# Patient Record
Sex: FEMALE | Race: WHITE
[De-identification: names, ages, dates, MRNs, and addresses within clinical notes are randomized per-mention and may not be internally consistent; named-entity substitution may affect disease eponyms.]

---

## 2020-05-25 NOTE — NUR
PT UP OUT OF ROOM HOSTILE NO LONGER WANTING TO BE SEEN STATES THAT THE SHE IS 
FINE NOW THAT SHE HAS HAD SOME WATER. ENCOURAGED PT TO RETURN TO ROOM PT WALKIN 
GOUT OF FAST TRACK TO LOBBY . PT AMBULATEING WITH STEADY GAIT REFUSING TO BE 
SEEN REFUSING TO COME BACK TO ROOM REFUSING TO SIGN PAPER WORK . PT WALKED OUT 
OF HOSPITAL

## 2020-06-25 ENCOUNTER — HOSPITAL ENCOUNTER (EMERGENCY)
Dept: HOSPITAL 94 - ER | Age: 35
Discharge: LEFT BEFORE DISCHARGE | End: 2020-06-25
Payer: MEDICAID

## 2020-06-25 VITALS — BODY MASS INDEX: 23.53 KG/M2 | HEIGHT: 62 IN | WEIGHT: 127.87 LBS

## 2020-06-25 VITALS — SYSTOLIC BLOOD PRESSURE: 109 MMHG | DIASTOLIC BLOOD PRESSURE: 64 MMHG

## 2020-06-25 DIAGNOSIS — Z53.21: ICD-10-CM

## 2020-06-25 DIAGNOSIS — R07.9: Primary | ICD-10-CM

## 2020-06-25 PROCEDURE — 93005 ELECTROCARDIOGRAM TRACING: CPT

## 2020-06-25 NOTE — NUR
Pt walked out of the department without saying anything. Noted to be walking 
towards the exit and asked where she was going, if she is leaving. She stated 
"yeah, I am ok" and kept walking towards.

## 2021-09-04 ENCOUNTER — APPOINTMENT (OUTPATIENT)
Dept: RADIOLOGY | Facility: MEDICAL CENTER | Age: 36
DRG: 957 | End: 2021-09-04
Attending: SURGERY
Payer: MEDICAID

## 2021-09-04 ENCOUNTER — ANESTHESIA (OUTPATIENT)
Dept: SURGERY | Facility: MEDICAL CENTER | Age: 36
DRG: 957 | End: 2021-09-04
Payer: MEDICAID

## 2021-09-04 ENCOUNTER — APPOINTMENT (OUTPATIENT)
Dept: RADIOLOGY | Facility: MEDICAL CENTER | Age: 36
DRG: 957 | End: 2021-09-04
Attending: EMERGENCY MEDICINE
Payer: MEDICAID

## 2021-09-04 ENCOUNTER — APPOINTMENT (OUTPATIENT)
Dept: RADIOLOGY | Facility: MEDICAL CENTER | Age: 36
DRG: 957 | End: 2021-09-04
Attending: SPECIALIST
Payer: MEDICAID

## 2021-09-04 ENCOUNTER — ANESTHESIA EVENT (OUTPATIENT)
Dept: SURGERY | Facility: MEDICAL CENTER | Age: 36
DRG: 957 | End: 2021-09-04
Payer: MEDICAID

## 2021-09-04 ENCOUNTER — HOSPITAL ENCOUNTER (INPATIENT)
Facility: MEDICAL CENTER | Age: 36
LOS: 30 days | DRG: 957 | End: 2021-10-04
Attending: EMERGENCY MEDICINE | Admitting: SURGERY
Payer: MEDICAID

## 2021-09-04 DIAGNOSIS — S12.100A CLOSED DISPLACED FRACTURE OF SECOND CERVICAL VERTEBRA, UNSPECIFIED FRACTURE MORPHOLOGY, INITIAL ENCOUNTER (HCC): ICD-10-CM

## 2021-09-04 DIAGNOSIS — F99 PSYCHIATRIC DISORDER: ICD-10-CM

## 2021-09-04 DIAGNOSIS — J90 PLEURAL EFFUSION ON RIGHT: ICD-10-CM

## 2021-09-04 DIAGNOSIS — J95.821 ACUTE RESPIRATORY FAILURE FOLLOWING TRAUMA AND SURGERY (HCC): ICD-10-CM

## 2021-09-04 DIAGNOSIS — S22.41XA CLOSED FRACTURE OF MULTIPLE RIBS OF RIGHT SIDE, INITIAL ENCOUNTER: ICD-10-CM

## 2021-09-04 DIAGNOSIS — R00.0 TACHYCARDIA: ICD-10-CM

## 2021-09-04 PROBLEM — S27.809A DIAPHRAGM, RUPTURE: Status: ACTIVE | Noted: 2021-09-04

## 2021-09-04 PROBLEM — T19.2XXA FOREIGN BODY IN VAGINA: Status: ACTIVE | Noted: 2021-09-04

## 2021-09-04 PROBLEM — E87.20 LACTIC ACID ACIDOSIS: Status: ACTIVE | Noted: 2021-09-04

## 2021-09-04 PROBLEM — E83.51 HYPOCALCEMIA: Status: ACTIVE | Noted: 2021-09-04

## 2021-09-04 PROBLEM — Z53.09 CONTRAINDICATION TO DEEP VEIN THROMBOSIS (DVT) PROPHYLAXIS: Status: ACTIVE | Noted: 2021-09-04

## 2021-09-04 PROBLEM — S36.113A LIVER LACERATION: Status: ACTIVE | Noted: 2021-09-04

## 2021-09-04 PROBLEM — S27.322A CONTUSION OF BOTH LUNGS: Status: ACTIVE | Noted: 2021-09-04

## 2021-09-04 PROBLEM — T68.XXXA HYPOTHERMIA: Status: ACTIVE | Noted: 2021-09-04

## 2021-09-04 PROBLEM — R57.8 HEMORRHAGIC SHOCK (HCC): Status: ACTIVE | Noted: 2021-09-04

## 2021-09-04 PROBLEM — Z11.52 ENCOUNTER FOR SCREENING FOR COVID-19: Status: ACTIVE | Noted: 2021-09-04

## 2021-09-04 PROBLEM — J94.2 HEMOTHORAX: Status: ACTIVE | Noted: 2021-09-04

## 2021-09-04 PROBLEM — Z78.9 ALCOHOL USE: Status: ACTIVE | Noted: 2021-09-04

## 2021-09-04 PROBLEM — T14.90XA TRAUMA: Status: ACTIVE | Noted: 2021-09-04

## 2021-09-04 PROBLEM — R79.89 ELEVATED LFTS: Status: ACTIVE | Noted: 2021-09-04

## 2021-09-04 LAB
ABO + RH BLD: NORMAL
ABO GROUP BLD: NORMAL
ALBUMIN SERPL BCP-MCNC: 1.9 G/DL (ref 3.2–4.9)
ALBUMIN SERPL BCP-MCNC: 2.3 G/DL (ref 3.2–4.9)
ALBUMIN SERPL BCP-MCNC: 2.5 G/DL (ref 3.2–4.9)
ALBUMIN SERPL BCP-MCNC: 3.6 G/DL (ref 3.2–4.9)
ALBUMIN/GLOB SERPL: 1.1 G/DL
ALBUMIN/GLOB SERPL: 1.2 G/DL
ALBUMIN/GLOB SERPL: 1.3 G/DL
ALBUMIN/GLOB SERPL: 1.3 G/DL
ALP SERPL-CCNC: 114 U/L (ref 30–99)
ALP SERPL-CCNC: 67 U/L (ref 30–99)
ALP SERPL-CCNC: 88 U/L (ref 30–99)
ALP SERPL-CCNC: 92 U/L (ref 30–99)
ALT SERPL-CCNC: 225 U/L (ref 2–50)
ALT SERPL-CCNC: 296 U/L (ref 2–50)
ALT SERPL-CCNC: 318 U/L (ref 2–50)
ALT SERPL-CCNC: 98 U/L (ref 2–50)
ANION GAP SERPL CALC-SCNC: 10 MMOL/L (ref 7–16)
ANION GAP SERPL CALC-SCNC: 11 MMOL/L (ref 7–16)
ANION GAP SERPL CALC-SCNC: 21 MMOL/L (ref 7–16)
ANION GAP SERPL CALC-SCNC: 7 MMOL/L (ref 7–16)
APTT PPP: 29.2 SEC (ref 24.7–36)
APTT PPP: 33 SEC (ref 24.7–36)
AST SERPL-CCNC: 200 U/L (ref 12–45)
AST SERPL-CCNC: 510 U/L (ref 12–45)
AST SERPL-CCNC: 587 U/L (ref 12–45)
AST SERPL-CCNC: 712 U/L (ref 12–45)
BARCODED ABORH UBTYP: 5100
BARCODED ABORH UBTYP: 6200
BARCODED ABORH UBTYP: 8400
BARCODED ABORH UBTYP: 9500
BARCODED PRD CODE UBPRD: NORMAL
BARCODED UNIT NUM UBUNT: NORMAL
BASOPHILS # BLD AUTO: 0.1 % (ref 0–1.8)
BASOPHILS # BLD AUTO: 0.1 % (ref 0–1.8)
BASOPHILS # BLD AUTO: 0.5 % (ref 0–1.8)
BASOPHILS # BLD: 0.02 K/UL (ref 0–0.12)
BASOPHILS # BLD: 0.02 K/UL (ref 0–0.12)
BASOPHILS # BLD: 0.07 K/UL (ref 0–0.12)
BILIRUB SERPL-MCNC: 0.5 MG/DL (ref 0.1–1.5)
BILIRUB SERPL-MCNC: 0.5 MG/DL (ref 0.1–1.5)
BILIRUB SERPL-MCNC: 0.6 MG/DL (ref 0.1–1.5)
BILIRUB SERPL-MCNC: <0.2 MG/DL (ref 0.1–1.5)
BLD GP AB SCN SERPL QL: NORMAL
BUN SERPL-MCNC: 12 MG/DL (ref 8–22)
BUN SERPL-MCNC: 13 MG/DL (ref 8–22)
BUN SERPL-MCNC: 15 MG/DL (ref 8–22)
BUN SERPL-MCNC: 16 MG/DL (ref 8–22)
CA-I SERPL-SCNC: 0.9 MMOL/L (ref 1.1–1.3)
CALCIUM SERPL-MCNC: 5.2 MG/DL (ref 8.5–10.5)
CALCIUM SERPL-MCNC: 6.5 MG/DL (ref 8.5–10.5)
CALCIUM SERPL-MCNC: 7.5 MG/DL (ref 8.5–10.5)
CALCIUM SERPL-MCNC: 9 MG/DL (ref 8.5–10.5)
CFT BLD TEG: 7.1 MIN (ref 4.6–9.1)
CFT BLD TEG: 7.1 MIN (ref 4.6–9.1)
CFT P HPASE BLD TEG: 5.8 MIN (ref 4.3–8.3)
CFT P HPASE BLD TEG: 6.2 MIN (ref 4.3–8.3)
CHLORIDE SERPL-SCNC: 108 MMOL/L (ref 96–112)
CHLORIDE SERPL-SCNC: 111 MMOL/L (ref 96–112)
CHLORIDE SERPL-SCNC: 113 MMOL/L (ref 96–112)
CHLORIDE SERPL-SCNC: 113 MMOL/L (ref 96–112)
CLOT ANGLE BLD TEG: 63 DEGREES (ref 63–78)
CLOT ANGLE BLD TEG: 64.8 DEGREES (ref 63–78)
CLOT LYSIS 30M P MA LENFR BLD TEG: 0 % (ref 0–2.6)
CLOT LYSIS 30M P MA LENFR BLD TEG: 0 % (ref 0–2.6)
CO2 SERPL-SCNC: 15 MMOL/L (ref 20–33)
CO2 SERPL-SCNC: 22 MMOL/L (ref 20–33)
CO2 SERPL-SCNC: 26 MMOL/L (ref 20–33)
CO2 SERPL-SCNC: 26 MMOL/L (ref 20–33)
COMPONENT F 8504F: NORMAL
COMPONENT P 8504P: NORMAL
COMPONENT P 8504P: NORMAL
COMPONENT R 8504R: NORMAL
CORTIS SERPL-MCNC: 30.3 UG/DL (ref 0–23)
COVID ORDER STATUS COVID19: NORMAL
CREAT SERPL-MCNC: 0.6 MG/DL (ref 0.5–1.4)
CREAT SERPL-MCNC: 0.64 MG/DL (ref 0.5–1.4)
CREAT SERPL-MCNC: 0.74 MG/DL (ref 0.5–1.4)
CREAT SERPL-MCNC: 0.86 MG/DL (ref 0.5–1.4)
CT.EXTRINSIC BLD ROTEM: 2.3 MIN (ref 0.8–2.1)
CT.EXTRINSIC BLD ROTEM: 2.3 MIN (ref 0.8–2.1)
EOSINOPHIL # BLD AUTO: 0 K/UL (ref 0–0.51)
EOSINOPHIL # BLD AUTO: 0.01 K/UL (ref 0–0.51)
EOSINOPHIL # BLD AUTO: 0.11 K/UL (ref 0–0.51)
EOSINOPHIL NFR BLD: 0 % (ref 0–6.9)
EOSINOPHIL NFR BLD: 0.1 % (ref 0–6.9)
EOSINOPHIL NFR BLD: 0.7 % (ref 0–6.9)
ERYTHROCYTE [DISTWIDTH] IN BLOOD BY AUTOMATED COUNT: 54.7 FL (ref 35.9–50)
ERYTHROCYTE [DISTWIDTH] IN BLOOD BY AUTOMATED COUNT: 55.2 FL (ref 35.9–50)
ERYTHROCYTE [DISTWIDTH] IN BLOOD BY AUTOMATED COUNT: 56.5 FL (ref 35.9–50)
ERYTHROCYTE [DISTWIDTH] IN BLOOD BY AUTOMATED COUNT: 58.8 FL (ref 35.9–50)
ETHANOL BLD-MCNC: 84 MG/DL (ref 0–10)
FIBRINOGEN PPP-MCNC: 131 MG/DL (ref 215–460)
FLUAV RNA SPEC QL NAA+PROBE: NEGATIVE
FLUBV RNA SPEC QL NAA+PROBE: NEGATIVE
GLOBULIN SER CALC-MCNC: 1.6 G/DL (ref 1.9–3.5)
GLOBULIN SER CALC-MCNC: 2 G/DL (ref 1.9–3.5)
GLOBULIN SER CALC-MCNC: 2.1 G/DL (ref 1.9–3.5)
GLOBULIN SER CALC-MCNC: 2.7 G/DL (ref 1.9–3.5)
GLUCOSE BLD-MCNC: 109 MG/DL (ref 65–99)
GLUCOSE BLD-MCNC: 121 MG/DL (ref 65–99)
GLUCOSE BLD-MCNC: 126 MG/DL (ref 65–99)
GLUCOSE BLD-MCNC: 170 MG/DL (ref 65–99)
GLUCOSE BLD-MCNC: 68 MG/DL (ref 65–99)
GLUCOSE SERPL-MCNC: 123 MG/DL (ref 65–99)
GLUCOSE SERPL-MCNC: 124 MG/DL (ref 65–99)
GLUCOSE SERPL-MCNC: 242 MG/DL (ref 65–99)
GLUCOSE SERPL-MCNC: 72 MG/DL (ref 65–99)
HCG SERPL QL: NEGATIVE
HCT VFR BLD AUTO: 38.8 % (ref 37–47)
HCT VFR BLD AUTO: 39.4 % (ref 37–47)
HCT VFR BLD AUTO: 42.5 % (ref 37–47)
HCT VFR BLD AUTO: 44.4 % (ref 37–47)
HGB BLD-MCNC: 12.1 G/DL (ref 12–16)
HGB BLD-MCNC: 13.2 G/DL (ref 12–16)
HGB BLD-MCNC: 14.9 G/DL (ref 12–16)
HGB BLD-MCNC: 15.3 G/DL (ref 12–16)
IMM GRANULOCYTES # BLD AUTO: 0.1 K/UL (ref 0–0.11)
IMM GRANULOCYTES # BLD AUTO: 0.11 K/UL (ref 0–0.11)
IMM GRANULOCYTES # BLD AUTO: 0.13 K/UL (ref 0–0.11)
IMM GRANULOCYTES NFR BLD AUTO: 0.6 % (ref 0–0.9)
IMM GRANULOCYTES NFR BLD AUTO: 0.7 % (ref 0–0.9)
IMM GRANULOCYTES NFR BLD AUTO: 0.9 % (ref 0–0.9)
INR PPP: 1.1 (ref 0.87–1.13)
INR PPP: 1.54 (ref 0.87–1.13)
LACTATE BLD-SCNC: 2.9 MMOL/L (ref 0.5–2)
LACTATE BLD-SCNC: 8.7 MMOL/L (ref 0.5–2)
LYMPHOCYTES # BLD AUTO: 0.67 K/UL (ref 1–4.8)
LYMPHOCYTES # BLD AUTO: 0.95 K/UL (ref 1–4.8)
LYMPHOCYTES # BLD AUTO: 1.14 K/UL (ref 1–4.8)
LYMPHOCYTES NFR BLD: 3.9 % (ref 22–41)
LYMPHOCYTES NFR BLD: 6 % (ref 22–41)
LYMPHOCYTES NFR BLD: 7.6 % (ref 22–41)
MAGNESIUM SERPL-MCNC: 2 MG/DL (ref 1.5–2.5)
MCF BLD TEG: 47.2 MM (ref 52–69)
MCF BLD TEG: 50.9 MM (ref 52–69)
MCF.PLATELET INHIB BLD ROTEM: 15.1 MM (ref 15–32)
MCF.PLATELET INHIB BLD ROTEM: 15.9 MM (ref 15–32)
MCH RBC QN AUTO: 30.8 PG (ref 27–33)
MCH RBC QN AUTO: 30.8 PG (ref 27–33)
MCH RBC QN AUTO: 31.4 PG (ref 27–33)
MCH RBC QN AUTO: 33.9 PG (ref 27–33)
MCHC RBC AUTO-ENTMCNC: 30.7 G/DL (ref 33.6–35)
MCHC RBC AUTO-ENTMCNC: 34 G/DL (ref 33.6–35)
MCHC RBC AUTO-ENTMCNC: 34.5 G/DL (ref 33.6–35)
MCHC RBC AUTO-ENTMCNC: 35.1 G/DL (ref 33.6–35)
MCV RBC AUTO: 110.4 FL (ref 81.4–97.8)
MCV RBC AUTO: 89.5 FL (ref 81.4–97.8)
MCV RBC AUTO: 89.5 FL (ref 81.4–97.8)
MCV RBC AUTO: 90.7 FL (ref 81.4–97.8)
MONOCYTES # BLD AUTO: 1.45 K/UL (ref 0–0.85)
MONOCYTES # BLD AUTO: 1.54 K/UL (ref 0–0.85)
MONOCYTES # BLD AUTO: 1.56 K/UL (ref 0–0.85)
MONOCYTES NFR BLD AUTO: 10.4 % (ref 0–13.4)
MONOCYTES NFR BLD AUTO: 8.4 % (ref 0–13.4)
MONOCYTES NFR BLD AUTO: 9.8 % (ref 0–13.4)
NEUTROPHILS # BLD AUTO: 11.94 K/UL (ref 2–7.15)
NEUTROPHILS # BLD AUTO: 13.17 K/UL (ref 2–7.15)
NEUTROPHILS # BLD AUTO: 14.91 K/UL (ref 2–7.15)
NEUTROPHILS NFR BLD: 79.9 % (ref 44–72)
NEUTROPHILS NFR BLD: 83.4 % (ref 44–72)
NEUTROPHILS NFR BLD: 86.9 % (ref 44–72)
NRBC # BLD AUTO: 0 K/UL
NRBC BLD-RTO: 0 /100 WBC
PA AA BLD-ACNC: 14.8 % (ref 0–11)
PA AA BLD-ACNC: 32.5 % (ref 0–11)
PA ADP BLD-ACNC: 3.1 % (ref 0–17)
PA ADP BLD-ACNC: 68.4 % (ref 0–17)
PHOSPHATE SERPL-MCNC: 3.2 MG/DL (ref 2.5–4.5)
PLATELET # BLD AUTO: 126 K/UL (ref 164–446)
PLATELET # BLD AUTO: 143 K/UL (ref 164–446)
PLATELET # BLD AUTO: 147 K/UL (ref 164–446)
PLATELET # BLD AUTO: 368 K/UL (ref 164–446)
PMV BLD AUTO: 10.1 FL (ref 9–12.9)
PMV BLD AUTO: 10.1 FL (ref 9–12.9)
PMV BLD AUTO: 10.6 FL (ref 9–12.9)
PMV BLD AUTO: 9.9 FL (ref 9–12.9)
POTASSIUM SERPL-SCNC: 3.8 MMOL/L (ref 3.6–5.5)
POTASSIUM SERPL-SCNC: 4.7 MMOL/L (ref 3.6–5.5)
POTASSIUM SERPL-SCNC: 4.8 MMOL/L (ref 3.6–5.5)
POTASSIUM SERPL-SCNC: 4.9 MMOL/L (ref 3.6–5.5)
PRODUCT TYPE UPROD: NORMAL
PROT SERPL-MCNC: 3.5 G/DL (ref 6–8.2)
PROT SERPL-MCNC: 4.4 G/DL (ref 6–8.2)
PROT SERPL-MCNC: 4.5 G/DL (ref 6–8.2)
PROT SERPL-MCNC: 6.3 G/DL (ref 6–8.2)
PROTHROMBIN TIME: 13.9 SEC (ref 12–14.6)
PROTHROMBIN TIME: 18 SEC (ref 12–14.6)
RBC # BLD AUTO: 3.57 M/UL (ref 4.2–5.4)
RBC # BLD AUTO: 4.28 M/UL (ref 4.2–5.4)
RBC # BLD AUTO: 4.75 M/UL (ref 4.2–5.4)
RBC # BLD AUTO: 4.96 M/UL (ref 4.2–5.4)
RH BLD: NORMAL
RSV RNA SPEC QL NAA+PROBE: NEGATIVE
SARS-COV-2 RNA RESP QL NAA+PROBE: NOTDETECTED
SODIUM SERPL-SCNC: 144 MMOL/L (ref 135–145)
SODIUM SERPL-SCNC: 145 MMOL/L (ref 135–145)
SODIUM SERPL-SCNC: 146 MMOL/L (ref 135–145)
SODIUM SERPL-SCNC: 148 MMOL/L (ref 135–145)
SPECIMEN SOURCE: NORMAL
T4 FREE SERPL-MCNC: 1.58 NG/DL (ref 0.93–1.7)
TEG ALGORITHM TGALG: ABNORMAL
TEG ALGORITHM TGALG: ABNORMAL
TROPONIN T SERPL-MCNC: 72 NG/L (ref 6–19)
TSH SERPL DL<=0.005 MIU/L-ACNC: 1.55 UIU/ML (ref 0.38–5.33)
UNIT STATUS USTAT: NORMAL
WBC # BLD AUTO: 15 K/UL (ref 4.8–10.8)
WBC # BLD AUTO: 15.2 K/UL (ref 4.8–10.8)
WBC # BLD AUTO: 15.8 K/UL (ref 4.8–10.8)
WBC # BLD AUTO: 17.2 K/UL (ref 4.8–10.8)

## 2021-09-04 PROCEDURE — P9017 PLASMA 1 DONOR FRZ W/IN 8 HR: HCPCS | Mod: 91

## 2021-09-04 PROCEDURE — 96374 THER/PROPH/DIAG INJ IV PUSH: CPT

## 2021-09-04 PROCEDURE — 0W3G0ZZ CONTROL BLEEDING IN PERITONEAL CAVITY, OPEN APPROACH: ICD-10-PCS | Performed by: SURGERY

## 2021-09-04 PROCEDURE — 82533 TOTAL CORTISOL: CPT

## 2021-09-04 PROCEDURE — P9034 PLATELETS, PHERESIS: HCPCS

## 2021-09-04 PROCEDURE — 72128 CT CHEST SPINE W/O DYE: CPT

## 2021-09-04 PROCEDURE — 31500 INSERT EMERGENCY AIRWAY: CPT

## 2021-09-04 PROCEDURE — 36430 TRANSFUSION BLD/BLD COMPNT: CPT

## 2021-09-04 PROCEDURE — 99292 CRITICAL CARE ADDL 30 MIN: CPT | Mod: 25 | Performed by: SURGERY

## 2021-09-04 PROCEDURE — 94002 VENT MGMT INPAT INIT DAY: CPT

## 2021-09-04 PROCEDURE — G0390 TRAUMA RESPONS W/HOSP CRITI: HCPCS

## 2021-09-04 PROCEDURE — 74018 RADEX ABDOMEN 1 VIEW: CPT

## 2021-09-04 PROCEDURE — 84132 ASSAY OF SERUM POTASSIUM: CPT

## 2021-09-04 PROCEDURE — 303105 HCHG CATHETER EXTRA

## 2021-09-04 PROCEDURE — 72125 CT NECK SPINE W/O DYE: CPT

## 2021-09-04 PROCEDURE — 84703 CHORIONIC GONADOTROPIN ASSAY: CPT

## 2021-09-04 PROCEDURE — 99291 CRITICAL CARE FIRST HOUR: CPT | Mod: 25 | Performed by: SURGERY

## 2021-09-04 PROCEDURE — 32551 INSERTION OF CHEST TUBE: CPT | Mod: 59 | Performed by: SURGERY

## 2021-09-04 PROCEDURE — 0UCG7ZZ EXTIRPATION OF MATTER FROM VAGINA, VIA NATURAL OR ARTIFICIAL OPENING: ICD-10-PCS | Performed by: SURGERY

## 2021-09-04 PROCEDURE — 71260 CT THORAX DX C+: CPT

## 2021-09-04 PROCEDURE — 82803 BLOOD GASES ANY COMBINATION: CPT | Mod: 91

## 2021-09-04 PROCEDURE — 71045 X-RAY EXAM CHEST 1 VIEW: CPT

## 2021-09-04 PROCEDURE — 160042 HCHG SURGERY MINUTES - EA ADDL 1 MIN LEVEL 5: Performed by: SURGERY

## 2021-09-04 PROCEDURE — 160031 HCHG SURGERY MINUTES - 1ST 30 MINS LEVEL 5: Performed by: SURGERY

## 2021-09-04 PROCEDURE — 160009 HCHG ANES TIME/MIN: Performed by: SURGERY

## 2021-09-04 PROCEDURE — 700111 HCHG RX REV CODE 636 W/ 250 OVERRIDE (IP): Performed by: ANESTHESIOLOGY

## 2021-09-04 PROCEDURE — 85576 BLOOD PLATELET AGGREGATION: CPT | Mod: 91

## 2021-09-04 PROCEDURE — 92950 HEART/LUNG RESUSCITATION CPR: CPT

## 2021-09-04 PROCEDURE — 72131 CT LUMBAR SPINE W/O DYE: CPT

## 2021-09-04 PROCEDURE — 700111 HCHG RX REV CODE 636 W/ 250 OVERRIDE (IP): Performed by: SPECIALIST

## 2021-09-04 PROCEDURE — 84295 ASSAY OF SERUM SODIUM: CPT | Mod: 91

## 2021-09-04 PROCEDURE — 70450 CT HEAD/BRAIN W/O DYE: CPT

## 2021-09-04 PROCEDURE — 83735 ASSAY OF MAGNESIUM: CPT

## 2021-09-04 PROCEDURE — 700111 HCHG RX REV CODE 636 W/ 250 OVERRIDE (IP): Performed by: EMERGENCY MEDICINE

## 2021-09-04 PROCEDURE — 86923 COMPATIBILITY TEST ELECTRIC: CPT | Mod: 91

## 2021-09-04 PROCEDURE — 700105 HCHG RX REV CODE 258: Performed by: SURGERY

## 2021-09-04 PROCEDURE — 0241U HCHG SARS-COV-2 COVID-19 NFCT DS RESP RNA 4 TRGT MIC: CPT

## 2021-09-04 PROCEDURE — 76705 ECHO EXAM OF ABDOMEN: CPT

## 2021-09-04 PROCEDURE — 85610 PROTHROMBIN TIME: CPT | Mod: 91

## 2021-09-04 PROCEDURE — 700111 HCHG RX REV CODE 636 W/ 250 OVERRIDE (IP)

## 2021-09-04 PROCEDURE — P9016 RBC LEUKOCYTES REDUCED: HCPCS | Mod: 91

## 2021-09-04 PROCEDURE — 85014 HEMATOCRIT: CPT

## 2021-09-04 PROCEDURE — 70498 CT ANGIOGRAPHY NECK: CPT

## 2021-09-04 PROCEDURE — 700111 HCHG RX REV CODE 636 W/ 250 OVERRIDE (IP): Performed by: SURGERY

## 2021-09-04 PROCEDURE — 85025 COMPLETE CBC W/AUTO DIFF WBC: CPT | Mod: 91

## 2021-09-04 PROCEDURE — 88300 SURGICAL PATH GROSS: CPT

## 2021-09-04 PROCEDURE — 44125 REMOVAL OF SMALL INTESTINE: CPT | Performed by: SURGERY

## 2021-09-04 PROCEDURE — 82330 ASSAY OF CALCIUM: CPT

## 2021-09-04 PROCEDURE — 501452 HCHG STAPLES, GIA MULTIFIRE 60/80: Performed by: SURGERY

## 2021-09-04 PROCEDURE — 700105 HCHG RX REV CODE 258: Performed by: ANESTHESIOLOGY

## 2021-09-04 PROCEDURE — 82077 ASSAY SPEC XCP UR&BREATH IA: CPT

## 2021-09-04 PROCEDURE — 94003 VENT MGMT INPAT SUBQ DAY: CPT

## 2021-09-04 PROCEDURE — 84100 ASSAY OF PHOSPHORUS: CPT

## 2021-09-04 PROCEDURE — 72170 X-RAY EXAM OF PELVIS: CPT

## 2021-09-04 PROCEDURE — 85347 COAGULATION TIME ACTIVATED: CPT

## 2021-09-04 PROCEDURE — 770022 HCHG ROOM/CARE - ICU (200)

## 2021-09-04 PROCEDURE — 96375 TX/PRO/DX INJ NEW DRUG ADDON: CPT

## 2021-09-04 PROCEDURE — 700101 HCHG RX REV CODE 250: Performed by: EMERGENCY MEDICINE

## 2021-09-04 PROCEDURE — 700105 HCHG RX REV CODE 258: Performed by: NURSE PRACTITIONER

## 2021-09-04 PROCEDURE — 83605 ASSAY OF LACTIC ACID: CPT

## 2021-09-04 PROCEDURE — 700105 HCHG RX REV CODE 258: Performed by: SPECIALIST

## 2021-09-04 PROCEDURE — 85730 THROMBOPLASTIN TIME PARTIAL: CPT

## 2021-09-04 PROCEDURE — 51702 INSERT TEMP BLADDER CATH: CPT

## 2021-09-04 PROCEDURE — 39540 REPAIR OF DIAPHRAGM HERNIA: CPT | Performed by: SURGERY

## 2021-09-04 PROCEDURE — 160048 HCHG OR STATISTICAL LEVEL 1-5: Performed by: SURGERY

## 2021-09-04 PROCEDURE — 85384 FIBRINOGEN ACTIVITY: CPT

## 2021-09-04 PROCEDURE — 47361 REPAIR LIVER WOUND: CPT | Performed by: SURGERY

## 2021-09-04 PROCEDURE — 84443 ASSAY THYROID STIM HORMONE: CPT

## 2021-09-04 PROCEDURE — 84484 ASSAY OF TROPONIN QUANT: CPT

## 2021-09-04 PROCEDURE — 99291 CRITICAL CARE FIRST HOUR: CPT

## 2021-09-04 PROCEDURE — 0DQV0ZZ REPAIR MESENTERY, OPEN APPROACH: ICD-10-PCS | Performed by: SURGERY

## 2021-09-04 PROCEDURE — 501433 HCHG STAPLER, GIA MULTIFIRE 60/80: Performed by: SURGERY

## 2021-09-04 PROCEDURE — 700101 HCHG RX REV CODE 250: Performed by: ANESTHESIOLOGY

## 2021-09-04 PROCEDURE — 0DBL0ZZ EXCISION OF TRANSVERSE COLON, OPEN APPROACH: ICD-10-PCS | Performed by: SURGERY

## 2021-09-04 PROCEDURE — 80053 COMPREHEN METABOLIC PANEL: CPT | Mod: 91

## 2021-09-04 PROCEDURE — 86900 BLOOD TYPING SEROLOGIC ABO: CPT

## 2021-09-04 PROCEDURE — 700117 HCHG RX CONTRAST REV CODE 255: Performed by: SURGERY

## 2021-09-04 PROCEDURE — 94799 UNLISTED PULMONARY SVC/PX: CPT

## 2021-09-04 PROCEDURE — 86901 BLOOD TYPING SEROLOGIC RH(D): CPT

## 2021-09-04 PROCEDURE — 84439 ASSAY OF FREE THYROXINE: CPT

## 2021-09-04 PROCEDURE — 32551 INSERTION OF CHEST TUBE: CPT

## 2021-09-04 PROCEDURE — 700105 HCHG RX REV CODE 258: Performed by: EMERGENCY MEDICINE

## 2021-09-04 PROCEDURE — 30233N1 TRANSFUSION OF NONAUTOLOGOUS RED BLOOD CELLS INTO PERIPHERAL VEIN, PERCUTANEOUS APPROACH: ICD-10-PCS | Performed by: SURGERY

## 2021-09-04 PROCEDURE — 502704 HCHG DEVICE, LIGASURE IMPACT: Performed by: SURGERY

## 2021-09-04 PROCEDURE — 82962 GLUCOSE BLOOD TEST: CPT

## 2021-09-04 PROCEDURE — 0W9930Z DRAINAGE OF RIGHT PLEURAL CAVITY WITH DRAINAGE DEVICE, PERCUTANEOUS APPROACH: ICD-10-PCS | Performed by: SURGERY

## 2021-09-04 PROCEDURE — 700101 HCHG RX REV CODE 250: Performed by: SPECIALIST

## 2021-09-04 PROCEDURE — 86850 RBC ANTIBODY SCREEN: CPT

## 2021-09-04 PROCEDURE — 501838 HCHG SUTURE GENERAL: Performed by: SURGERY

## 2021-09-04 PROCEDURE — 5A1955Z RESPIRATORY VENTILATION, GREATER THAN 96 CONSECUTIVE HOURS: ICD-10-PCS | Performed by: SURGERY

## 2021-09-04 PROCEDURE — 88307 TISSUE EXAM BY PATHOLOGIST: CPT

## 2021-09-04 PROCEDURE — 85027 COMPLETE CBC AUTOMATED: CPT

## 2021-09-04 RX ORDER — POLYETHYLENE GLYCOL 3350 17 G/17G
1 POWDER, FOR SOLUTION ORAL 2 TIMES DAILY PRN
Status: DISCONTINUED | OUTPATIENT
Start: 2021-09-04 | End: 2021-09-07

## 2021-09-04 RX ORDER — ENEMA 19; 7 G/133ML; G/133ML
1 ENEMA RECTAL
Status: DISCONTINUED | OUTPATIENT
Start: 2021-09-04 | End: 2021-10-02

## 2021-09-04 RX ORDER — OXYCODONE HYDROCHLORIDE 5 MG/1
5 TABLET ORAL
Status: DISCONTINUED | OUTPATIENT
Start: 2021-09-04 | End: 2021-09-04

## 2021-09-04 RX ORDER — FAMOTIDINE 20 MG/1
20 TABLET, FILM COATED ORAL 2 TIMES DAILY
Status: DISCONTINUED | OUTPATIENT
Start: 2021-09-04 | End: 2021-09-04

## 2021-09-04 RX ORDER — ROCURONIUM BROMIDE 10 MG/ML
INJECTION, SOLUTION INTRAVENOUS
Status: COMPLETED | OUTPATIENT
Start: 2021-09-04 | End: 2021-09-04

## 2021-09-04 RX ORDER — AMOXICILLIN 250 MG
1 CAPSULE ORAL NIGHTLY PRN
Status: DISCONTINUED | OUTPATIENT
Start: 2021-09-04 | End: 2021-09-04

## 2021-09-04 RX ORDER — KETAMINE HYDROCHLORIDE 50 MG/ML
INJECTION, SOLUTION INTRAMUSCULAR; INTRAVENOUS
Status: COMPLETED | OUTPATIENT
Start: 2021-09-04 | End: 2021-09-04

## 2021-09-04 RX ORDER — OXYCODONE HYDROCHLORIDE 10 MG/1
10 TABLET ORAL
Status: DISCONTINUED | OUTPATIENT
Start: 2021-09-04 | End: 2021-09-04

## 2021-09-04 RX ORDER — CEFOTETAN DISODIUM 2 G/20ML
INJECTION, POWDER, FOR SOLUTION INTRAMUSCULAR; INTRAVENOUS PRN
Status: DISCONTINUED | OUTPATIENT
Start: 2021-09-04 | End: 2021-09-04 | Stop reason: SURG

## 2021-09-04 RX ORDER — ONDANSETRON 4 MG/1
4 TABLET, ORALLY DISINTEGRATING ORAL EVERY 4 HOURS PRN
Status: DISCONTINUED | OUTPATIENT
Start: 2021-09-04 | End: 2021-09-07

## 2021-09-04 RX ORDER — NOREPINEPHRINE BITARTRATE 0.03 MG/ML
0-30 INJECTION, SOLUTION INTRAVENOUS CONTINUOUS
Status: DISCONTINUED | OUTPATIENT
Start: 2021-09-04 | End: 2021-09-07

## 2021-09-04 RX ORDER — BISACODYL 10 MG
10 SUPPOSITORY, RECTAL RECTAL
Status: DISCONTINUED | OUTPATIENT
Start: 2021-09-04 | End: 2021-09-29

## 2021-09-04 RX ORDER — MIDAZOLAM HYDROCHLORIDE 1 MG/ML
1-5 INJECTION INTRAMUSCULAR; INTRAVENOUS
Status: DISCONTINUED | OUTPATIENT
Start: 2021-09-04 | End: 2021-09-11

## 2021-09-04 RX ORDER — POLYETHYLENE GLYCOL 3350 17 G/17G
1 POWDER, FOR SOLUTION ORAL 2 TIMES DAILY
Status: DISCONTINUED | OUTPATIENT
Start: 2021-09-04 | End: 2021-09-04

## 2021-09-04 RX ORDER — DEXTROSE MONOHYDRATE 25 G/50ML
50 INJECTION, SOLUTION INTRAVENOUS
Status: DISCONTINUED | OUTPATIENT
Start: 2021-09-04 | End: 2021-09-11

## 2021-09-04 RX ORDER — AMOXICILLIN 250 MG
1 CAPSULE ORAL
Status: DISCONTINUED | OUTPATIENT
Start: 2021-09-04 | End: 2021-09-07

## 2021-09-04 RX ORDER — DOCUSATE SODIUM 100 MG/1
100 CAPSULE, LIQUID FILLED ORAL 2 TIMES DAILY
Status: DISCONTINUED | OUTPATIENT
Start: 2021-09-04 | End: 2021-09-07

## 2021-09-04 RX ORDER — ACETAMINOPHEN 325 MG/1
650 TABLET ORAL EVERY 4 HOURS PRN
Status: DISCONTINUED | OUTPATIENT
Start: 2021-09-04 | End: 2021-09-07

## 2021-09-04 RX ORDER — SODIUM CHLORIDE 9 MG/ML
INJECTION, SOLUTION INTRAVENOUS
Status: COMPLETED | OUTPATIENT
Start: 2021-09-04 | End: 2021-09-04

## 2021-09-04 RX ORDER — ROCURONIUM BROMIDE 10 MG/ML
INJECTION, SOLUTION INTRAVENOUS PRN
Status: DISCONTINUED | OUTPATIENT
Start: 2021-09-04 | End: 2021-09-04 | Stop reason: SURG

## 2021-09-04 RX ORDER — ONDANSETRON 2 MG/ML
4 INJECTION INTRAMUSCULAR; INTRAVENOUS EVERY 4 HOURS PRN
Status: DISCONTINUED | OUTPATIENT
Start: 2021-09-04 | End: 2021-09-25

## 2021-09-04 RX ORDER — CALCIUM GLUCONATE 20 MG/ML
2 INJECTION, SOLUTION INTRAVENOUS ONCE
Status: COMPLETED | OUTPATIENT
Start: 2021-09-04 | End: 2021-09-04

## 2021-09-04 RX ORDER — ACETAMINOPHEN 650 MG/1
650 SUPPOSITORY RECTAL EVERY 4 HOURS PRN
Status: DISCONTINUED | OUTPATIENT
Start: 2021-09-04 | End: 2021-09-07

## 2021-09-04 RX ORDER — HEPARIN SODIUM,PORCINE 1000/ML
VIAL (ML) INJECTION
Status: DISCONTINUED | OUTPATIENT
Start: 2021-09-04 | End: 2021-09-04 | Stop reason: HOSPADM

## 2021-09-04 RX ORDER — SODIUM CHLORIDE 9 MG/ML
500 INJECTION, SOLUTION INTRAVENOUS ONCE
Status: COMPLETED | OUTPATIENT
Start: 2021-09-04 | End: 2021-09-04

## 2021-09-04 RX ORDER — SODIUM CHLORIDE 9 MG/ML
INJECTION, SOLUTION INTRAVENOUS CONTINUOUS
Status: DISCONTINUED | OUTPATIENT
Start: 2021-09-04 | End: 2021-09-10

## 2021-09-04 RX ORDER — SODIUM CHLORIDE, SODIUM LACTATE, POTASSIUM CHLORIDE, AND CALCIUM CHLORIDE .6; .31; .03; .02 G/100ML; G/100ML; G/100ML; G/100ML
1500 INJECTION, SOLUTION INTRAVENOUS ONCE
Status: COMPLETED | OUTPATIENT
Start: 2021-09-04 | End: 2021-09-04

## 2021-09-04 RX ORDER — HYDROMORPHONE HYDROCHLORIDE 1 MG/ML
0.5 INJECTION, SOLUTION INTRAMUSCULAR; INTRAVENOUS; SUBCUTANEOUS
Status: DISCONTINUED | OUTPATIENT
Start: 2021-09-04 | End: 2021-09-04

## 2021-09-04 RX ORDER — HYDROMORPHONE HYDROCHLORIDE 1 MG/ML
0.5 INJECTION, SOLUTION INTRAMUSCULAR; INTRAVENOUS; SUBCUTANEOUS
Status: DISCONTINUED | OUTPATIENT
Start: 2021-09-04 | End: 2021-09-11

## 2021-09-04 RX ORDER — CALCIUM CHLORIDE 100 MG/ML
1 INJECTION INTRAVENOUS; INTRAVENTRICULAR ONCE
Status: DISCONTINUED | OUTPATIENT
Start: 2021-09-04 | End: 2021-09-04

## 2021-09-04 RX ORDER — DEXTROSE MONOHYDRATE 25 G/50ML
INJECTION, SOLUTION INTRAVENOUS
Status: ACTIVE
Start: 2021-09-04 | End: 2021-09-04

## 2021-09-04 RX ORDER — SODIUM CHLORIDE, SODIUM GLUCONATE, SODIUM ACETATE, POTASSIUM CHLORIDE AND MAGNESIUM CHLORIDE 526; 502; 368; 37; 30 MG/100ML; MG/100ML; MG/100ML; MG/100ML; MG/100ML
INJECTION, SOLUTION INTRAVENOUS
Status: DISCONTINUED | OUTPATIENT
Start: 2021-09-04 | End: 2021-09-04 | Stop reason: SURG

## 2021-09-04 RX ORDER — FAMOTIDINE 20 MG/1
20 TABLET, FILM COATED ORAL 2 TIMES DAILY
Status: DISCONTINUED | OUTPATIENT
Start: 2021-09-04 | End: 2021-09-07

## 2021-09-04 RX ORDER — AMOXICILLIN 250 MG
1 CAPSULE ORAL NIGHTLY
Status: DISCONTINUED | OUTPATIENT
Start: 2021-09-04 | End: 2021-09-04

## 2021-09-04 RX ADMIN — KETAMINE HYDROCHLORIDE 50 MG: 50 INJECTION INTRAMUSCULAR; INTRAVENOUS at 02:57

## 2021-09-04 RX ADMIN — CALCIUM GLUCONATE 2 G: 20 INJECTION, SOLUTION INTRAVENOUS at 09:37

## 2021-09-04 RX ADMIN — KETAMINE HYDROCHLORIDE 50 MG: 50 INJECTION INTRAMUSCULAR; INTRAVENOUS at 02:48

## 2021-09-04 RX ADMIN — HYDROMORPHONE HYDROCHLORIDE 0.5 MG: 1 INJECTION, SOLUTION INTRAMUSCULAR; INTRAVENOUS; SUBCUTANEOUS at 22:34

## 2021-09-04 RX ADMIN — MIDAZOLAM HYDROCHLORIDE 2 MG: 1 INJECTION, SOLUTION INTRAMUSCULAR; INTRAVENOUS at 19:07

## 2021-09-04 RX ADMIN — FAMOTIDINE 20 MG: 10 INJECTION INTRAVENOUS at 07:39

## 2021-09-04 RX ADMIN — SODIUM CHLORIDE 500 ML: 9 INJECTION, SOLUTION INTRAVENOUS at 09:37

## 2021-09-04 RX ADMIN — PROPOFOL 60 MCG/KG/MIN: 10 INJECTION, EMULSION INTRAVENOUS at 10:24

## 2021-09-04 RX ADMIN — IOHEXOL 90 ML: 350 INJECTION, SOLUTION INTRAVENOUS at 03:37

## 2021-09-04 RX ADMIN — SODIUM BICARBONATE 50 MEQ: 84 INJECTION, SOLUTION INTRAVENOUS at 06:01

## 2021-09-04 RX ADMIN — SODIUM CHLORIDE, SODIUM GLUCONATE, SODIUM ACETATE, POTASSIUM CHLORIDE AND MAGNESIUM CHLORIDE: 526; 502; 368; 37; 30 INJECTION, SOLUTION INTRAVENOUS at 05:17

## 2021-09-04 RX ADMIN — PROPOFOL 60 MCG/KG/MIN: 10 INJECTION, EMULSION INTRAVENOUS at 15:05

## 2021-09-04 RX ADMIN — ROCURONIUM BROMIDE 25 MG: 10 INJECTION, SOLUTION INTRAVENOUS at 05:16

## 2021-09-04 RX ADMIN — SODIUM CHLORIDE 500 ML: 9 INJECTION, SOLUTION INTRAVENOUS at 04:12

## 2021-09-04 RX ADMIN — HYDROMORPHONE HYDROCHLORIDE 0.5 MG: 1 INJECTION, SOLUTION INTRAMUSCULAR; INTRAVENOUS; SUBCUTANEOUS at 20:32

## 2021-09-04 RX ADMIN — CALCIUM CHLORIDE 1000 MG: 100 INJECTION, SOLUTION INTRAVENOUS at 15:18

## 2021-09-04 RX ADMIN — FENTANYL CITRATE 50 MCG: 50 INJECTION, SOLUTION INTRAMUSCULAR; INTRAVENOUS at 02:47

## 2021-09-04 RX ADMIN — FAMOTIDINE 20 MG: 10 INJECTION INTRAVENOUS at 17:17

## 2021-09-04 RX ADMIN — PIPERACILLIN AND TAZOBACTAM 3.38 G: 3; .375 INJECTION, POWDER, LYOPHILIZED, FOR SOLUTION INTRAVENOUS; PARENTERAL at 20:32

## 2021-09-04 RX ADMIN — SODIUM CHLORIDE 1000 ML: 9 INJECTION, SOLUTION INTRAVENOUS at 02:42

## 2021-09-04 RX ADMIN — SODIUM CHLORIDE, SODIUM GLUCONATE, SODIUM ACETATE, POTASSIUM CHLORIDE AND MAGNESIUM CHLORIDE: 526; 502; 368; 37; 30 INJECTION, SOLUTION INTRAVENOUS at 05:01

## 2021-09-04 RX ADMIN — DEXTROSE MONOHYDRATE 50 ML: 25 INJECTION, SOLUTION INTRAVENOUS at 07:13

## 2021-09-04 RX ADMIN — PROPOFOL 5 MCG/KG/MIN: 10 INJECTION, EMULSION INTRAVENOUS at 03:30

## 2021-09-04 RX ADMIN — SODIUM CHLORIDE, POTASSIUM CHLORIDE, SODIUM LACTATE AND CALCIUM CHLORIDE 1500 ML: 600; 310; 30; 20 INJECTION, SOLUTION INTRAVENOUS at 17:16

## 2021-09-04 RX ADMIN — ROCURONIUM BROMIDE 50 MG: 10 INJECTION, SOLUTION INTRAVENOUS at 04:36

## 2021-09-04 RX ADMIN — PROPOFOL 60 MCG/KG/MIN: 10 INJECTION, EMULSION INTRAVENOUS at 19:20

## 2021-09-04 RX ADMIN — PIPERACILLIN AND TAZOBACTAM 3.38 G: 3; .375 INJECTION, POWDER, LYOPHILIZED, FOR SOLUTION INTRAVENOUS; PARENTERAL at 07:31

## 2021-09-04 RX ADMIN — SODIUM CHLORIDE: 9 INJECTION, SOLUTION INTRAVENOUS at 18:20

## 2021-09-04 RX ADMIN — PROPOFOL 60 MCG/KG/MIN: 10 INJECTION, EMULSION INTRAVENOUS at 22:35

## 2021-09-04 RX ADMIN — SODIUM CHLORIDE: 9 INJECTION, SOLUTION INTRAVENOUS at 03:52

## 2021-09-04 RX ADMIN — SODIUM CHLORIDE: 9 INJECTION, SOLUTION INTRAVENOUS at 07:34

## 2021-09-04 RX ADMIN — HYDROMORPHONE HYDROCHLORIDE 0.5 MG: 1 INJECTION, SOLUTION INTRAMUSCULAR; INTRAVENOUS; SUBCUTANEOUS at 18:21

## 2021-09-04 RX ADMIN — SODIUM CHLORIDE, SODIUM GLUCONATE, SODIUM ACETATE, POTASSIUM CHLORIDE AND MAGNESIUM CHLORIDE: 526; 502; 368; 37; 30 INJECTION, SOLUTION INTRAVENOUS at 05:27

## 2021-09-04 RX ADMIN — HYDROMORPHONE HYDROCHLORIDE 0.5 MG: 1 INJECTION, SOLUTION INTRAMUSCULAR; INTRAVENOUS; SUBCUTANEOUS at 11:12

## 2021-09-04 RX ADMIN — SODIUM BICARBONATE 50 MEQ: 84 INJECTION, SOLUTION INTRAVENOUS at 05:34

## 2021-09-04 RX ADMIN — SODIUM BICARBONATE 50 MEQ: 84 INJECTION, SOLUTION INTRAVENOUS at 06:05

## 2021-09-04 RX ADMIN — CEFOTETAN DISODIUM 2 G: 2 INJECTION, POWDER, FOR SOLUTION INTRAMUSCULAR; INTRAVENOUS at 04:46

## 2021-09-04 RX ADMIN — ROCURONIUM BROMIDE 50 MG: 10 INJECTION, SOLUTION INTRAVENOUS at 02:57

## 2021-09-04 RX ADMIN — HYDROMORPHONE HYDROCHLORIDE 0.5 MG: 1 INJECTION, SOLUTION INTRAMUSCULAR; INTRAVENOUS; SUBCUTANEOUS at 14:25

## 2021-09-04 RX ADMIN — SODIUM CHLORIDE, SODIUM GLUCONATE, SODIUM ACETATE, POTASSIUM CHLORIDE AND MAGNESIUM CHLORIDE: 526; 502; 368; 37; 30 INJECTION, SOLUTION INTRAVENOUS at 04:51

## 2021-09-04 RX ADMIN — SODIUM CHLORIDE, SODIUM GLUCONATE, SODIUM ACETATE, POTASSIUM CHLORIDE AND MAGNESIUM CHLORIDE: 526; 502; 368; 37; 30 INJECTION, SOLUTION INTRAVENOUS at 05:59

## 2021-09-04 RX ADMIN — PROPOFOL 20 MCG/KG/MIN: 10 INJECTION, EMULSION INTRAVENOUS at 03:53

## 2021-09-04 RX ADMIN — PIPERACILLIN AND TAZOBACTAM 3.38 G: 3; .375 INJECTION, POWDER, LYOPHILIZED, FOR SOLUTION INTRAVENOUS; PARENTERAL at 11:13

## 2021-09-04 RX ADMIN — SODIUM CHLORIDE, SODIUM GLUCONATE, SODIUM ACETATE, POTASSIUM CHLORIDE AND MAGNESIUM CHLORIDE: 526; 502; 368; 37; 30 INJECTION, SOLUTION INTRAVENOUS at 05:35

## 2021-09-04 RX ADMIN — HYDROMORPHONE HYDROCHLORIDE 0.5 MG: 1 INJECTION, SOLUTION INTRAMUSCULAR; INTRAVENOUS; SUBCUTANEOUS at 07:31

## 2021-09-04 RX ADMIN — SODIUM BICARBONATE 50 MEQ: 84 INJECTION, SOLUTION INTRAVENOUS at 05:29

## 2021-09-04 ASSESSMENT — PAIN DESCRIPTION - PAIN TYPE
TYPE: ACUTE PAIN

## 2021-09-04 ASSESSMENT — FIBROSIS 4 INDEX: FIB4 SCORE: 9.57

## 2021-09-04 ASSESSMENT — PAIN SCALES - GENERAL: PAIN_LEVEL: 0

## 2021-09-04 NOTE — RESPIRATORY CARE
Respiratory Trauma Red Note        0247: ETCO2 monitoring for conscious sedation. 25    0257: Pt intubated by ERP, % O2, 7.5 ETT cuff checked and stylet in place, positive color change (yellow), bilateral breath sounds, and fogging of ETT.    0300: Placed on initial vent settings.

## 2021-09-04 NOTE — ANESTHESIA TIME REPORT
Anesthesia Start and Stop Event Times     Date Time Event    9/4/2021 0434 Ready for Procedure     0435 Anesthesia Start     0708 Anesthesia Stop        Responsible Staff  09/04/21    Name Role Begin End    Gold Howe M.D. Anesth 0435 0708        Preop Diagnosis (Free Text):  Pre-op Diagnosis     Intraabdominal bleeding, ruptured diaphragm, mesenteric tear, liver hemorrhage        Preop Diagnosis (Codes):    Post op Diagnosis  Injury to intraabdominal organ      Premium Reason  A. 3PM - 7AM    Comments:
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
## 2021-09-04 NOTE — PROCEDURES
Procedure Report    Date of Procedure: 9/4/2021    Procedure: Right tube thoracostomy    Surgeon: Jozef Aguayo MD    Diagnosis: Trauma, right fractures    Indications: hemo-Pneumothorax    Procedure in detail: The patient's rightt lateral chest wall was prepped and draped in the standard sterile fashion. Lidocaine was injected in the skin and subcutaneous tissues in the 4th or 5th intercostal space down to the pleura. A 2cm skin incision was made at the level of the nipple in the anterior axillary line. Blunt dissection was used to enter the thoracic cavity. A 36Fr chest tube was inserted and secured to the skin  with suture.  Sterile dressings were applied. The chest tube was attached to -20cm water suction. The patient tolerated the procedure well.  A chest x-ray was ordered for verification.       Jozef Aguayo MD, FACS  [facility]  [phone]

## 2021-09-04 NOTE — ASSESSMENT & PLAN NOTE
Bilateral pulmonary contusions, right greater than left.  Aggressive pulmonary hygiene and serial chest radiography.

## 2021-09-04 NOTE — ANESTHESIA PREPROCEDURE EVALUATION
Relevant Problems   No relevant active problems       Physical Exam    Airway   Mallampati: II  TM distance: >3 FB  Neck ROM: full       Cardiovascular - normal exam  Rhythm: regular  Rate: normal  (-) murmur     Dental - normal exam           Pulmonary - normal exam  Breath sounds clear to auscultation     Abdominal    Neurological - normal exam                 Anesthesia Plan    ASA 3- EMERGENT   ASA physical status emergent criteria: acute hemorrhage    Plan - general       Airway plan will be ETT          Induction: intravenous    Postoperative Plan: Postoperative administration of opioids is intended.    Pertinent diagnostic labs and testing reviewed    Informed Consent:    Anesthetic plan and risks discussed with patient.    Use of blood products discussed with: patient whom consented to blood products.

## 2021-09-04 NOTE — ASSESSMENT & PLAN NOTE
9/4 early am: 2 units prbc given in trauma bay, then 10 units prbc, 4 FFP, one unit of platelets in OR. 6 L crystalloid in OR. 430 cc from cell saver.  9/5 return to OR for 2nd look laparotomy -additional approximately 1 L blood loss.  Given 4 units PRBC and 2 units FFP empirically and 1 U platelets based on TEG  Trend labs and hemodynamics.

## 2021-09-04 NOTE — ASSESSMENT & PLAN NOTE
Ruptured diaphragm, herniation liver into the chest, liver lacerations, laceration of the mesentery with actively bleeding vessel.  9/4 Exploratory laparotomy on admission with two segmental small bowel resections, right diaphragm repair, control of hepatic and mesenteric hemorrhage, damage control abdominal closure.  9/5 Planned second look laparotomy with removal of laparotomy pads, serosal repair of colon, hepatorrhaphy, and delayed primary fascial abdominal closure.  Completed a 4-day course of Zosyn.  9/16 Perihepatic and subcapsular fluid increased with small foci of air, fluid extends inferiorly to anterior right pelvis, increase in perisplenic fluid.  9/17 IR drainage of anterior abdominal fluid collection, fluid culture negative.  9/21 Zosyn completed.  Jozef Aguayo MD. Trauma Surgery.

## 2021-09-04 NOTE — DISCHARGE PLANNING
LSW contacted [employer] dispatch again regarding case to see if they have any new information on patient yet still no changes to case.     Plan: Continue to follow up with [employer] regarding case and locate [facility]

## 2021-09-04 NOTE — OP REPORT
DATE OF OPERATION: 9/4/2021      PREOPERATIVE DIAGNOSIS: Intra-abdominal bleeding  Vaginal foreign body     POSTOPERATIVE DIAGNOSIS: Ruptured diaphragm, herniation liver into the chest, liver lacerations  Laceration of the mesentery with actively bleeding vessel    Temporary abdominal closure abdomen packed with approximately 8 laparotomy pads for liver hemostasis    PROCEDURE PERFORMED:  Small bowel resection x2  Control bleeding loss of the mesentery  Repair diaphragm  Controlled liver hemorrhage with electrocautery and packing  Packing with approximately  8 laparotomy pads    Temporary abdominal closure     Removal of vaginal foreign bodies    SURGEON: SHERRI Cedeno P.A.-C    ANESTHESIOLOGIST: Gold Howe    ANESTHESIA: General endotracheal anesthesia.    ASA CLASSIFICATION: V.    INDICATIONS: The patient is a 35 y.o.-year-old female with clinical and radiographic findings of intra-abdominal bleeding. She  is taken to the operating room for laparotomy  Vaginal foreign body present on CT scan    FINDINGS: Rupture of the diaphragm herniation liver into the chest, tear in the mesentery actively bleeding vessel  Devitalized small bowel  Contusion serosal tears of the colon    Vaginal foreign bodies    WOUND CLASSIFICATION: Class IV, Dirty, Infected.    SPECIMEN:     Segment small bowel x2  Vaginal foreign bodies    ESTIMATED BLOOD LOSS: Massive    PROCEDURE: Following implied consent, the patient was properly identified, taken to the operating room and placed in supine position . Sequential compression devices were employed. The abdomen was prepped and draped into a sterile field.     Midline incision was made sharply down the fascia.  The fascia was elevated incised  Massive hemoperitoneum was evacuated.  Anesthesia continue with resuscitation including massive transfusion protocol    Actively bleeding vessels in the tear in the mesentery are identified suture-ligated  2  segments of devitalized small bowel were resected using a SHAHRAM stapling device  Staple anastomosis was constructed  Mesenteric defect was closed suture    Right-sided diaphragm ruptured with herniation of the liver into the chest.  Liver was reduced in the abdomen.  Diaphragm was reapproximated with Prolene suture    Active areas bleeding liver controlled with electrocautery and packing.    The abdomen irrigated and aspirated.  Final inspection demonstrated;  Liver packing in place with no active bleeding    Large colon demonstrated contusions and serosal tears of the right and transverse colon.  No macey perforation.    Stomach intact with NG tube in place    No bleeding from the spleen    Remaining small bowel intact.  Anastomosis patent  with staples secure with no bleeding no leakage  Mesentery repair intact with no bleeding    Patient remained in critical condition receiving ongoing transfusion  Temporary abdominal closure was obtained with ABThera wound VAC.    Speculum placed in the vagina.  Multiple foreign bodies removed    All sponge, needle, and instrument counts were correct on 2 separate occasions.    She was transferred to the ICU ongoing resuscitation with temporary abdominal closure with approximately 8 laparotomy pads left for liver hemostasis     Plan for return to the OR after resuscitation for removal of laparotomy pads and  second look prior to definitive closure      ____________________________________   Jozef Aguayo M.D.    DD: 9/4/2021  7:16 AM

## 2021-09-04 NOTE — PROGRESS NOTES
from Lab called with critical result of Ca 6.5 at 1321. Critical lab result read back to .   Dr. Mackey notified of critical lab result at 1340.  Critical lab result read back by Dr. Mackey.
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
## 2021-09-04 NOTE — ANESTHESIA PROCEDURE NOTES
Arterial Line  Performed by: Gold Howe M.D.  Authorized by: Gold Howe M.D.     Start Time:  9/4/2021 4:41 AM  End Time:  9/4/2021 4:46 AM  Localization: surface landmarks    Patient Location:  OR  Indication: continuous blood pressure monitoring        Catheter Size:  20 G  Seldinger Technique?: Yes    Laterality:  Right  Site:  Radial artery  Line Secured:  Antimicrobial disc, tape and transparent dressing  Events: patient tolerated procedure well with no complications

## 2021-09-04 NOTE — CARE PLAN
Problem: Ventilation  Goal: Ability to achieve and maintain unassisted ventilation or tolerate decreased levels of ventilator support  Description: Document on Vent flowsheet    1.  Support and monitor invasive and noninvasive mechanical ventilation  2.  Monitor ventilator weaning response  3.  Perform ventilator associated pneumonia prevention interventions  4.  Manage ventilation therapy by monitoring diagnostic test results  Outcome: Not Progressing      Ventilator Daily Summary    Vent Day #1    Ventilator settings changed this shift: lowered oxygen as david    Weaning trials: no    Respiratory Procedures: no    Plan: Continue current ventilator settings and wean mechanical ventilation as tolerated per physician orders.
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
## 2021-09-04 NOTE — ASSESSMENT & PLAN NOTE
Prophylactic anticoagulation for thrombotic prevention initially contraindicated secondary to elevated bleeding risk.  9/5 Trauma screening bilateral lower extremity venous duplex negative for above knee DVT.  9/7 Prophylactic dose enoxaparin initiated.    9/13 Trauma screening bilateral lower extremity venous duplex negative for above knee DVT.  9/17 Lovenox held for interventional radiology procedures.  9/18 Lovenox resumed.

## 2021-09-04 NOTE — PROGRESS NOTES
0700:  Dr. Mansoor MATHIS at bedside to assess patient.      0713:  Glucometer reading with critical result of glucose 68 at 0711. Critical lab verfied on glucometer.   Diane trauma NP notified of critical lab result at 0711.  Critical lab result read back by Diane IRELAND.  Dextrose administered.    0834:  from Lab called with critical result of Ca 5.2 at 0834. Critical lab result read back to .   Dr. Aguayo notified of critical lab result at 0848.  Critical lab result read back by Dr. Aguayo.    0848:  Dr. Aguayo at bedside to assess patient.  Reviewed chest tube output, abthera output, am glucose and am calcium.  Orders given and followed.

## 2021-09-04 NOTE — PROGRESS NOTES
"TRAUMA TERTIARY SURVEY     Mental status adequate for full examination?: No  Sedated on propofol, ventilated.     Spine cleared (radiologically and/or clinically): No    PHYSICAL EXAMINATION:  Vitals: /68   Pulse (!) 114   Temp (!) 29.4 °C (85 °F)   Resp (!) 46   Ht 1.626 m (5' 4\")   Wt 59 kg (130 lb)   SpO2 96%   BMI 22.31 kg/m²   Constitutional:     General Appearance: full mechanical ventilator support.  HEENT:     No significant external craniofacial trauma. The pupils are equal, round, and reactive to light bilaterally. The extraocular muscles cannot be assessed.. The nares and oropharynx are clear. The midface and jaw are stable. No malocclusion is evident.  Neck:    The cervical spine is immobilized with a hard collar.  Respiratory:   Inspection: Mechanically ventilated. Right chest tube with serosanguinous output, no air leak.   Palpation:  Np crepitus on chest wall palpation. The clavicles are non deformed bilaterally..   Auscultation: normal, clear to auscultation.  Cardiovascular:   Auscultation: normal and tachycardia.   Peripheral Pulses: Normal.   Abdomen:   Abthera in place, significant distention. Serosanguinous output in abthera cannister.   Genitourinary:   (FEMALE): Gonzalez to gravity.  Musculoskeletal:   The pelvis is stable.  No significant angulation, deformity, or soft tissue injury involving the upper and lower extremities. Normal range of motion.   Back:   The thoracolumbar spine was not examined.   Skin:   The skin is warm and dry. Scattered abrasions.  Neurologic:    Cyndi Coma Scale (GCS) 8T E3V1M4. Anxious, propofol.     IMAGING:  OS-ZPBDCTN-0 VIEW   Final Result         There are at least 7 lap sponges. The 8th sponge is may be folded and jumbled in the RLQ      DX-CHEST-PORTABLE (1 VIEW)   Final Result         There are at least 7 lap sponges. The 8th sponge is may be folded and jumbled in the RLQ      CT-CSPINE WITHOUT PLUS RECONS   Final Result      Fracture of the C2 " vertebrae which involves the lateral mass to the right and left of the midline and extends through the base of the odontoid. There is 1 mm displacement of the odontoid with respect to the vertebral body.      This was discussed with ALMAZ IYER at 4:15 AM on 9/4/2021.      CT-HEAD W/O   Final Result      No evidence of acute intracranial process.      CT-TSPINE W/O PLUS RECONS   Final Result      No evidence of fracture or dislocation of the thoracic spine.      CT-LSPINE W/O PLUS RECONS   Final Result      No evidence of fracture of the lumbar spine.      CT-CHEST,ABDOMEN,PELVIS WITH   Final Result      1.  Area of linear contrast enhancement within the peritoneal cavity in the area of small intestine within the right abdomen consistent with active mesenteric bleeding. There is also a large hemoperitoneum and a small amount of free intraperitoneal air    or is some for bowel injury.      2.  Moderate sized right hemothorax.      3.  Ill-defined areas of low-attenuation within the right and left lobes of the liver likely representing hepatic contusions or small lacerations consistent with grade 2 hepatic injury. No active extravasation of contrast.      4.  Bilateral pulmonary contusions, right greater than left.      5.  Fractures of the right anterior fifth through eighth ribs.      6.  Some fluid and gas within the posterior mediastinum possibly related to presence of pleural effusion and pneumothorax.      7.  Small pericardial effusion.      8.  Metallic foreign bodies within the stomach, colon and vagina.      9.  This was discussed with ALMAZ IYER at 4:15 AM on 9/4/2021.      CT-CTA NECK WITH & W/O-POST PROCESSING   Final Result      Patent carotid and vertebral arteries.      US-ABDOMEN F.A.S.T. LTD (FOR ED USE ONLY)   Final Result      1.  No abdominal free fluid.      2.  Free fluid within the right chest.            DX-CHEST-LIMITED (1 VIEW)   Final Result      1.  Interval placement of a right  chest tube with decrease in right pleural effusion.      2.  Elevation of the right hemidiaphragm.      3.  Right rib fractures.      DX-CHEST-LIMITED (1 VIEW)   Final Result      1.  Large right pleural effusion.      2.  Right anterior rib fractures.      DX-PELVIS-1 OR 2 VIEWS   Final Result      No evidence of fracture or dislocation.        All current laboratory studies/radiology exams reviewed: Yes    Completed Consultations:  Neuro - Max     Pending Consultations:  None    Newly Identified Diagnoses and Injuries:  None    TOTAL RAP SCORE:  RAP Score Total: 6      ETOH Screening     Reason for no ETOH Intervention: Intubated  Assesment ETOH: Admission BAL 84.
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
## 2021-09-04 NOTE — ANESTHESIA PROCEDURE NOTES
Central Venous Line  Performed by: Gold Howe M.D.  Authorized by: Gold Howe M.D.     Start Time:  9/4/2021 6:42 AM  End Time:  9/4/2021 6:52 AM  Patient Location:  OB  Indication: central venous access and hemodynamic monitoring        provider hand hygiene performed prior to central venous catheter insertion, all 5 sterile barriers used (gloves, gown, cap, mask, large sterile drape) during central venous catheter insertion and skin prep agent completely dried prior to procedure    Patient Position:  Trendelenburg  Laterality:  Left  Site:  Subclavian  Prep:  Chlorhexidine  Catheter Size:  7 Fr  Catheter Length (cm):  20  Number of Lumens:  Triple lumen  target vein identified, needle advanced into vein and blood aspirated and guidewire advanced into vein    Seldinger Technique?: Yes    Ultrasound-Guided: ultrasound-guided  Image captured, interpreted and electronically stored.  Sterile Gel and Probe Cover Used for Ultrasound?: Yes    Intravenous Verification: verified by ultrasound, venous blood return and chest x-ray pending    all ports aspirated, all ports flushed easily, guidewire was removed intact, biopatch was applied, line was sutured in place and dressing was applied    Events: patient tolerated procedure well with no complications

## 2021-09-04 NOTE — ASSESSMENT & PLAN NOTE
9/4 Spray bottle cap, wire, small tube, dice, ping pong ball, and 2 small pieces of glass removed from vagina. Stool like smell from dice and ping pong ball. No obvious rectovaginal fisutla. Rectovaginal wall intact.

## 2021-09-04 NOTE — PROGRESS NOTES
0340: Pt arrived to ICU intubated    Vitals:   · HR: 130  · BP: 118/75  · APV CMV 2350/10/100%  · Wt: 55.8 kg  · Temp 95.7   _____________________________________________________________    2 RN skin check completed with Lillie GUDINO    Areas of concern/Skin observations:  · Scars lower back  · bruising right shoulder  · CT right side gauze and tape  · Abrasion to mid back    Devices in use, assessed under and interventions (as appropriate) for skin protection:  · SCDs, BP cuff, SaO2 monitor  ·     Interventions in place such as:   · q2 hour turns  · Keeping skin clean and dry  · Use of products such as barrier wipes/cream  · Rotating ET tube q2h (in coordination with RTs)  ______________________________________________________________

## 2021-09-04 NOTE — ASSESSMENT & PLAN NOTE
Intubated in trauma bay.   9/6 Extubated. Reintubated later in the day for acute pulmonary decompensation.  9/9 Extubated. Continue aggressive pulmonary care and hygiene.  9/11 Diuresis.

## 2021-09-04 NOTE — ASSESSMENT & PLAN NOTE
Persistent hypocalcemia associated with large volume blood product transfusion.   9/4 replete and trend
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
## 2021-09-04 NOTE — PROGRESS NOTES
Reviewed labs and abthera output with Dr. Mackey.  Orders to replace Calcium.  Will monitor abthera output and report to MD this afternoon.

## 2021-09-04 NOTE — ASSESSMENT & PLAN NOTE
Right chest tube placed in trauma bay for hemothorax.  9/8 Chest tube to waterseal.  9/10 Chest tube removed.  9/13 New opacity of the right upper lobe, appearance suggests loculated pleural effusion. Afebrile. IPV, IV lasix, IS, mucinex.  9/14 Interval improvement of right upper lobe loculated effusion or lobar atelectasis but increased pulmonary edema. IV lasix repeated.  9/16 Effusion without resolution. CT chest/abd/pelvis with increased loculating effusion within the right hemothorax.  9/17 IR chest tube with immediate evacuation of 1L serosang fluid, fluid culture sent.  9/19 CXR stable. CT to water seal.  9/21 Zosyn completed. Final culture results with no growth.  9/23 Chest tube drain removed by IR.  9/24 No pneumothorax identified on chest xray.  9/27  Continue lasix, mucinex.  9/28 Lasix completed.  Daily chest radiography.
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
## 2021-09-04 NOTE — CONSULTS
Type two dens fx    [place] J at all times   c-spine precautions   MRI c-spine if possible within 48 hours of injury    Fracture in well approximated may heal in collar without surgery     Max    Full note dictated and will follow

## 2021-09-04 NOTE — PROGRESS NOTES
0655:  Patient to S104 via OR team.  Bedside report received from anesthesiologist.  Diane trauma NP at bedside. Complete linen change and skin assessment.    2 RN belongings check:  Small silver piercing in specimen cup, locked in top drawer.  Belongings found in vaginal cavity in lab for pathology per Diane APRN.     4 Eyes Skin Assessment Completed by TERESE Serna and TERESE Wilkinson.    Head WDL  Ears WDL  Nose Redness- small round redness noted to nose  Mouth WDL  Neck Redness to posterior neck under c-collar, mepilex placed  Breast/Chest WDL  Shoulder Blades WDL  Spine Redness - small little beads in linens, ?broken beaded necklace. 1 area on upper spine slow to antoine.  (R) Arm/Elbow/Hand WDL  (L) Arm/Elbow/Hand Abrasion  Abdomen Incision- open abdomen, covered with abthera.  Large, distended.  Groin Redness and Bruising, bruising to L. Inner thigh.  Vaginal bleeding noted.  Scrotum/Coccyx/Buttocks WDL- mepilex for prevention  (R) Leg WDL  (L) Leg WDL  (R) Heel/Foot/Toe WDL  (L) Heel/Foot/Toe Scar - purple scaring noted to L. foot    Devices In Places ECG, Blood Pressure Cuff, Pulse Ox, Gonzalez, Arterial Line, SCD's, ET Tube, OG/NG and Central Line      Interventions In Place Sacral Mepilex, TAP System, Pillows, Q2 Turns and Low Air Loss Mattress    Possible Skin Injury No    Pictures Uploaded Into Epic Yes  Wound Consult Placed N/A  RN Wound Prevention Protocol Ordered Yes

## 2021-09-05 ENCOUNTER — APPOINTMENT (OUTPATIENT)
Dept: RADIOLOGY | Facility: MEDICAL CENTER | Age: 36
DRG: 957 | End: 2021-09-05
Attending: SURGERY
Payer: MEDICAID

## 2021-09-05 ENCOUNTER — APPOINTMENT (OUTPATIENT)
Dept: RADIOLOGY | Facility: MEDICAL CENTER | Age: 36
DRG: 957 | End: 2021-09-05
Attending: SPECIALIST
Payer: MEDICAID

## 2021-09-05 ENCOUNTER — ANESTHESIA (OUTPATIENT)
Dept: SURGERY | Facility: MEDICAL CENTER | Age: 36
DRG: 957 | End: 2021-09-05
Payer: MEDICAID

## 2021-09-05 ENCOUNTER — ANESTHESIA EVENT (OUTPATIENT)
Dept: SURGERY | Facility: MEDICAL CENTER | Age: 36
DRG: 957 | End: 2021-09-05
Payer: MEDICAID

## 2021-09-05 PROBLEM — T18.3XXA FOREIGN BODY IN INTESTINE: Status: ACTIVE | Noted: 2021-09-05

## 2021-09-05 PROBLEM — R79.89 ELEVATED LFTS: Status: RESOLVED | Noted: 2021-09-04 | Resolved: 2021-09-05

## 2021-09-05 LAB
ALBUMIN SERPL BCP-MCNC: 1.4 G/DL (ref 3.2–4.9)
ALBUMIN SERPL BCP-MCNC: 2 G/DL (ref 3.2–4.9)
ALBUMIN SERPL BCP-MCNC: 2.1 G/DL (ref 3.2–4.9)
ALBUMIN SERPL BCP-MCNC: 2.4 G/DL (ref 3.2–4.9)
ALBUMIN/GLOB SERPL: 1 G/DL
ALBUMIN/GLOB SERPL: 1 G/DL
ALBUMIN/GLOB SERPL: 1.1 G/DL
ALBUMIN/GLOB SERPL: ABNORMAL G/DL
ALP SERPL-CCNC: 67 U/L (ref 30–99)
ALP SERPL-CCNC: 80 U/L (ref 30–99)
ALP SERPL-CCNC: 83 U/L (ref 30–99)
ALP SERPL-CCNC: 94 U/L (ref 30–99)
ALT SERPL-CCNC: 203 U/L (ref 2–50)
ALT SERPL-CCNC: 223 U/L (ref 2–50)
ALT SERPL-CCNC: 268 U/L (ref 2–50)
ALT SERPL-CCNC: 338 U/L (ref 2–50)
ANION GAP SERPL CALC-SCNC: 6 MMOL/L (ref 7–16)
ANION GAP SERPL CALC-SCNC: 6 MMOL/L (ref 7–16)
ANION GAP SERPL CALC-SCNC: 8 MMOL/L (ref 7–16)
ANION GAP SERPL CALC-SCNC: 9 MMOL/L (ref 7–16)
ANISOCYTOSIS BLD QL SMEAR: ABNORMAL
ANISOCYTOSIS BLD QL SMEAR: ABNORMAL
AST SERPL-CCNC: 337 U/L (ref 12–45)
AST SERPL-CCNC: 347 U/L (ref 12–45)
AST SERPL-CCNC: 377 U/L (ref 12–45)
AST SERPL-CCNC: 511 U/L (ref 12–45)
BARCODED ABORH UBTYP: 5100
BARCODED ABORH UBTYP: 6200
BARCODED ABORH UBTYP: 7300
BARCODED ABORH UBTYP: 9500
BARCODED PRD CODE UBPRD: NORMAL
BARCODED UNIT NUM UBUNT: NORMAL
BASE EXCESS BLDA CALC-SCNC: -4 MMOL/L (ref -4–3)
BASE EXCESS BLDA CALC-SCNC: 0 MMOL/L (ref -4–3)
BASOPHILS # BLD AUTO: 0 % (ref 0–1.8)
BASOPHILS # BLD AUTO: 0.2 % (ref 0–1.8)
BASOPHILS # BLD AUTO: 0.9 % (ref 0–1.8)
BASOPHILS # BLD: 0 K/UL (ref 0–0.12)
BASOPHILS # BLD: 0.03 K/UL (ref 0–0.12)
BASOPHILS # BLD: 0.1 K/UL (ref 0–0.12)
BILIRUB SERPL-MCNC: 0.2 MG/DL (ref 0.1–1.5)
BILIRUB SERPL-MCNC: 0.3 MG/DL (ref 0.1–1.5)
BILIRUB SERPL-MCNC: 0.4 MG/DL (ref 0.1–1.5)
BILIRUB SERPL-MCNC: 0.5 MG/DL (ref 0.1–1.5)
BODY TEMPERATURE: ABNORMAL DEGREES
BODY TEMPERATURE: ABNORMAL DEGREES
BREATHS SETTING VENT: 20
BUN SERPL-MCNC: 13 MG/DL (ref 8–22)
BUN SERPL-MCNC: 14 MG/DL (ref 8–22)
BUN SERPL-MCNC: 14 MG/DL (ref 8–22)
BUN SERPL-MCNC: 15 MG/DL (ref 8–22)
BURR CELLS BLD QL SMEAR: NORMAL
CA-I BLD ISE-SCNC: 1.04 MMOL/L (ref 1.1–1.3)
CA-I SERPL-SCNC: 1 MMOL/L (ref 1.1–1.3)
CALCIUM SERPL-MCNC: 6.1 MG/DL (ref 8.5–10.5)
CALCIUM SERPL-MCNC: 6.8 MG/DL (ref 8.5–10.5)
CALCIUM SERPL-MCNC: 7.3 MG/DL (ref 8.5–10.5)
CALCIUM SERPL-MCNC: 7.5 MG/DL (ref 8.5–10.5)
CFT BLD TEG: 4.3 MIN (ref 4.6–9.1)
CFT P HPASE BLD TEG: 4.8 MIN (ref 4.3–8.3)
CHLORIDE SERPL-SCNC: 110 MMOL/L (ref 96–112)
CHLORIDE SERPL-SCNC: 111 MMOL/L (ref 96–112)
CHLORIDE SERPL-SCNC: 113 MMOL/L (ref 96–112)
CHLORIDE SERPL-SCNC: 113 MMOL/L (ref 96–112)
CLOT ANGLE BLD TEG: 71.3 DEGREES (ref 63–78)
CLOT LYSIS 30M P MA LENFR BLD TEG: 0 % (ref 0–2.6)
CO2 BLDA-SCNC: 25 MMOL/L (ref 20–33)
CO2 BLDA-SCNC: 26 MMOL/L (ref 20–33)
CO2 SERPL-SCNC: 21 MMOL/L (ref 20–33)
CO2 SERPL-SCNC: 23 MMOL/L (ref 20–33)
CO2 SERPL-SCNC: 23 MMOL/L (ref 20–33)
CO2 SERPL-SCNC: 24 MMOL/L (ref 20–33)
COMPONENT F 8504F: NORMAL
COMPONENT F 8504F: NORMAL
COMPONENT P 8504P: NORMAL
COMPONENT R 8504R: NORMAL
CREAT SERPL-MCNC: 0.58 MG/DL (ref 0.5–1.4)
CREAT SERPL-MCNC: 0.59 MG/DL (ref 0.5–1.4)
CREAT SERPL-MCNC: 0.63 MG/DL (ref 0.5–1.4)
CREAT SERPL-MCNC: 0.7 MG/DL (ref 0.5–1.4)
CT.EXTRINSIC BLD ROTEM: 1.9 MIN (ref 0.8–2.1)
DELSYS IDSYS: ABNORMAL
EOSINOPHIL # BLD AUTO: 0 K/UL (ref 0–0.51)
EOSINOPHIL # BLD AUTO: 0 K/UL (ref 0–0.51)
EOSINOPHIL # BLD AUTO: 0.1 K/UL (ref 0–0.51)
EOSINOPHIL NFR BLD: 0 % (ref 0–6.9)
EOSINOPHIL NFR BLD: 0 % (ref 0–6.9)
EOSINOPHIL NFR BLD: 0.9 % (ref 0–6.9)
ERYTHROCYTE [DISTWIDTH] IN BLOOD BY AUTOMATED COUNT: 53.3 FL (ref 35.9–50)
ERYTHROCYTE [DISTWIDTH] IN BLOOD BY AUTOMATED COUNT: 61.1 FL (ref 35.9–50)
ERYTHROCYTE [DISTWIDTH] IN BLOOD BY AUTOMATED COUNT: 62.4 FL (ref 35.9–50)
GLOBULIN SER CALC-MCNC: 2 G/DL (ref 1.9–3.5)
GLOBULIN SER CALC-MCNC: 2.2 G/DL (ref 1.9–3.5)
GLOBULIN SER CALC-MCNC: 2.2 G/DL (ref 1.9–3.5)
GLOBULIN SER CALC-MCNC: ABNORMAL G/DL (ref 1.9–3.5)
GLUCOSE SERPL-MCNC: 117 MG/DL (ref 65–99)
GLUCOSE SERPL-MCNC: 126 MG/DL (ref 65–99)
GLUCOSE SERPL-MCNC: 135 MG/DL (ref 65–99)
GLUCOSE SERPL-MCNC: 143 MG/DL (ref 65–99)
HCO3 BLDA-SCNC: 23 MMOL/L (ref 17–25)
HCO3 BLDA-SCNC: 25.2 MMOL/L (ref 17–25)
HCT VFR BLD AUTO: 29.6 % (ref 37–47)
HCT VFR BLD AUTO: 36.3 % (ref 37–47)
HCT VFR BLD AUTO: 40 % (ref 37–47)
HCT VFR BLD AUTO: 42.6 % (ref 37–47)
HCT VFR BLD CALC: 21 % (ref 37–47)
HGB BLD-MCNC: 12.4 G/DL (ref 12–16)
HGB BLD-MCNC: 13.7 G/DL (ref 12–16)
HGB BLD-MCNC: 14.7 G/DL (ref 12–16)
HGB BLD-MCNC: 7.1 G/DL (ref 12–16)
HGB BLD-MCNC: 9.9 G/DL (ref 12–16)
HOROWITZ INDEX BLDA+IHG-RTO: 114 MM[HG]
HOROWITZ INDEX BLDA+IHG-RTO: 148 MM[HG]
IMM GRANULOCYTES # BLD AUTO: 0.07 K/UL (ref 0–0.11)
IMM GRANULOCYTES NFR BLD AUTO: 0.5 % (ref 0–0.9)
LYMPHOCYTES # BLD AUTO: 1.08 K/UL (ref 1–4.8)
LYMPHOCYTES # BLD AUTO: 1.08 K/UL (ref 1–4.8)
LYMPHOCYTES # BLD AUTO: 1.12 K/UL (ref 1–4.8)
LYMPHOCYTES NFR BLD: 7.4 % (ref 22–41)
LYMPHOCYTES NFR BLD: 7.8 % (ref 22–41)
LYMPHOCYTES NFR BLD: 9.6 % (ref 22–41)
MANUAL DIFF BLD: NORMAL
MANUAL DIFF BLD: NORMAL
MCF BLD TEG: 50.6 MM (ref 52–69)
MCF.PLATELET INHIB BLD ROTEM: 16.6 MM (ref 15–32)
MCH RBC QN AUTO: 30.4 PG (ref 27–33)
MCH RBC QN AUTO: 30.9 PG (ref 27–33)
MCH RBC QN AUTO: 31.2 PG (ref 27–33)
MCHC RBC AUTO-ENTMCNC: 34.2 G/DL (ref 33.6–35)
MCHC RBC AUTO-ENTMCNC: 34.3 G/DL (ref 33.6–35)
MCHC RBC AUTO-ENTMCNC: 34.5 G/DL (ref 33.6–35)
MCV RBC AUTO: 88.7 FL (ref 81.4–97.8)
MCV RBC AUTO: 89.7 FL (ref 81.4–97.8)
MCV RBC AUTO: 91.2 FL (ref 81.4–97.8)
MICROCYTES BLD QL SMEAR: ABNORMAL
MICROCYTES BLD QL SMEAR: ABNORMAL
MODE IMODE: ABNORMAL
MONOCYTES # BLD AUTO: 0.83 K/UL (ref 0–0.85)
MONOCYTES # BLD AUTO: 0.88 K/UL (ref 0–0.85)
MONOCYTES # BLD AUTO: 1.46 K/UL (ref 0–0.85)
MONOCYTES NFR BLD AUTO: 6 % (ref 0–13.4)
MONOCYTES NFR BLD AUTO: 7.9 % (ref 0–13.4)
MONOCYTES NFR BLD AUTO: 9.7 % (ref 0–13.4)
MORPHOLOGY BLD-IMP: NORMAL
MORPHOLOGY BLD-IMP: NORMAL
NEUTROPHILS # BLD AUTO: 11.98 K/UL (ref 2–7.15)
NEUTROPHILS # BLD AUTO: 12.39 K/UL (ref 2–7.15)
NEUTROPHILS # BLD AUTO: 9.04 K/UL (ref 2–7.15)
NEUTROPHILS NFR BLD: 72.8 % (ref 44–72)
NEUTROPHILS NFR BLD: 82.2 % (ref 44–72)
NEUTROPHILS NFR BLD: 82.8 % (ref 44–72)
NEUTS BAND NFR BLD MANUAL: 3.4 % (ref 0–10)
NEUTS BAND NFR BLD MANUAL: 7.9 % (ref 0–10)
NRBC # BLD AUTO: 0 K/UL
NRBC # BLD AUTO: 0 K/UL
NRBC # BLD AUTO: 0.02 K/UL
NRBC BLD-RTO: 0 /100 WBC
NRBC BLD-RTO: 0 /100 WBC
NRBC BLD-RTO: 0.2 /100 WBC
O2/TOTAL GAS SETTING VFR VENT: 50 %
O2/TOTAL GAS SETTING VFR VENT: 95 %
OVALOCYTES BLD QL SMEAR: NORMAL
PCO2 BLDA: 42.3 MMHG (ref 26–37)
PCO2 BLDA: 50.9 MMHG (ref 26–37)
PCO2 TEMP ADJ BLDA: 43.9 MMHG (ref 26–37)
PCO2 TEMP ADJ BLDA: 52.3 MMHG (ref 26–37)
PEEP END EXPIRATORY PRESSURE IPEEP: 10 CMH20
PH BLDA: 7.26 [PH] (ref 7.4–7.5)
PH BLDA: 7.38 [PH] (ref 7.4–7.5)
PH TEMP ADJ BLDA: 7.25 [PH] (ref 7.4–7.5)
PH TEMP ADJ BLDA: 7.37 [PH] (ref 7.4–7.5)
PLATELET # BLD AUTO: 128 K/UL (ref 164–446)
PLATELET # BLD AUTO: 138 K/UL (ref 164–446)
PLATELET # BLD AUTO: 88 K/UL (ref 164–446)
PLATELET BLD QL SMEAR: NORMAL
PLATELET BLD QL SMEAR: NORMAL
PMV BLD AUTO: 10.8 FL (ref 9–12.9)
PMV BLD AUTO: 11.2 FL (ref 9–12.9)
PMV BLD AUTO: 11.3 FL (ref 9–12.9)
PO2 BLDA: 108 MMHG (ref 64–87)
PO2 BLDA: 74 MMHG (ref 64–87)
PO2 TEMP ADJ BLDA: 112 MMHG (ref 64–87)
PO2 TEMP ADJ BLDA: 78 MMHG (ref 64–87)
POIKILOCYTOSIS BLD QL SMEAR: NORMAL
POLYCHROMASIA BLD QL SMEAR: NORMAL
POTASSIUM BLD-SCNC: 4.3 MMOL/L (ref 3.6–5.5)
POTASSIUM SERPL-SCNC: 4.4 MMOL/L (ref 3.6–5.5)
POTASSIUM SERPL-SCNC: 4.5 MMOL/L (ref 3.6–5.5)
POTASSIUM SERPL-SCNC: 4.6 MMOL/L (ref 3.6–5.5)
POTASSIUM SERPL-SCNC: 5.1 MMOL/L (ref 3.6–5.5)
PRODUCT TYPE UPROD: NORMAL
PROT SERPL-MCNC: 3 G/DL (ref 6–8.2)
PROT SERPL-MCNC: 4 G/DL (ref 6–8.2)
PROT SERPL-MCNC: 4.3 G/DL (ref 6–8.2)
PROT SERPL-MCNC: 4.6 G/DL (ref 6–8.2)
RBC # BLD AUTO: 3.98 M/UL (ref 4.2–5.4)
RBC # BLD AUTO: 4.51 M/UL (ref 4.2–5.4)
RBC # BLD AUTO: 4.75 M/UL (ref 4.2–5.4)
RBC BLD AUTO: PRESENT
RBC BLD AUTO: PRESENT
SAO2 % BLDA: 94 % (ref 93–99)
SAO2 % BLDA: 97 % (ref 93–99)
SODIUM BLD-SCNC: 143 MMOL/L (ref 135–145)
SODIUM SERPL-SCNC: 140 MMOL/L (ref 135–145)
SODIUM SERPL-SCNC: 140 MMOL/L (ref 135–145)
SODIUM SERPL-SCNC: 143 MMOL/L (ref 135–145)
SODIUM SERPL-SCNC: 144 MMOL/L (ref 135–145)
SPECIMEN DRAWN FROM PATIENT: ABNORMAL
SPECIMEN DRAWN FROM PATIENT: ABNORMAL
TEG ALGORITHM TGALG: ABNORMAL
TIDAL VOLUME IVT: 330 ML
UNIT STATUS USTAT: NORMAL
WBC # BLD AUTO: 11.2 K/UL (ref 4.8–10.8)
WBC # BLD AUTO: 13.9 K/UL (ref 4.8–10.8)
WBC # BLD AUTO: 15.1 K/UL (ref 4.8–10.8)

## 2021-09-05 PROCEDURE — 82330 ASSAY OF CALCIUM: CPT

## 2021-09-05 PROCEDURE — 49002 REOPENING OF ABDOMEN: CPT | Mod: 59,51 | Performed by: SURGERY

## 2021-09-05 PROCEDURE — 306565 RIGID MIT RESTRAINT(PAIR): Performed by: SURGERY

## 2021-09-05 PROCEDURE — 85027 COMPLETE CBC AUTOMATED: CPT | Mod: 91

## 2021-09-05 PROCEDURE — 501838 HCHG SUTURE GENERAL: Performed by: SURGERY

## 2021-09-05 PROCEDURE — 85576 BLOOD PLATELET AGGREGATION: CPT

## 2021-09-05 PROCEDURE — 700111 HCHG RX REV CODE 636 W/ 250 OVERRIDE (IP): Performed by: SPECIALIST

## 2021-09-05 PROCEDURE — 500378 HCHG DRAIN, J-VAC ROUND 19FR: Performed by: SURGERY

## 2021-09-05 PROCEDURE — 74018 RADEX ABDOMEN 1 VIEW: CPT

## 2021-09-05 PROCEDURE — 700105 HCHG RX REV CODE 258: Performed by: SURGERY

## 2021-09-05 PROCEDURE — P9017 PLASMA 1 DONOR FRZ W/IN 8 HR: HCPCS

## 2021-09-05 PROCEDURE — 85384 FIBRINOGEN ACTIVITY: CPT

## 2021-09-05 PROCEDURE — 84295 ASSAY OF SERUM SODIUM: CPT

## 2021-09-05 PROCEDURE — 85347 COAGULATION TIME ACTIVATED: CPT

## 2021-09-05 PROCEDURE — 94003 VENT MGMT INPAT SUBQ DAY: CPT

## 2021-09-05 PROCEDURE — 30233K1 TRANSFUSION OF NONAUTOLOGOUS FROZEN PLASMA INTO PERIPHERAL VEIN, PERCUTANEOUS APPROACH: ICD-10-PCS | Performed by: SURGERY

## 2021-09-05 PROCEDURE — 160048 HCHG OR STATISTICAL LEVEL 1-5: Performed by: SURGERY

## 2021-09-05 PROCEDURE — 160031 HCHG SURGERY MINUTES - 1ST 30 MINS LEVEL 5: Performed by: SURGERY

## 2021-09-05 PROCEDURE — 85007 BL SMEAR W/DIFF WBC COUNT: CPT | Mod: 91

## 2021-09-05 PROCEDURE — 71045 X-RAY EXAM CHEST 1 VIEW: CPT

## 2021-09-05 PROCEDURE — 93970 EXTREMITY STUDY: CPT | Mod: 26 | Performed by: INTERNAL MEDICINE

## 2021-09-05 PROCEDURE — C1729 CATH, DRAINAGE: HCPCS

## 2021-09-05 PROCEDURE — 93970 EXTREMITY STUDY: CPT

## 2021-09-05 PROCEDURE — 160009 HCHG ANES TIME/MIN: Performed by: SURGERY

## 2021-09-05 PROCEDURE — 700111 HCHG RX REV CODE 636 W/ 250 OVERRIDE (IP): Performed by: SURGERY

## 2021-09-05 PROCEDURE — 700101 HCHG RX REV CODE 250: Performed by: SURGERY

## 2021-09-05 PROCEDURE — A6402 STERILE GAUZE <= 16 SQ IN: HCPCS | Performed by: SURGERY

## 2021-09-05 PROCEDURE — 37799 UNLISTED PX VASCULAR SURGERY: CPT

## 2021-09-05 PROCEDURE — 44650 REPAIR BOWEL FISTULA: CPT | Performed by: SURGERY

## 2021-09-05 PROCEDURE — 36430 TRANSFUSION BLD/BLD COMPNT: CPT

## 2021-09-05 PROCEDURE — 85014 HEMATOCRIT: CPT | Mod: 91

## 2021-09-05 PROCEDURE — 0W3G0ZZ CONTROL BLEEDING IN PERITONEAL CAVITY, OPEN APPROACH: ICD-10-PCS | Performed by: SURGERY

## 2021-09-05 PROCEDURE — 99291 CRITICAL CARE FIRST HOUR: CPT | Mod: 25 | Performed by: SURGERY

## 2021-09-05 PROCEDURE — 0DQL0ZZ REPAIR TRANSVERSE COLON, OPEN APPROACH: ICD-10-PCS | Performed by: SURGERY

## 2021-09-05 PROCEDURE — 47362 REPAIR LIVER WOUND: CPT | Mod: 51 | Performed by: SURGERY

## 2021-09-05 PROCEDURE — P9034 PLATELETS, PHERESIS: HCPCS

## 2021-09-05 PROCEDURE — 80053 COMPREHEN METABOLIC PANEL: CPT | Mod: 91

## 2021-09-05 PROCEDURE — 84132 ASSAY OF SERUM POTASSIUM: CPT

## 2021-09-05 PROCEDURE — 86923 COMPATIBILITY TEST ELECTRIC: CPT | Mod: 91

## 2021-09-05 PROCEDURE — 700111 HCHG RX REV CODE 636 W/ 250 OVERRIDE (IP): Performed by: ANESTHESIOLOGY

## 2021-09-05 PROCEDURE — 160042 HCHG SURGERY MINUTES - EA ADDL 1 MIN LEVEL 5: Performed by: SURGERY

## 2021-09-05 PROCEDURE — 99292 CRITICAL CARE ADDL 30 MIN: CPT | Mod: 25 | Performed by: SURGERY

## 2021-09-05 PROCEDURE — P9016 RBC LEUKOCYTES REDUCED: HCPCS | Mod: 91

## 2021-09-05 PROCEDURE — 82803 BLOOD GASES ANY COMBINATION: CPT | Mod: 91

## 2021-09-05 PROCEDURE — 700105 HCHG RX REV CODE 258: Performed by: ANESTHESIOLOGY

## 2021-09-05 PROCEDURE — 32551 INSERTION OF CHEST TUBE: CPT | Mod: 51 | Performed by: SURGERY

## 2021-09-05 PROCEDURE — 85018 HEMOGLOBIN: CPT

## 2021-09-05 PROCEDURE — 700101 HCHG RX REV CODE 250: Performed by: ANESTHESIOLOGY

## 2021-09-05 PROCEDURE — 770022 HCHG ROOM/CARE - ICU (200)

## 2021-09-05 PROCEDURE — 700105 HCHG RX REV CODE 258: Performed by: SPECIALIST

## 2021-09-05 PROCEDURE — 30233L1 TRANSFUSION OF NONAUTOLOGOUS FRESH PLASMA INTO PERIPHERAL VEIN, PERCUTANEOUS APPROACH: ICD-10-PCS | Performed by: SURGERY

## 2021-09-05 RX ORDER — SODIUM CHLORIDE, SODIUM LACTATE, POTASSIUM CHLORIDE, AND CALCIUM CHLORIDE .6; .31; .03; .02 G/100ML; G/100ML; G/100ML; G/100ML
1000 INJECTION, SOLUTION INTRAVENOUS ONCE
Status: COMPLETED | OUTPATIENT
Start: 2021-09-05 | End: 2021-09-05

## 2021-09-05 RX ORDER — VASOPRESSIN 20 U/ML
INJECTION PARENTERAL PRN
Status: DISCONTINUED | OUTPATIENT
Start: 2021-09-05 | End: 2021-09-05 | Stop reason: SURG

## 2021-09-05 RX ORDER — ROCURONIUM BROMIDE 10 MG/ML
INJECTION, SOLUTION INTRAVENOUS PRN
Status: DISCONTINUED | OUTPATIENT
Start: 2021-09-05 | End: 2021-09-05 | Stop reason: SURG

## 2021-09-05 RX ORDER — MAGNESIUM HYDROXIDE 1200 MG/15ML
LIQUID ORAL
Status: COMPLETED | OUTPATIENT
Start: 2021-09-05 | End: 2021-09-05

## 2021-09-05 RX ORDER — MIDAZOLAM HYDROCHLORIDE 1 MG/ML
INJECTION INTRAMUSCULAR; INTRAVENOUS PRN
Status: DISCONTINUED | OUTPATIENT
Start: 2021-09-05 | End: 2021-09-05 | Stop reason: SURG

## 2021-09-05 RX ORDER — SODIUM CHLORIDE, SODIUM GLUCONATE, SODIUM ACETATE, POTASSIUM CHLORIDE AND MAGNESIUM CHLORIDE 526; 502; 368; 37; 30 MG/100ML; MG/100ML; MG/100ML; MG/100ML; MG/100ML
INJECTION, SOLUTION INTRAVENOUS
Status: DISCONTINUED | OUTPATIENT
Start: 2021-09-05 | End: 2021-09-05 | Stop reason: SURG

## 2021-09-05 RX ORDER — HYDROMORPHONE HYDROCHLORIDE 2 MG/ML
INJECTION, SOLUTION INTRAMUSCULAR; INTRAVENOUS; SUBCUTANEOUS PRN
Status: DISCONTINUED | OUTPATIENT
Start: 2021-09-05 | End: 2021-09-05 | Stop reason: SURG

## 2021-09-05 RX ORDER — CALCIUM CHLORIDE 100 MG/ML
1 INJECTION INTRAVENOUS; INTRAVENTRICULAR ONCE
Status: DISCONTINUED | OUTPATIENT
Start: 2021-09-05 | End: 2021-09-05

## 2021-09-05 RX ORDER — CEFOTETAN DISODIUM 2 G/20ML
INJECTION, POWDER, FOR SOLUTION INTRAMUSCULAR; INTRAVENOUS PRN
Status: DISCONTINUED | OUTPATIENT
Start: 2021-09-05 | End: 2021-09-05 | Stop reason: SURG

## 2021-09-05 RX ORDER — PHENYLEPHRINE HCL IN 0.9% NACL 0.5 MG/5ML
SYRINGE (ML) INTRAVENOUS PRN
Status: DISCONTINUED | OUTPATIENT
Start: 2021-09-05 | End: 2021-09-05 | Stop reason: SURG

## 2021-09-05 RX ORDER — SODIUM CHLORIDE, SODIUM LACTATE, POTASSIUM CHLORIDE, CALCIUM CHLORIDE 600; 310; 30; 20 MG/100ML; MG/100ML; MG/100ML; MG/100ML
INJECTION, SOLUTION INTRAVENOUS
Status: DISCONTINUED | OUTPATIENT
Start: 2021-09-05 | End: 2021-09-05 | Stop reason: SURG

## 2021-09-05 RX ADMIN — SODIUM CHLORIDE, SODIUM GLUCONATE, SODIUM ACETATE, POTASSIUM CHLORIDE AND MAGNESIUM CHLORIDE: 526; 502; 368; 37; 30 INJECTION, SOLUTION INTRAVENOUS at 11:49

## 2021-09-05 RX ADMIN — HYDROMORPHONE HYDROCHLORIDE 0.5 MG: 1 INJECTION, SOLUTION INTRAMUSCULAR; INTRAVENOUS; SUBCUTANEOUS at 16:53

## 2021-09-05 RX ADMIN — HYDROMORPHONE HYDROCHLORIDE 0.5 MG: 1 INJECTION, SOLUTION INTRAMUSCULAR; INTRAVENOUS; SUBCUTANEOUS at 19:27

## 2021-09-05 RX ADMIN — PROPOFOL 20 MCG/KG/MIN: 10 INJECTION, EMULSION INTRAVENOUS at 14:50

## 2021-09-05 RX ADMIN — MIDAZOLAM HYDROCHLORIDE 2 MG: 1 INJECTION, SOLUTION INTRAMUSCULAR; INTRAVENOUS at 11:52

## 2021-09-05 RX ADMIN — CEFOTETAN DISODIUM 2 G: 2 INJECTION, POWDER, FOR SOLUTION INTRAMUSCULAR; INTRAVENOUS at 10:50

## 2021-09-05 RX ADMIN — MIDAZOLAM HYDROCHLORIDE 5 MG: 1 INJECTION, SOLUTION INTRAMUSCULAR; INTRAVENOUS at 07:53

## 2021-09-05 RX ADMIN — Medication 200 MCG: at 11:31

## 2021-09-05 RX ADMIN — VASOPRESSIN 1 UNITS: 20 INJECTION INTRAVENOUS at 11:56

## 2021-09-05 RX ADMIN — HYDROMORPHONE HYDROCHLORIDE 2 MG: 2 INJECTION, SOLUTION INTRAMUSCULAR; INTRAVENOUS; SUBCUTANEOUS at 10:54

## 2021-09-05 RX ADMIN — MIDAZOLAM HYDROCHLORIDE 2 MG: 1 INJECTION, SOLUTION INTRAMUSCULAR; INTRAVENOUS at 01:29

## 2021-09-05 RX ADMIN — SODIUM CHLORIDE, POTASSIUM CHLORIDE, SODIUM LACTATE AND CALCIUM CHLORIDE 1000 ML: 600; 310; 30; 20 INJECTION, SOLUTION INTRAVENOUS at 05:02

## 2021-09-05 RX ADMIN — Medication 200 MCG: at 11:24

## 2021-09-05 RX ADMIN — PROPOFOL 70 MCG/KG/MIN: 10 INJECTION, EMULSION INTRAVENOUS at 07:50

## 2021-09-05 RX ADMIN — CALCIUM CHLORIDE 1000 MG: 100 INJECTION, SOLUTION INTRAVENOUS at 19:17

## 2021-09-05 RX ADMIN — PIPERACILLIN AND TAZOBACTAM 3.38 G: 3; .375 INJECTION, POWDER, LYOPHILIZED, FOR SOLUTION INTRAVENOUS; PARENTERAL at 05:12

## 2021-09-05 RX ADMIN — HYDROMORPHONE HYDROCHLORIDE 0.5 MG: 1 INJECTION, SOLUTION INTRAMUSCULAR; INTRAVENOUS; SUBCUTANEOUS at 03:35

## 2021-09-05 RX ADMIN — Medication 100 MCG: at 11:22

## 2021-09-05 RX ADMIN — SODIUM CHLORIDE: 9 INJECTION, SOLUTION INTRAVENOUS at 17:22

## 2021-09-05 RX ADMIN — VASOPRESSIN 1 UNITS: 20 INJECTION INTRAVENOUS at 12:07

## 2021-09-05 RX ADMIN — Medication 200 MCG: at 11:27

## 2021-09-05 RX ADMIN — Medication 100 MCG: at 11:21

## 2021-09-05 RX ADMIN — PIPERACILLIN AND TAZOBACTAM 3.38 G: 3; .375 INJECTION, POWDER, LYOPHILIZED, FOR SOLUTION INTRAVENOUS; PARENTERAL at 13:34

## 2021-09-05 RX ADMIN — PROPOFOL 60 MCG/KG/MIN: 10 INJECTION, EMULSION INTRAVENOUS at 03:37

## 2021-09-05 RX ADMIN — Medication 200 MCG: at 11:29

## 2021-09-05 RX ADMIN — PIPERACILLIN AND TAZOBACTAM 3.38 G: 3; .375 INJECTION, POWDER, LYOPHILIZED, FOR SOLUTION INTRAVENOUS; PARENTERAL at 21:27

## 2021-09-05 RX ADMIN — PROPOFOL 70 MCG/KG/MIN: 10 INJECTION, EMULSION INTRAVENOUS at 23:00

## 2021-09-05 RX ADMIN — HYDROMORPHONE HYDROCHLORIDE 0.5 MG: 1 INJECTION, SOLUTION INTRAMUSCULAR; INTRAVENOUS; SUBCUTANEOUS at 05:49

## 2021-09-05 RX ADMIN — MIDAZOLAM HYDROCHLORIDE 5 MG: 1 INJECTION, SOLUTION INTRAMUSCULAR; INTRAVENOUS at 10:43

## 2021-09-05 RX ADMIN — MIDAZOLAM HYDROCHLORIDE 2 MG: 1 INJECTION, SOLUTION INTRAMUSCULAR; INTRAVENOUS at 04:30

## 2021-09-05 RX ADMIN — NOREPINEPHRINE BITARTRATE 4 MCG/MIN: 1 INJECTION, SOLUTION, CONCENTRATE INTRAVENOUS at 11:21

## 2021-09-05 RX ADMIN — ROCURONIUM BROMIDE 50 MG: 10 INJECTION, SOLUTION INTRAVENOUS at 12:02

## 2021-09-05 RX ADMIN — ROCURONIUM BROMIDE 100 MG: 10 INJECTION, SOLUTION INTRAVENOUS at 10:59

## 2021-09-05 RX ADMIN — SODIUM CHLORIDE, SODIUM GLUCONATE, SODIUM ACETATE, POTASSIUM CHLORIDE AND MAGNESIUM CHLORIDE: 526; 502; 368; 37; 30 INJECTION, SOLUTION INTRAVENOUS at 12:42

## 2021-09-05 RX ADMIN — SODIUM CHLORIDE, POTASSIUM CHLORIDE, SODIUM LACTATE AND CALCIUM CHLORIDE: 600; 310; 30; 20 INJECTION, SOLUTION INTRAVENOUS at 10:50

## 2021-09-05 RX ADMIN — FAMOTIDINE 20 MG: 10 INJECTION INTRAVENOUS at 16:53

## 2021-09-05 RX ADMIN — HYDROMORPHONE HYDROCHLORIDE 0.5 MG: 1 INJECTION, SOLUTION INTRAMUSCULAR; INTRAVENOUS; SUBCUTANEOUS at 13:34

## 2021-09-05 RX ADMIN — FAMOTIDINE 20 MG: 10 INJECTION INTRAVENOUS at 05:12

## 2021-09-05 RX ADMIN — SODIUM CHLORIDE, SODIUM GLUCONATE, SODIUM ACETATE, POTASSIUM CHLORIDE AND MAGNESIUM CHLORIDE: 526; 502; 368; 37; 30 INJECTION, SOLUTION INTRAVENOUS at 12:03

## 2021-09-05 RX ADMIN — SODIUM CHLORIDE, SODIUM GLUCONATE, SODIUM ACETATE, POTASSIUM CHLORIDE AND MAGNESIUM CHLORIDE: 526; 502; 368; 37; 30 INJECTION, SOLUTION INTRAVENOUS at 11:28

## 2021-09-05 RX ADMIN — HYDROMORPHONE HYDROCHLORIDE 0.5 MG: 1 INJECTION, SOLUTION INTRAMUSCULAR; INTRAVENOUS; SUBCUTANEOUS at 23:00

## 2021-09-05 RX ADMIN — MIDAZOLAM HYDROCHLORIDE 2 MG: 1 INJECTION, SOLUTION INTRAMUSCULAR; INTRAVENOUS at 21:50

## 2021-09-05 ASSESSMENT — PAIN DESCRIPTION - PAIN TYPE
TYPE: ACUTE PAIN

## 2021-09-05 NOTE — CARE PLAN
The patient is Watcher - Medium risk of patient condition declining or worsening    Shift Goals  Clinical Goals: Hemodynamic stability, watch abthera output, pain control  Patient Goals: MENA  Family Goals: MENA      Problem: Safety - Medical Restraint  Goal: Remains free of injury from restraints (Restraint for Interference with Medical Device)  Outcome: Progressing  Goal: Free from restraint(s) (Restraint for Interference with Medical Device)  Outcome: Progressing     Problem: Skin Integrity  Goal: Skin integrity is maintained or improved  Outcome: Progressing     Problem: Fall Risk  Goal: Patient will remain free from falls  Outcome: Progressing

## 2021-09-05 NOTE — ANESTHESIA TIME REPORT
Anesthesia Start and Stop Event Times     Date Time Event    9/5/2021 1017 Ready for Procedure     1050 Anesthesia Start     1307 Anesthesia Stop        Responsible Staff  09/05/21    Name Role Begin End    Mani Pelayo M.D. Anesth 1050 1307        Preop Diagnosis (Free Text):  Pre-op Diagnosis     LIVER INJURY, RETAINED LAP SPONGE PACKING        Preop Diagnosis (Codes):    Post op Diagnosis  Open wound of abdomen  liver injury with open abdomen and retained packing    Premium Reason  E. Weekend    Comments:
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
## 2021-09-05 NOTE — PROGRESS NOTES
Patient is hypotensive SBP 60-70's, tachycardic 130's.  Decreased propofol. Increased norepi. Discussed with Dr. Mackey.    Orders given to administer 2 units PRBC and 2 FFP.

## 2021-09-05 NOTE — PROGRESS NOTES
0930:  Dr. Max at bedside to assess patient.  Requesting MRI w/o.      1015:  Patient presented in interdisciplinary trauma rounds.  Plan to return to OR today for possible abdominal closure. Unable to obtain MRI r/t retained metallic foreign objects in digestion track. Dr. Mackey will discuss with Dr. Max.
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
## 2021-09-05 NOTE — PROGRESS NOTES
Pt had U/O of 20 an hour at 430am.   At 5am, pt received 1L LR [facility] Dr[facility] Mackey's orders.  U/O was still 25 an hour post liter of LR. Dr. Mackey called and notified.

## 2021-09-05 NOTE — PROGRESS NOTES
Neurosurgery Progress Note    Subjective:  No events    Exam:  Intubated sedated   Cn intact  MAEW  Not FC    BP  Min: 107/80  Max: 146/93  Pulse  Av.6  Min: 86  Max: 125  Resp  Av.7  Min: 0  Max: 53  Monitored Temp 2  Av.4 °C (99.3 °F)  Min: 36.9 °C (98.4 °F)  Max: 37.8 °C (100 °F)  SpO2  Av %  Min: 91 %  Max: 99 %    No data recorded    Recent Labs     21   WBC 15.8* 15.1* 13.9*   RBC 4.75 4.75 3.98*   HEMOGLOBIN 14.9 14.7 12.4   HEMATOCRIT 42.5 42.6 36.3*   MCV 89.5 89.7 91.2   MCH 31.4 30.9 31.2   MCHC 35.1* 34.5 34.2   RDW 58.8* 61.1* 62.4*   PLATELETCT 143* 138* 128*   MPV 10.6 10.8 11.3     Recent Labs     21   SODIUM 145 144 143   POTASSIUM 4.9 4.5 4.4   CHLORIDE 113* 113* 113*   CO2 22 23 24   GLUCOSE 123* 126* 117*   BUN 15 15 14   CREATININE 0.74 0.63 0.58   CALCIUM 7.5* 7.5* 7.3*     Recent Labs     21  0708   APTT 29.2 33.0   INR 1.10 1.54*     Recent Labs     21  0708   REACTMIN 7.1 7.1   CLOTKINET 2.3* 2.3*   CLOTANGL 64.8 63.0   MAXCLOTS 50.9* 47.2*   YYP97ZHS 0.0 0.0   PRCINADP 3.1 68.4*   PRCINAA 14.8* 32.5*       Intake/Output                       21 - 21 - 21 Total 8615-4281 4533-8929 Total                 Intake    P.O.  --  0 0  --  -- --    P.O. -- 0 0 -- -- --    I.V.  8729.2  1454.4 42990.6  --  -- --    Propofol Volume 235.9 254.4 490.3 -- -- --    Volume (mL) (NS infusion) 2793.3 1200 3993.3 -- -- --    Volume (mL) (electrolyte-A (PLASMALYTE-A) infusion) 5700 -- 5700 -- -- --    Volume (mL) (dextrose 50% (D50W) injection 50 mL) 0 -- 0 -- -- --    Other  --  0 0  --  -- --    Medications (PO/Enteral Liquids) -- 0 0 -- -- --    NG/GT  --  0 0  --  -- --    Intake (mL) (Enteral Tube 21 Orogastric Oral) -- 0 0 -- -- --    IV Piggyback  2540  1000 3540  --  -- --    Volume  (mL) (NS (BOLUS) infusion 500 mL) 500 -- 500 -- -- --    Volume (mL) (lactated ringer BOLUS infusion) 1500 -- 1500 -- -- --    Volume (mL) (lactated ringer BOLUS infusion) -- 1000 1000 -- -- --    Volume (mL) (piperacillin-tazobactam (ZOSYN) 3.375 g in  mL IVPB) 200 -- 200 -- -- --    Volume (mL) (piperacillin-tazobactam (ZOSYN) 3.375 g in  mL IVPB) 100 -- 100 -- -- --    Volume (mL) (calcium GLUConate 2 g in NaCl IVPB premix) 100 -- 100 -- -- --    Volume (mL) (calcium CHLORIDE 1,000 mg in dextrose 5% 100 mL IVPB) 140 -- 140 -- -- --    Total Intake 41407.2 2454.4 61059.6 -- -- --       Output    Urine  1305  430 1735  --  -- --    Urine 200 -- 200 -- -- --    Output (mL) (Urethral Catheter) 0561 903 9772 -- -- --    Drains  2000  1000 3000  --  -- --    Output (mL) (Negative Pressure Wound Therapy 09/04/21 Abdomen Midline;Anterior) 2000 1000 3000 -- -- --    Emesis/NG output  200  0 200  --  -- --    Output (mL) (Enteral Tube 09/04/21 Orogastric Oral) 200 0 200 -- -- --    Chest Tube  723  190 913  --  -- --    Output (mL) (Chest Tube 1 Right) 723 190 913 -- -- --    Blood  2000 -- 2000  --  -- --    Est. Blood Loss 2000 -- 2000 -- -- --    Total Output 6228 1620 7848 -- -- --       Net I/O     5041.2 834.4 5875.6 -- -- --            Intake/Output Summary (Last 24 hours) at 9/5/2021 0932  Last data filed at 9/5/2021 0600  Gross per 24 hour   Intake 55731.23 ml   Output 5788 ml   Net 6110.23 ml            • Respiratory Therapy Consult   Continuous RT   • Pharmacy Consult Request  1 Each PHARMACY TO DOSE   • ondansetron  4 mg Q4HRS PRN   • ondansetron  4 mg Q4HRS PRN   • docusate sodium  100 mg BID   • senna-docusate  1 Tablet Q24HRS PRN   • bisacodyl  10 mg Q24HRS PRN   • fleet  1 Each Once PRN   • NS   Continuous   • propofol  0-80 mcg/kg/min Continuous   • famotidine  20 mg BID    Or   • famotidine  20 mg BID   • acetaminophen  650 mg Q4HRS PRN    Or   • acetaminophen  650 mg Q4HRS PRN   • magnesium  hydroxide  30 mL QDAY PRN   • polyethylene glycol/lytes  1 Packet BID PRN   • norepinephrine (Levophed) infusion  0-30 mcg/min Continuous   • piperacillin-tazobactam  3.375 g Q8HRS   • glucose  16 g Q15 MIN PRN    And   • dextrose 50%  50 mL Q15 MIN PRN   • HYDROmorphone  0.5 mg Q HOUR PRN   • midazolam  1-5 mg Q HOUR PRN       Assessment and Plan:  Hospital day # 2  POD# na  Chemical prophylactic DVT therapy: yes  Start date/time: 9/5/2021    MRI pending if possible C-spine WO prior to 48 hours  Ok for Q4hr neuro checks  Josh JOINER at all times  T/L madison Max  30 mins in pt care

## 2021-09-05 NOTE — OR SURGEON
Immediate Post OP Note    PreOp Diagnosis: liver injury with retained laps , open abdomen       PostOp Diagnosis: capsular tears of liver , nontransmural injury hepatic flexure of colon     Procedure(s):  LAPAROTOMY, EXPLORATORY, REPAIR OF COLON, REPAIR of  LIVER - Wound Class: Contaminated    Surgeon(s):  Hollis Esquivel M.D.    Anesthesiologist/Type of Anesthesia:  Anesthesiologist: Mani Pelayo M.D./General    Surgical Staff:  Circulator: Karley Reed R.N.  Scrub Person: Sidney Casillas    Specimens removed if any:  * No specimens in log *    Estimated Blood Loss: 2200    Findings: same     Complications: 0        9/5/2021 12:29 PM Hollis Esquivel M.D.

## 2021-09-05 NOTE — PROGRESS NOTES
Dr. Monroy at bedside.  Report given to anesthesiologist and OR RN.  Patient transported from [facility] to OR accompanied by OR team and RN.  No family identified for this patient.  Patient is intubated and sedated.  Unable to obtain written consent.

## 2021-09-05 NOTE — ANESTHESIA POSTPROCEDURE EVALUATION
Patient: Theron [age]    Procedure Summary     Date: 09/05/21 Room / Location: [facility] 09 / SURGERY [facility]    Anesthesia Start: 1050 Anesthesia Stop: 1307    Procedure: LAPAROTOMY, EXPLORATORY, REPAIR OF COLON, REPAIR IF LIVER (N/A Abdomen) Diagnosis: (LIVER INJURY, RETAINED LAP SPONGE PACKING)    Surgeons: Hollis Esquivel M.D. Responsible Provider: Mani Pelayo M.D.    Anesthesia Type: general ASA Status: 4          Final Anesthesia Type: general  Last vitals  BP   Blood Pressure: 112/77, Arterial BP: (!) 82/52    Temp   37.3 °C (99.1 °F)    Pulse   (!) 113   Resp   20    SpO2   98 %      Anesthesia Post Evaluation    Patient location during evaluation: ICU  Patient participation: complete - patient cannot participate  Post-procedure mental status: sedated on propofol.  Pain scale: unable to assess.    Airway patency: patent  Anesthetic complications: no  Cardiovascular status: hemodynamically stable  Respiratory status: acceptable and ETT  Hydration status: euvolemic    Anesthesia PONV: unable to assess.          No complications documented.

## 2021-09-05 NOTE — CARE PLAN
The patient is Watcher - Medium risk of patient condition declining or worsening    Shift Goals  Clinical Goals: (P) SBP , stable Hgb  Patient Goals: (P) unable to assess- intubated and sedated  Family Goals: (P) no family    Progress made toward(s) clinical / shift goals:  BP stable.  Trend CBC and CMP q 6 hours.     Patient is not progressing towards the following goals:

## 2021-09-05 NOTE — ASSESSMENT & PLAN NOTE
Admission imaging shows multiple radiopaque foreign bodies within the bowel consistent with metallic washers.  Expect normal passage of foreign bodies with resolution of ileus and return of GI function.  MRI contraindicated.  9/16 Foreign body remains in cecum on repeat CT imaging.  9/24 Repeat KUB with metallic foreign body/bodies projects in the right hemipelvis.  9/27 Small bowel with follow through.   9/29 Right lower quadrant foreign bodies likely within the cecum and have remains in roughly same area since previous xray.  - Daily dulcolax suppositories.   10/1 Colonoscopy retrieved foreign bodies  10/2 ABD xray shows 1 foreign body remains in pelvis

## 2021-09-06 ENCOUNTER — APPOINTMENT (OUTPATIENT)
Dept: RADIOLOGY | Facility: MEDICAL CENTER | Age: 36
DRG: 957 | End: 2021-09-06
Attending: SPECIALIST
Payer: MEDICAID

## 2021-09-06 PROBLEM — F99 PSYCHIATRIC DISORDER: Status: ACTIVE | Noted: 2021-09-06

## 2021-09-06 LAB
ALBUMIN SERPL BCP-MCNC: 2 G/DL (ref 3.2–4.9)
ALBUMIN SERPL BCP-MCNC: 2 G/DL (ref 3.2–4.9)
ALBUMIN SERPL BCP-MCNC: 2.1 G/DL (ref 3.2–4.9)
ALBUMIN SERPL BCP-MCNC: 2.1 G/DL (ref 3.2–4.9)
ALBUMIN/GLOB SERPL: 0.8 G/DL
ALBUMIN/GLOB SERPL: 0.9 G/DL
ALP SERPL-CCNC: 73 U/L (ref 30–99)
ALP SERPL-CCNC: 76 U/L (ref 30–99)
ALP SERPL-CCNC: 78 U/L (ref 30–99)
ALP SERPL-CCNC: 81 U/L (ref 30–99)
ALT SERPL-CCNC: 116 U/L (ref 2–50)
ALT SERPL-CCNC: 134 U/L (ref 2–50)
ALT SERPL-CCNC: 155 U/L (ref 2–50)
ALT SERPL-CCNC: 164 U/L (ref 2–50)
ANION GAP SERPL CALC-SCNC: 7 MMOL/L (ref 7–16)
ANION GAP SERPL CALC-SCNC: 8 MMOL/L (ref 7–16)
ANION GAP SERPL CALC-SCNC: 8 MMOL/L (ref 7–16)
ANION GAP SERPL CALC-SCNC: 9 MMOL/L (ref 7–16)
ANISOCYTOSIS BLD QL SMEAR: ABNORMAL
ANISOCYTOSIS BLD QL SMEAR: ABNORMAL
AST SERPL-CCNC: 127 U/L (ref 12–45)
AST SERPL-CCNC: 152 U/L (ref 12–45)
AST SERPL-CCNC: 184 U/L (ref 12–45)
AST SERPL-CCNC: 244 U/L (ref 12–45)
BASOPHILS # BLD AUTO: 0 % (ref 0–1.8)
BASOPHILS # BLD AUTO: 0.2 % (ref 0–1.8)
BASOPHILS # BLD AUTO: 0.3 % (ref 0–1.8)
BASOPHILS # BLD AUTO: 0.9 % (ref 0–1.8)
BASOPHILS # BLD: 0 K/UL (ref 0–0.12)
BASOPHILS # BLD: 0.01 K/UL (ref 0–0.12)
BASOPHILS # BLD: 0.02 K/UL (ref 0–0.12)
BASOPHILS # BLD: 0.07 K/UL (ref 0–0.12)
BILIRUB SERPL-MCNC: 0.3 MG/DL (ref 0.1–1.5)
BILIRUB SERPL-MCNC: 0.4 MG/DL (ref 0.1–1.5)
BUN SERPL-MCNC: 13 MG/DL (ref 8–22)
BUN SERPL-MCNC: 14 MG/DL (ref 8–22)
BUN SERPL-MCNC: 15 MG/DL (ref 8–22)
BUN SERPL-MCNC: 15 MG/DL (ref 8–22)
BURR CELLS BLD QL SMEAR: NORMAL
BURR CELLS BLD QL SMEAR: NORMAL
CALCIUM SERPL-MCNC: 7.5 MG/DL (ref 8.5–10.5)
CALCIUM SERPL-MCNC: 7.5 MG/DL (ref 8.5–10.5)
CALCIUM SERPL-MCNC: 7.6 MG/DL (ref 8.5–10.5)
CALCIUM SERPL-MCNC: 7.6 MG/DL (ref 8.5–10.5)
CHLORIDE SERPL-SCNC: 109 MMOL/L (ref 96–112)
CHLORIDE SERPL-SCNC: 109 MMOL/L (ref 96–112)
CHLORIDE SERPL-SCNC: 110 MMOL/L (ref 96–112)
CHLORIDE SERPL-SCNC: 111 MMOL/L (ref 96–112)
CO2 SERPL-SCNC: 22 MMOL/L (ref 20–33)
CREAT SERPL-MCNC: 0.44 MG/DL (ref 0.5–1.4)
CREAT SERPL-MCNC: 0.53 MG/DL (ref 0.5–1.4)
CREAT SERPL-MCNC: 0.61 MG/DL (ref 0.5–1.4)
CREAT SERPL-MCNC: 0.68 MG/DL (ref 0.5–1.4)
EOSINOPHIL # BLD AUTO: 0.03 K/UL (ref 0–0.51)
EOSINOPHIL # BLD AUTO: 0.05 K/UL (ref 0–0.51)
EOSINOPHIL # BLD AUTO: 0.07 K/UL (ref 0–0.51)
EOSINOPHIL # BLD AUTO: 0.09 K/UL (ref 0–0.51)
EOSINOPHIL NFR BLD: 0.5 % (ref 0–6.9)
EOSINOPHIL NFR BLD: 0.7 % (ref 0–6.9)
EOSINOPHIL NFR BLD: 0.9 % (ref 0–6.9)
EOSINOPHIL NFR BLD: 0.9 % (ref 0–6.9)
ERYTHROCYTE [DISTWIDTH] IN BLOOD BY AUTOMATED COUNT: 55.6 FL (ref 35.9–50)
ERYTHROCYTE [DISTWIDTH] IN BLOOD BY AUTOMATED COUNT: 55.8 FL (ref 35.9–50)
ERYTHROCYTE [DISTWIDTH] IN BLOOD BY AUTOMATED COUNT: 55.9 FL (ref 35.9–50)
ERYTHROCYTE [DISTWIDTH] IN BLOOD BY AUTOMATED COUNT: 57.2 FL (ref 35.9–50)
GIANT PLATELETS BLD QL SMEAR: NORMAL
GLOBULIN SER CALC-MCNC: 2.3 G/DL (ref 1.9–3.5)
GLOBULIN SER CALC-MCNC: 2.4 G/DL (ref 1.9–3.5)
GLUCOSE SERPL-MCNC: 100 MG/DL (ref 65–99)
GLUCOSE SERPL-MCNC: 106 MG/DL (ref 65–99)
GLUCOSE SERPL-MCNC: 115 MG/DL (ref 65–99)
GLUCOSE SERPL-MCNC: 130 MG/DL (ref 65–99)
HCT VFR BLD AUTO: 27.6 % (ref 37–47)
HCT VFR BLD AUTO: 29.8 % (ref 37–47)
HCT VFR BLD AUTO: 33.1 % (ref 37–47)
HCT VFR BLD AUTO: 34.3 % (ref 37–47)
HGB BLD-MCNC: 10.2 G/DL (ref 12–16)
HGB BLD-MCNC: 11.4 G/DL (ref 12–16)
HGB BLD-MCNC: 11.6 G/DL (ref 12–16)
HGB BLD-MCNC: 9.3 G/DL (ref 12–16)
IMM GRANULOCYTES # BLD AUTO: 0.03 K/UL (ref 0–0.11)
IMM GRANULOCYTES # BLD AUTO: 0.03 K/UL (ref 0–0.11)
IMM GRANULOCYTES NFR BLD AUTO: 0.4 % (ref 0–0.9)
IMM GRANULOCYTES NFR BLD AUTO: 0.5 % (ref 0–0.9)
LYMPHOCYTES # BLD AUTO: 0.28 K/UL (ref 1–4.8)
LYMPHOCYTES # BLD AUTO: 0.62 K/UL (ref 1–4.8)
LYMPHOCYTES # BLD AUTO: 0.73 K/UL (ref 1–4.8)
LYMPHOCYTES # BLD AUTO: 1.01 K/UL (ref 1–4.8)
LYMPHOCYTES NFR BLD: 10 % (ref 22–41)
LYMPHOCYTES NFR BLD: 10.4 % (ref 22–41)
LYMPHOCYTES NFR BLD: 3.5 % (ref 22–41)
LYMPHOCYTES NFR BLD: 9.9 % (ref 22–41)
MACROCYTES BLD QL SMEAR: ABNORMAL
MANUAL DIFF BLD: ABNORMAL
MANUAL DIFF BLD: NORMAL
MCH RBC QN AUTO: 30.2 PG (ref 27–33)
MCH RBC QN AUTO: 30.4 PG (ref 27–33)
MCH RBC QN AUTO: 30.5 PG (ref 27–33)
MCH RBC QN AUTO: 31.1 PG (ref 27–33)
MCHC RBC AUTO-ENTMCNC: 33.7 G/DL (ref 33.6–35)
MCHC RBC AUTO-ENTMCNC: 33.8 G/DL (ref 33.6–35)
MCHC RBC AUTO-ENTMCNC: 34.2 G/DL (ref 33.6–35)
MCHC RBC AUTO-ENTMCNC: 34.4 G/DL (ref 33.6–35)
MCV RBC AUTO: 89 FL (ref 81.4–97.8)
MCV RBC AUTO: 89.6 FL (ref 81.4–97.8)
MCV RBC AUTO: 90.2 FL (ref 81.4–97.8)
MCV RBC AUTO: 90.3 FL (ref 81.4–97.8)
MICROCYTES BLD QL SMEAR: ABNORMAL
MONOCYTES # BLD AUTO: 0.14 K/UL (ref 0–0.85)
MONOCYTES # BLD AUTO: 0.25 K/UL (ref 0–0.85)
MONOCYTES # BLD AUTO: 0.34 K/UL (ref 0–0.85)
MONOCYTES # BLD AUTO: 0.44 K/UL (ref 0–0.85)
MONOCYTES NFR BLD AUTO: 1.7 % (ref 0–13.4)
MONOCYTES NFR BLD AUTO: 2.6 % (ref 0–13.4)
MONOCYTES NFR BLD AUTO: 5.4 % (ref 0–13.4)
MONOCYTES NFR BLD AUTO: 6 % (ref 0–13.4)
MORPHOLOGY BLD-IMP: NORMAL
MORPHOLOGY BLD-IMP: NORMAL
NEUTROPHILS # BLD AUTO: 5.25 K/UL (ref 2–7.15)
NEUTROPHILS # BLD AUTO: 6.05 K/UL (ref 2–7.15)
NEUTROPHILS # BLD AUTO: 7.53 K/UL (ref 2–7.15)
NEUTROPHILS # BLD AUTO: 8.35 K/UL (ref 2–7.15)
NEUTROPHILS NFR BLD: 74.8 % (ref 44–72)
NEUTROPHILS NFR BLD: 82.6 % (ref 44–72)
NEUTROPHILS NFR BLD: 83.5 % (ref 44–72)
NEUTROPHILS NFR BLD: 93 % (ref 44–72)
NEUTS BAND NFR BLD MANUAL: 11.3 % (ref 0–10)
NRBC # BLD AUTO: 0 K/UL
NRBC BLD-RTO: 0 /100 WBC
OVALOCYTES BLD QL SMEAR: NORMAL
PLATELET # BLD AUTO: 124 K/UL (ref 164–446)
PLATELET # BLD AUTO: 128 K/UL (ref 164–446)
PLATELET # BLD AUTO: 136 K/UL (ref 164–446)
PLATELET # BLD AUTO: 137 K/UL (ref 164–446)
PLATELET BLD QL SMEAR: NORMAL
PLATELET BLD QL SMEAR: NORMAL
PMV BLD AUTO: 10.2 FL (ref 9–12.9)
PMV BLD AUTO: 10.4 FL (ref 9–12.9)
PMV BLD AUTO: 10.5 FL (ref 9–12.9)
PMV BLD AUTO: 11 FL (ref 9–12.9)
POIKILOCYTOSIS BLD QL SMEAR: NORMAL
POIKILOCYTOSIS BLD QL SMEAR: NORMAL
POLYCHROMASIA BLD QL SMEAR: NORMAL
POLYCHROMASIA BLD QL SMEAR: NORMAL
POTASSIUM SERPL-SCNC: 3.8 MMOL/L (ref 3.6–5.5)
POTASSIUM SERPL-SCNC: 4.3 MMOL/L (ref 3.6–5.5)
PROT SERPL-MCNC: 4.3 G/DL (ref 6–8.2)
PROT SERPL-MCNC: 4.4 G/DL (ref 6–8.2)
RBC # BLD AUTO: 3.08 M/UL (ref 4.2–5.4)
RBC # BLD AUTO: 3.35 M/UL (ref 4.2–5.4)
RBC # BLD AUTO: 3.67 M/UL (ref 4.2–5.4)
RBC # BLD AUTO: 3.8 M/UL (ref 4.2–5.4)
RBC BLD AUTO: PRESENT
RBC BLD AUTO: PRESENT
SMUDGE CELLS BLD QL SMEAR: NORMAL
SODIUM SERPL-SCNC: 139 MMOL/L (ref 135–145)
SODIUM SERPL-SCNC: 140 MMOL/L (ref 135–145)
TRIGL SERPL-MCNC: 196 MG/DL (ref 0–149)
WBC # BLD AUTO: 6.3 K/UL (ref 4.8–10.8)
WBC # BLD AUTO: 7.3 K/UL (ref 4.8–10.8)
WBC # BLD AUTO: 8.1 K/UL (ref 4.8–10.8)
WBC # BLD AUTO: 9.7 K/UL (ref 4.8–10.8)

## 2021-09-06 PROCEDURE — 700111 HCHG RX REV CODE 636 W/ 250 OVERRIDE (IP): Performed by: SPECIALIST

## 2021-09-06 PROCEDURE — 94003 VENT MGMT INPAT SUBQ DAY: CPT

## 2021-09-06 PROCEDURE — 85025 COMPLETE CBC W/AUTO DIFF WBC: CPT | Mod: 91

## 2021-09-06 PROCEDURE — 700102 HCHG RX REV CODE 250 W/ 637 OVERRIDE(OP): Performed by: SURGERY

## 2021-09-06 PROCEDURE — 700102 HCHG RX REV CODE 250 W/ 637 OVERRIDE(OP): Performed by: SPECIALIST

## 2021-09-06 PROCEDURE — A9270 NON-COVERED ITEM OR SERVICE: HCPCS | Performed by: SURGERY

## 2021-09-06 PROCEDURE — 700105 HCHG RX REV CODE 258: Performed by: SURGERY

## 2021-09-06 PROCEDURE — 85027 COMPLETE CBC AUTOMATED: CPT

## 2021-09-06 PROCEDURE — 84478 ASSAY OF TRIGLYCERIDES: CPT

## 2021-09-06 PROCEDURE — 80053 COMPREHEN METABOLIC PANEL: CPT | Mod: 91

## 2021-09-06 PROCEDURE — 700105 HCHG RX REV CODE 258: Performed by: SPECIALIST

## 2021-09-06 PROCEDURE — 700111 HCHG RX REV CODE 636 W/ 250 OVERRIDE (IP): Performed by: SURGERY

## 2021-09-06 PROCEDURE — 71045 X-RAY EXAM CHEST 1 VIEW: CPT

## 2021-09-06 PROCEDURE — 99291 CRITICAL CARE FIRST HOUR: CPT | Performed by: SURGERY

## 2021-09-06 PROCEDURE — A9270 NON-COVERED ITEM OR SERVICE: HCPCS | Performed by: SPECIALIST

## 2021-09-06 PROCEDURE — 770022 HCHG ROOM/CARE - ICU (200)

## 2021-09-06 PROCEDURE — 94150 VITAL CAPACITY TEST: CPT

## 2021-09-06 PROCEDURE — 99292 CRITICAL CARE ADDL 30 MIN: CPT | Performed by: SURGERY

## 2021-09-06 PROCEDURE — 85007 BL SMEAR W/DIFF WBC COUNT: CPT

## 2021-09-06 RX ORDER — QUETIAPINE FUMARATE 100 MG/1
100 TABLET, FILM COATED ORAL 2 TIMES DAILY
Status: DISCONTINUED | OUTPATIENT
Start: 2021-09-06 | End: 2021-09-12

## 2021-09-06 RX ORDER — HALOPERIDOL 5 MG/ML
5 INJECTION INTRAMUSCULAR EVERY 4 HOURS PRN
Status: DISCONTINUED | OUTPATIENT
Start: 2021-09-06 | End: 2021-09-25

## 2021-09-06 RX ORDER — SODIUM CHLORIDE 9 MG/ML
500 INJECTION, SOLUTION INTRAVENOUS ONCE
Status: COMPLETED | OUTPATIENT
Start: 2021-09-06 | End: 2021-09-06

## 2021-09-06 RX ADMIN — HALOPERIDOL LACTATE 5 MG: 5 INJECTION, SOLUTION INTRAMUSCULAR at 23:35

## 2021-09-06 RX ADMIN — PIPERACILLIN AND TAZOBACTAM 3.38 G: 3; .375 INJECTION, POWDER, LYOPHILIZED, FOR SOLUTION INTRAVENOUS; PARENTERAL at 22:08

## 2021-09-06 RX ADMIN — QUETIAPINE FUMARATE 100 MG: 100 TABLET ORAL at 11:36

## 2021-09-06 RX ADMIN — PIPERACILLIN AND TAZOBACTAM 3.38 G: 3; .375 INJECTION, POWDER, LYOPHILIZED, FOR SOLUTION INTRAVENOUS; PARENTERAL at 12:41

## 2021-09-06 RX ADMIN — HALOPERIDOL LACTATE 5 MG: 5 INJECTION, SOLUTION INTRAMUSCULAR at 12:47

## 2021-09-06 RX ADMIN — HALOPERIDOL LACTATE 5 MG: 5 INJECTION, SOLUTION INTRAMUSCULAR at 16:51

## 2021-09-06 RX ADMIN — PROPOFOL 70 MCG/KG/MIN: 10 INJECTION, EMULSION INTRAVENOUS at 02:20

## 2021-09-06 RX ADMIN — PIPERACILLIN AND TAZOBACTAM 3.38 G: 3; .375 INJECTION, POWDER, LYOPHILIZED, FOR SOLUTION INTRAVENOUS; PARENTERAL at 05:21

## 2021-09-06 RX ADMIN — QUETIAPINE FUMARATE 100 MG: 100 TABLET ORAL at 16:31

## 2021-09-06 RX ADMIN — HYDROMORPHONE HYDROCHLORIDE 0.5 MG: 1 INJECTION, SOLUTION INTRAMUSCULAR; INTRAVENOUS; SUBCUTANEOUS at 05:21

## 2021-09-06 RX ADMIN — HYDROMORPHONE HYDROCHLORIDE 0.5 MG: 1 INJECTION, SOLUTION INTRAMUSCULAR; INTRAVENOUS; SUBCUTANEOUS at 07:18

## 2021-09-06 RX ADMIN — HYDROMORPHONE HYDROCHLORIDE 0.5 MG: 1 INJECTION, SOLUTION INTRAMUSCULAR; INTRAVENOUS; SUBCUTANEOUS at 02:37

## 2021-09-06 RX ADMIN — DOCUSATE SODIUM 100 MG: 100 CAPSULE ORAL at 16:31

## 2021-09-06 RX ADMIN — FAMOTIDINE 20 MG: 10 INJECTION INTRAVENOUS at 05:22

## 2021-09-06 RX ADMIN — SODIUM CHLORIDE 500 ML: 9 INJECTION, SOLUTION INTRAVENOUS at 06:09

## 2021-09-06 RX ADMIN — PROPOFOL 70 MCG/KG/MIN: 10 INJECTION, EMULSION INTRAVENOUS at 06:03

## 2021-09-06 RX ADMIN — MIDAZOLAM HYDROCHLORIDE 5 MG: 1 INJECTION, SOLUTION INTRAMUSCULAR; INTRAVENOUS at 14:57

## 2021-09-06 RX ADMIN — FAMOTIDINE 20 MG: 20 TABLET ORAL at 16:31

## 2021-09-06 RX ADMIN — HYDROMORPHONE HYDROCHLORIDE 0.5 MG: 1 INJECTION, SOLUTION INTRAMUSCULAR; INTRAVENOUS; SUBCUTANEOUS at 22:30

## 2021-09-06 ASSESSMENT — PAIN DESCRIPTION - PAIN TYPE
TYPE: ACUTE PAIN

## 2021-09-06 ASSESSMENT — PULMONARY FUNCTION TESTS: FVC: .6

## 2021-09-06 NOTE — PROGRESS NOTES
Neurosurgery Progress Note    Subjective:  No events    Exam:  Intubated sedated   Cn intact  MAEW  Not FC    BP  Min: 73/50  Max: 149/90  Pulse  Av.1  Min: 98  Max: 131  Resp  Av.5  Min: 11  Max: 33  Temp  Av.2 °C (98.9 °F)  Min: 37.1 °C (98.7 °F)  Max: 37.3 °C (99.1 °F)  Monitored Temp 2  Av.3 °C (99.1 °F)  Min: 36.9 °C (98.4 °F)  Max: 37.7 °C (99.9 °F)  SpO2  Av.3 %  Min: 93 %  Max: 100 %    No data recorded    Recent Labs     21  0529   WBC 11.2* 9.7 8.1   RBC 4.51 3.80* 3.67*   HEMOGLOBIN 13.7 11.6* 11.4*   HEMATOCRIT 40.0 34.3* 33.1*   MCV 88.7 90.3 90.2   MCH 30.4 30.5 31.1   MCHC 34.3 33.8 34.4   RDW 53.3* 55.9* 57.2*   PLATELETCT 88* 128* 136*   MPV 11.2 11.0 10.5     Recent Labs     21  0000 21  0529   SODIUM 140 140 140   POTASSIUM 4.6 4.3 4.3   CHLORIDE 110 111 110   CO2 21 22 22   GLUCOSE 135* 130* 115*   BUN 14 15 15   CREATININE 0.70 0.68 0.61   CALCIUM 6.8* 7.6* 7.6*     Recent Labs     21  0708   APTT 29.2 33.0   INR 1.10 1.54*     Recent Labs     21  0708 21  1240   REACTMIN 7.1 7.1 4.3*   CLOTKINET 2.3* 2.3* 1.9   CLOTANGL 64.8 63.0 71.3   MAXCLOTS 50.9* 47.2* 50.6*   EGY39RFB 0.0 0.0 0.0   PRCINADP 3.1 68.4*  --    PRCINAA 14.8* 32.5*  --        Intake/Output                       21 - 21 0659 21 - 21 Total  Total                 Intake    P.O.  --  0 0  --  -- --    P.O. -- 0 0 -- -- --    I.V.  4751.6  1449.9 6201.5  249.6  -- 249.6    Propofol Volume 139.2 249.9 389 49.6 -- 49.6    Norepinephrine Volume 77.4 0 77.4 -- -- --    Volume (mL) (NS infusion) 535 1200 1735 200 -- 200    Volume (mL) (Lactated Ringers) 1000 -- 1000 -- -- --    Volume (mL) (electrolyte-A (PLASMALYTE-A) infusion) 3000 -- 3000 -- -- --    Blood  1527.7  224 1751.7  --  -- --    Volume (RELEASE RED BLOOD  CELLS) 250 -- 250 -- -- --    Volume (RELEASE RED BLOOD CELLS) 250 -- 250 -- -- --    Volume (RELEASE FRESH FROZEN PLASMA (UNITS)) 201 -- 201 -- -- --    Volume (RELEASE RED BLOOD CELLS) 349.7 -- 349.7 -- -- --    Volume (RELEASE RED BLOOD CELLS) 280 -- 280 -- -- --    Volume (RELEASE FRESH FROZEN PLASMA (UNITS)) 197 -- 197 -- -- --    Volume (RELEASE PLATELET PHERESIS) -- 224 224 -- -- --    Other  --  0 0  --  -- --    Medications (PO/Enteral Liquids) -- 0 0 -- -- --    NG/GT  20  0 20  --  -- --    Intake (mL) (Enteral Tube 09/04/21 Orogastric Oral) 20 0 20 -- -- --    IV Piggyback  --  300 300  500  -- 500    Volume (mL) (NS (BOLUS) infusion 500 mL) -- -- -- 500 -- 500    Volume (mL) (calcium CHLORIDE 1,000 mg in dextrose 5% 100 mL IVPB) -- 300 300 -- -- --    Total Intake 6299.3 1973.9 8273.1 749.6 -- 749.6       Output    Urine  380  380 760  --  -- --    Urine 75 -- 75 -- -- --    Output (mL) (Urethral Catheter) 305 380 685 -- -- --    Drains  285  330 615  --  -- --    Output (mL) (Closed/Suction Drain 1 Right Abdomen Nader Barros 19 Fr.) 285 330 615 -- -- --    Emesis/NG output  --  0 0  --  -- --    Output (mL) (Enteral Tube 09/04/21 Orogastric Oral) -- 0 0 -- -- --    Chest Tube  345  130 475  --  -- --    Output (mL) (Chest Tube 1 Right) 345 130 475 -- -- --    Blood  1000  -- 1000  --  -- --    Est. Blood Loss 1000 -- 1000 -- -- --    Total Output 2010 840 2850 -- -- --       Net I/O     4289.3 1133.9 5423.1 749.6 -- 749.6            Intake/Output Summary (Last 24 hours) at 9/6/2021 0757  Last data filed at 9/6/2021 0600  Gross per 24 hour   Intake 8273.11 ml   Output 2850 ml   Net 5423.11 ml            • Respiratory Therapy Consult   Continuous RT   • Pharmacy Consult Request  1 Each PHARMACY TO DOSE   • ondansetron  4 mg Q4HRS PRN   • ondansetron  4 mg Q4HRS PRN   • docusate sodium  100 mg BID   • senna-docusate  1 Tablet Q24HRS PRN   • bisacodyl  10 mg Q24HRS PRN   • fleet  1 Each Once PRN   • NS    Continuous   • propofol  0-80 mcg/kg/min Continuous   • famotidine  20 mg BID    Or   • famotidine  20 mg BID   • acetaminophen  650 mg Q4HRS PRN    Or   • acetaminophen  650 mg Q4HRS PRN   • magnesium hydroxide  30 mL QDAY PRN   • polyethylene glycol/lytes  1 Packet BID PRN   • norepinephrine (Levophed) infusion  0-30 mcg/min Continuous   • piperacillin-tazobactam  3.375 g Q8HRS   • glucose  16 g Q15 MIN PRN    And   • dextrose 50%  50 mL Q15 MIN PRN   • HYDROmorphone  0.5 mg Q HOUR PRN   • midazolam  1-5 mg Q HOUR PRN       Assessment and Plan:  Hospital day # 3  POD# na  Chemical prophylactic DVT therapy: yes  Start date/time: 9/5/2021    MRI pending if possible C-spine WO prior to 48 hours  Ok for Q4hr neuro checks  Josh JOINER at all times  T/L madison Max  30 mins in pt care

## 2021-09-06 NOTE — CARE PLAN
The patient is Watcher - Medium risk of patient condition declining or worsening    Shift Goals  Clinical Goals: calm, doesn't remove ETT or medical devices, OR today  Patient Goals: unable to assess, sedated  Family Goals:  no family    Progress made toward(s) clinical / shift goals:  Abdomen closed in OR today.  Required additional blood product post op.     Patient is not progressing towards the following goals:      Problem: Hemodynamics  Goal: Patient's hemodynamics, fluid balance and neurologic status will be stable or improve  Outcome: Not Progressing     Problem: Nutrition  Goal: Patient's nutritional and fluid intake will be adequate or improve  Outcome: Not Progressing  Goal: Enteral nutrition will be maintained or improve  Outcome: Not Progressing  Goal: Enteral nutrition will be maintained or improve  Outcome: Not Progressing

## 2021-09-06 NOTE — CARE PLAN
The patient is Watcher - Medium risk of patient condition declining or worsening    Shift Goals  Clinical Goals: Hemodynamically stable, watch for s/sx of bleeding, calm   Patient Goals: MENA  Family Goals: MENA      Problem: Pain - Standard  Goal: Alleviation of pain or a reduction in pain to the patient’s comfort goal  Outcome: Progressing     Problem: Skin Integrity  Goal: Skin integrity is maintained or improved  Outcome: Progressing

## 2021-09-06 NOTE — CARE PLAN
Problem: Ventilation  Goal: Ability to achieve and maintain unassisted ventilation or tolerate decreased levels of ventilator support  Description: Document on Vent flowsheet    1.  Support and monitor invasive and noninvasive mechanical ventilation  2.  Monitor ventilator weaning response  3.  Perform ventilator associated pneumonia prevention interventions  4.  Manage ventilation therapy by monitoring diagnostic test results  Outcome: Progressing    Vent Day 3  7.5 23  20 330 +10 50%
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
## 2021-09-06 NOTE — ASSESSMENT & PLAN NOTE
History of eating metallic objects, inserting inappropriate objects into body cavities, auditory and visual hallucinations.  9/6 Haldol and Seroquel started for agitation.    9/10 Psychiatry evaluation completed. Legal hold not indicated.  9/28 Re-consulted for auditory hallucinations and paranoia--seroquel increased.  9/30 EKG without QTc prolongation  Tele sitter for oxygen compliance.  Raina Villavicencio L.C.S.W. Psychiatry.

## 2021-09-06 NOTE — CARE PLAN
The patient is Watcher - Medium risk of patient condition declining or worsening    Shift Goals  Clinical Goals: Hemodynamically stable, watch for s/sx of bleeding, calm   Patient Goals: MENA  Family Goals: MENA      Problem: Pain - Standard  Goal: Alleviation of pain or a reduction in pain to the patient’s comfort goal  Outcome: Progressing     Problem: Safety - Medical Restraint  Goal: Remains free of injury from restraints (Restraint for Interference with Medical Device)  Outcome: Progressing  Goal: Free from restraint(s) (Restraint for Interference with Medical Device)  Outcome: Progressing     Problem: Skin Integrity  Goal: Skin integrity is maintained or improved  Outcome: Progressing

## 2021-09-07 ENCOUNTER — APPOINTMENT (OUTPATIENT)
Dept: RADIOLOGY | Facility: MEDICAL CENTER | Age: 36
DRG: 957 | End: 2021-09-07
Attending: SPECIALIST
Payer: MEDICAID

## 2021-09-07 ENCOUNTER — APPOINTMENT (OUTPATIENT)
Dept: RADIOLOGY | Facility: MEDICAL CENTER | Age: 36
DRG: 957 | End: 2021-09-07
Attending: SURGERY
Payer: MEDICAID

## 2021-09-07 PROBLEM — E87.70 FLUID OVERLOAD: Status: ACTIVE | Noted: 2021-09-07

## 2021-09-07 LAB
ALBUMIN SERPL BCP-MCNC: 2 G/DL (ref 3.2–4.9)
ALBUMIN SERPL BCP-MCNC: 2.2 G/DL (ref 3.2–4.9)
ALBUMIN SERPL BCP-MCNC: 2.2 G/DL (ref 3.2–4.9)
ALBUMIN/GLOB SERPL: 0.8 G/DL
ALBUMIN/GLOB SERPL: 0.9 G/DL
ALBUMIN/GLOB SERPL: 1 G/DL
ALP SERPL-CCNC: 78 U/L (ref 30–99)
ALP SERPL-CCNC: 78 U/L (ref 30–99)
ALP SERPL-CCNC: 79 U/L (ref 30–99)
ALT SERPL-CCNC: 106 U/L (ref 2–50)
ALT SERPL-CCNC: 109 U/L (ref 2–50)
ALT SERPL-CCNC: 96 U/L (ref 2–50)
ANION GAP SERPL CALC-SCNC: 10 MMOL/L (ref 7–16)
ANION GAP SERPL CALC-SCNC: 9 MMOL/L (ref 7–16)
ANION GAP SERPL CALC-SCNC: 9 MMOL/L (ref 7–16)
AST SERPL-CCNC: 105 U/L (ref 12–45)
AST SERPL-CCNC: 122 U/L (ref 12–45)
AST SERPL-CCNC: 90 U/L (ref 12–45)
BASE EXCESS BLDA CALC-SCNC: -1 MMOL/L (ref -4–3)
BASE EXCESS BLDA CALC-SCNC: -16 MMOL/L (ref -4–3)
BASE EXCESS BLDA CALC-SCNC: -8 MMOL/L (ref -4–3)
BASOPHILS # BLD AUTO: 0.3 % (ref 0–1.8)
BASOPHILS # BLD: 0.02 K/UL (ref 0–0.12)
BILIRUB SERPL-MCNC: 0.3 MG/DL (ref 0.1–1.5)
BILIRUB SERPL-MCNC: 0.3 MG/DL (ref 0.1–1.5)
BILIRUB SERPL-MCNC: 0.4 MG/DL (ref 0.1–1.5)
BODY TEMPERATURE: ABNORMAL DEGREES
BREATHS SETTING VENT: 24
BUN SERPL-MCNC: 10 MG/DL (ref 8–22)
BUN SERPL-MCNC: 11 MG/DL (ref 8–22)
BUN SERPL-MCNC: 9 MG/DL (ref 8–22)
CA-I BLD ISE-SCNC: 0.58 MMOL/L (ref 1.1–1.3)
CA-I BLD ISE-SCNC: 0.71 MMOL/L (ref 1.1–1.3)
CALCIUM SERPL-MCNC: 7.6 MG/DL (ref 8.5–10.5)
CALCIUM SERPL-MCNC: 7.6 MG/DL (ref 8.5–10.5)
CALCIUM SERPL-MCNC: 7.7 MG/DL (ref 8.5–10.5)
CHLORIDE SERPL-SCNC: 109 MMOL/L (ref 96–112)
CHLORIDE SERPL-SCNC: 111 MMOL/L (ref 96–112)
CHLORIDE SERPL-SCNC: 111 MMOL/L (ref 96–112)
CO2 BLDA-SCNC: 16 MMOL/L (ref 20–33)
CO2 BLDA-SCNC: 21 MMOL/L (ref 20–33)
CO2 BLDA-SCNC: 25 MMOL/L (ref 20–33)
CO2 SERPL-SCNC: 20 MMOL/L (ref 20–33)
CO2 SERPL-SCNC: 21 MMOL/L (ref 20–33)
CO2 SERPL-SCNC: 22 MMOL/L (ref 20–33)
CREAT SERPL-MCNC: 0.31 MG/DL (ref 0.5–1.4)
CREAT SERPL-MCNC: 0.34 MG/DL (ref 0.5–1.4)
CREAT SERPL-MCNC: 0.34 MG/DL (ref 0.5–1.4)
DELSYS IDSYS: ABNORMAL
END TIDAL CARBON DIOXIDE IECO2: 33 MMHG
EOSINOPHIL # BLD AUTO: 0.03 K/UL (ref 0–0.51)
EOSINOPHIL # BLD AUTO: 0.03 K/UL (ref 0–0.51)
EOSINOPHIL # BLD AUTO: 0.04 K/UL (ref 0–0.51)
EOSINOPHIL NFR BLD: 0.5 % (ref 0–6.9)
EOSINOPHIL NFR BLD: 0.5 % (ref 0–6.9)
EOSINOPHIL NFR BLD: 0.7 % (ref 0–6.9)
ERYTHROCYTE [DISTWIDTH] IN BLOOD BY AUTOMATED COUNT: 57.1 FL (ref 35.9–50)
ERYTHROCYTE [DISTWIDTH] IN BLOOD BY AUTOMATED COUNT: 57.1 FL (ref 35.9–50)
ERYTHROCYTE [DISTWIDTH] IN BLOOD BY AUTOMATED COUNT: 58 FL (ref 35.9–50)
GLOBULIN SER CALC-MCNC: 2.3 G/DL (ref 1.9–3.5)
GLOBULIN SER CALC-MCNC: 2.4 G/DL (ref 1.9–3.5)
GLOBULIN SER CALC-MCNC: 2.5 G/DL (ref 1.9–3.5)
GLUCOSE SERPL-MCNC: 74 MG/DL (ref 65–99)
GLUCOSE SERPL-MCNC: 77 MG/DL (ref 65–99)
GLUCOSE SERPL-MCNC: 89 MG/DL (ref 65–99)
HCO3 BLDA-SCNC: 14.2 MMOL/L (ref 17–25)
HCO3 BLDA-SCNC: 19.2 MMOL/L (ref 17–25)
HCO3 BLDA-SCNC: 23.8 MMOL/L (ref 17–25)
HCT VFR BLD AUTO: 27.1 % (ref 37–47)
HCT VFR BLD AUTO: 27.2 % (ref 37–47)
HCT VFR BLD AUTO: 27.2 % (ref 37–47)
HCT VFR BLD CALC: 22 % (ref 37–47)
HCT VFR BLD CALC: 24 % (ref 37–47)
HGB BLD-MCNC: 7.5 G/DL (ref 12–16)
HGB BLD-MCNC: 8.2 G/DL (ref 12–16)
HGB BLD-MCNC: 9 G/DL (ref 12–16)
HOROWITZ INDEX BLDA+IHG-RTO: 152 MM[HG]
HOROWITZ INDEX BLDA+IHG-RTO: ABNORMAL MM[HG]
IMM GRANULOCYTES # BLD AUTO: 0.04 K/UL (ref 0–0.11)
IMM GRANULOCYTES # BLD AUTO: 0.04 K/UL (ref 0–0.11)
IMM GRANULOCYTES # BLD AUTO: 0.06 K/UL (ref 0–0.11)
IMM GRANULOCYTES NFR BLD AUTO: 0.6 % (ref 0–0.9)
IMM GRANULOCYTES NFR BLD AUTO: 0.7 % (ref 0–0.9)
IMM GRANULOCYTES NFR BLD AUTO: 0.9 % (ref 0–0.9)
LYMPHOCYTES # BLD AUTO: 0.63 K/UL (ref 1–4.8)
LYMPHOCYTES # BLD AUTO: 0.72 K/UL (ref 1–4.8)
LYMPHOCYTES # BLD AUTO: 0.77 K/UL (ref 1–4.8)
LYMPHOCYTES NFR BLD: 11 % (ref 22–41)
LYMPHOCYTES NFR BLD: 12.6 % (ref 22–41)
LYMPHOCYTES NFR BLD: 9.8 % (ref 22–41)
MCH RBC QN AUTO: 29.9 PG (ref 27–33)
MCH RBC QN AUTO: 30 PG (ref 27–33)
MCH RBC QN AUTO: 30.5 PG (ref 27–33)
MCHC RBC AUTO-ENTMCNC: 33.1 G/DL (ref 33.6–35)
MCHC RBC AUTO-ENTMCNC: 33.1 G/DL (ref 33.6–35)
MCHC RBC AUTO-ENTMCNC: 33.2 G/DL (ref 33.6–35)
MCV RBC AUTO: 90.4 FL (ref 81.4–97.8)
MCV RBC AUTO: 90.7 FL (ref 81.4–97.8)
MCV RBC AUTO: 91.9 FL (ref 81.4–97.8)
MODE IMODE: ABNORMAL
MONOCYTES # BLD AUTO: 0.37 K/UL (ref 0–0.85)
MONOCYTES # BLD AUTO: 0.48 K/UL (ref 0–0.85)
MONOCYTES # BLD AUTO: 0.5 K/UL (ref 0–0.85)
MONOCYTES NFR BLD AUTO: 6.1 % (ref 0–13.4)
MONOCYTES NFR BLD AUTO: 7.4 % (ref 0–13.4)
MONOCYTES NFR BLD AUTO: 7.6 % (ref 0–13.4)
NEUTROPHILS # BLD AUTO: 4.86 K/UL (ref 2–7.15)
NEUTROPHILS # BLD AUTO: 5.23 K/UL (ref 2–7.15)
NEUTROPHILS # BLD AUTO: 5.25 K/UL (ref 2–7.15)
NEUTROPHILS NFR BLD: 79.6 % (ref 44–72)
NEUTROPHILS NFR BLD: 80 % (ref 44–72)
NEUTROPHILS NFR BLD: 81.1 % (ref 44–72)
NRBC # BLD AUTO: 0 K/UL
NRBC # BLD AUTO: 0 K/UL
NRBC # BLD AUTO: 0.02 K/UL
NRBC BLD-RTO: 0 /100 WBC
NRBC BLD-RTO: 0 /100 WBC
NRBC BLD-RTO: 0.3 /100 WBC
O2/TOTAL GAS SETTING VFR VENT: 1 %
O2/TOTAL GAS SETTING VFR VENT: 60 %
PATHOLOGY CONSULT NOTE: NORMAL
PCO2 BLDA: 37 MMHG (ref 26–37)
PCO2 BLDA: 46.6 MMHG (ref 26–37)
PCO2 BLDA: 55.8 MMHG (ref 26–37)
PCO2 TEMP ADJ BLDA: 35.6 MMHG (ref 26–37)
PCO2 TEMP ADJ BLDA: 53.4 MMHG (ref 26–37)
PEEP END EXPIRATORY PRESSURE IPEEP: 10 CMH20
PH BLDA: 7.01 [PH] (ref 7.4–7.5)
PH BLDA: 7.22 [PH] (ref 7.4–7.5)
PH BLDA: 7.42 [PH] (ref 7.4–7.5)
PH TEMP ADJ BLDA: 7.02 [PH] (ref 7.4–7.5)
PH TEMP ADJ BLDA: 7.43 [PH] (ref 7.4–7.5)
PLATELET # BLD AUTO: 126 K/UL (ref 164–446)
PLATELET # BLD AUTO: 130 K/UL (ref 164–446)
PLATELET # BLD AUTO: 133 K/UL (ref 164–446)
PMV BLD AUTO: 9.4 FL (ref 9–12.9)
PMV BLD AUTO: 9.7 FL (ref 9–12.9)
PMV BLD AUTO: 9.7 FL (ref 9–12.9)
PO2 BLDA: 113 MMHG (ref 64–87)
PO2 BLDA: 157 MMHG (ref 64–87)
PO2 BLDA: 91 MMHG (ref 64–87)
PO2 TEMP ADJ BLDA: 152 MMHG (ref 64–87)
PO2 TEMP ADJ BLDA: 87 MMHG (ref 64–87)
POTASSIUM BLD-SCNC: 4.7 MMOL/L (ref 3.6–5.5)
POTASSIUM BLD-SCNC: 4.8 MMOL/L (ref 3.6–5.5)
POTASSIUM SERPL-SCNC: 3.4 MMOL/L (ref 3.6–5.5)
POTASSIUM SERPL-SCNC: 3.5 MMOL/L (ref 3.6–5.5)
POTASSIUM SERPL-SCNC: 3.7 MMOL/L (ref 3.6–5.5)
PROT SERPL-MCNC: 4.5 G/DL (ref 6–8.2)
PROT SERPL-MCNC: 4.5 G/DL (ref 6–8.2)
PROT SERPL-MCNC: 4.6 G/DL (ref 6–8.2)
RBC # BLD AUTO: 2.95 M/UL (ref 4.2–5.4)
RBC # BLD AUTO: 3 M/UL (ref 4.2–5.4)
RBC # BLD AUTO: 3.01 M/UL (ref 4.2–5.4)
SAO2 % BLDA: 97 % (ref 93–99)
SAO2 % BLDA: 97 % (ref 93–99)
SAO2 % BLDA: 98 % (ref 93–99)
SODIUM BLD-SCNC: 143 MMOL/L (ref 135–145)
SODIUM BLD-SCNC: 144 MMOL/L (ref 135–145)
SODIUM SERPL-SCNC: 140 MMOL/L (ref 135–145)
SODIUM SERPL-SCNC: 141 MMOL/L (ref 135–145)
SODIUM SERPL-SCNC: 141 MMOL/L (ref 135–145)
SPECIMEN DRAWN FROM PATIENT: ABNORMAL
TIDAL VOLUME IVT: 330 ML
WBC # BLD AUTO: 6.1 K/UL (ref 4.8–10.8)
WBC # BLD AUTO: 6.5 K/UL (ref 4.8–10.8)
WBC # BLD AUTO: 6.6 K/UL (ref 4.8–10.8)

## 2021-09-07 PROCEDURE — 99291 CRITICAL CARE FIRST HOUR: CPT | Performed by: SURGERY

## 2021-09-07 PROCEDURE — 302136 NUTRITION PUMP: Performed by: SURGERY

## 2021-09-07 PROCEDURE — 90791 PSYCH DIAGNOSTIC EVALUATION: CPT | Performed by: SOCIAL WORKER

## 2021-09-07 PROCEDURE — 700105 HCHG RX REV CODE 258: Performed by: SPECIALIST

## 2021-09-07 PROCEDURE — 700111 HCHG RX REV CODE 636 W/ 250 OVERRIDE (IP): Performed by: SURGERY

## 2021-09-07 PROCEDURE — 92950 HEART/LUNG RESUSCITATION CPR: CPT

## 2021-09-07 PROCEDURE — A9270 NON-COVERED ITEM OR SERVICE: HCPCS | Performed by: SURGERY

## 2021-09-07 PROCEDURE — 700102 HCHG RX REV CODE 250 W/ 637 OVERRIDE(OP): Performed by: SURGERY

## 2021-09-07 PROCEDURE — 85025 COMPLETE CBC W/AUTO DIFF WBC: CPT

## 2021-09-07 PROCEDURE — 5A1955Z RESPIRATORY VENTILATION, GREATER THAN 96 CONSECUTIVE HOURS: ICD-10-PCS | Performed by: SURGERY

## 2021-09-07 PROCEDURE — 31500 INSERT EMERGENCY AIRWAY: CPT

## 2021-09-07 PROCEDURE — 37799 UNLISTED PX VASCULAR SURGERY: CPT

## 2021-09-07 PROCEDURE — 94799 UNLISTED PULMONARY SVC/PX: CPT

## 2021-09-07 PROCEDURE — 80053 COMPREHEN METABOLIC PANEL: CPT

## 2021-09-07 PROCEDURE — 94003 VENT MGMT INPAT SUBQ DAY: CPT

## 2021-09-07 PROCEDURE — 71045 X-RAY EXAM CHEST 1 VIEW: CPT

## 2021-09-07 PROCEDURE — 770022 HCHG ROOM/CARE - ICU (200)

## 2021-09-07 PROCEDURE — 700111 HCHG RX REV CODE 636 W/ 250 OVERRIDE (IP): Performed by: SPECIALIST

## 2021-09-07 PROCEDURE — 82803 BLOOD GASES ANY COMBINATION: CPT

## 2021-09-07 PROCEDURE — 99152 MOD SED SAME PHYS/QHP 5/>YRS: CPT

## 2021-09-07 RX ORDER — ONDANSETRON 2 MG/ML
4 INJECTION INTRAMUSCULAR; INTRAVENOUS EVERY 4 HOURS PRN
Status: DISCONTINUED | OUTPATIENT
Start: 2021-09-07 | End: 2021-09-07

## 2021-09-07 RX ORDER — ACETAMINOPHEN 650 MG/1
650 SUPPOSITORY RECTAL EVERY 4 HOURS PRN
Status: DISCONTINUED | OUTPATIENT
Start: 2021-09-07 | End: 2021-09-12

## 2021-09-07 RX ORDER — ACETAMINOPHEN 325 MG/1
650 TABLET ORAL EVERY 4 HOURS PRN
Status: DISCONTINUED | OUTPATIENT
Start: 2021-09-07 | End: 2021-09-12

## 2021-09-07 RX ORDER — BISACODYL 10 MG
10 SUPPOSITORY, RECTAL RECTAL
Status: DISCONTINUED | OUTPATIENT
Start: 2021-09-07 | End: 2021-09-07

## 2021-09-07 RX ORDER — PROPOFOL 10 MG/ML
50 INJECTION, EMULSION INTRAVENOUS ONCE
Status: COMPLETED | OUTPATIENT
Start: 2021-09-07 | End: 2021-09-07

## 2021-09-07 RX ORDER — POLYETHYLENE GLYCOL 3350 17 G/17G
1 POWDER, FOR SOLUTION ORAL 2 TIMES DAILY
Status: DISCONTINUED | OUTPATIENT
Start: 2021-09-07 | End: 2021-09-12

## 2021-09-07 RX ORDER — ONDANSETRON 4 MG/1
4 TABLET, ORALLY DISINTEGRATING ORAL EVERY 4 HOURS PRN
Status: DISCONTINUED | OUTPATIENT
Start: 2021-09-07 | End: 2021-09-12

## 2021-09-07 RX ORDER — AMOXICILLIN 250 MG
1 CAPSULE ORAL NIGHTLY
Status: DISCONTINUED | OUTPATIENT
Start: 2021-09-07 | End: 2021-09-12

## 2021-09-07 RX ORDER — AMOXICILLIN 250 MG
1 CAPSULE ORAL
Status: DISCONTINUED | OUTPATIENT
Start: 2021-09-07 | End: 2021-09-12

## 2021-09-07 RX ORDER — AMOXICILLIN 250 MG
1 CAPSULE ORAL NIGHTLY
Status: DISCONTINUED | OUTPATIENT
Start: 2021-09-07 | End: 2021-09-07

## 2021-09-07 RX ORDER — POTASSIUM CHLORIDE 20 MEQ/1
40 TABLET, EXTENDED RELEASE ORAL ONCE
Status: COMPLETED | OUTPATIENT
Start: 2021-09-07 | End: 2021-09-07

## 2021-09-07 RX ORDER — DOCUSATE SODIUM 100 MG/1
100 CAPSULE, LIQUID FILLED ORAL 2 TIMES DAILY
Status: DISCONTINUED | OUTPATIENT
Start: 2021-09-07 | End: 2021-09-07

## 2021-09-07 RX ORDER — AMOXICILLIN 250 MG
1 CAPSULE ORAL
Status: DISCONTINUED | OUTPATIENT
Start: 2021-09-07 | End: 2021-09-07

## 2021-09-07 RX ORDER — POLYETHYLENE GLYCOL 3350 17 G/17G
1 POWDER, FOR SOLUTION ORAL 2 TIMES DAILY
Status: DISCONTINUED | OUTPATIENT
Start: 2021-09-07 | End: 2021-09-07

## 2021-09-07 RX ORDER — FUROSEMIDE 10 MG/ML
40 INJECTION INTRAMUSCULAR; INTRAVENOUS 2 TIMES DAILY
Status: COMPLETED | OUTPATIENT
Start: 2021-09-07 | End: 2021-09-08

## 2021-09-07 RX ORDER — SUCCINYLCHOLINE CHLORIDE 20 MG/ML
100 INJECTION INTRAMUSCULAR; INTRAVENOUS ONCE
Status: COMPLETED | OUTPATIENT
Start: 2021-09-07 | End: 2021-09-07

## 2021-09-07 RX ORDER — ENEMA 19; 7 G/133ML; G/133ML
1 ENEMA RECTAL
Status: DISCONTINUED | OUTPATIENT
Start: 2021-09-07 | End: 2021-09-07

## 2021-09-07 RX ORDER — FAMOTIDINE 20 MG/1
20 TABLET, FILM COATED ORAL 2 TIMES DAILY
Status: DISCONTINUED | OUTPATIENT
Start: 2021-09-07 | End: 2021-09-12

## 2021-09-07 RX ORDER — DOCUSATE SODIUM 50 MG/5ML
100 LIQUID ORAL 2 TIMES DAILY
Status: DISCONTINUED | OUTPATIENT
Start: 2021-09-07 | End: 2021-09-12

## 2021-09-07 RX ORDER — ONDANSETRON 4 MG/1
4 TABLET, ORALLY DISINTEGRATING ORAL EVERY 4 HOURS PRN
Status: DISCONTINUED | OUTPATIENT
Start: 2021-09-07 | End: 2021-09-07

## 2021-09-07 RX ADMIN — PROPOFOL 35 MCG/KG/MIN: 10 INJECTION, EMULSION INTRAVENOUS at 06:46

## 2021-09-07 RX ADMIN — FAMOTIDINE 20 MG: 10 INJECTION INTRAVENOUS at 05:06

## 2021-09-07 RX ADMIN — HYDROMORPHONE HYDROCHLORIDE 0.5 MG: 1 INJECTION, SOLUTION INTRAMUSCULAR; INTRAVENOUS; SUBCUTANEOUS at 00:26

## 2021-09-07 RX ADMIN — FUROSEMIDE 40 MG: 10 INJECTION, SOLUTION INTRAMUSCULAR; INTRAVENOUS at 18:01

## 2021-09-07 RX ADMIN — SENNOSIDES AND DOCUSATE SODIUM 1 TABLET: 50; 8.6 TABLET ORAL at 21:15

## 2021-09-07 RX ADMIN — HYDROMORPHONE HYDROCHLORIDE 0.5 MG: 1 INJECTION, SOLUTION INTRAMUSCULAR; INTRAVENOUS; SUBCUTANEOUS at 12:25

## 2021-09-07 RX ADMIN — DOCUSATE SODIUM 100 MG: 50 LIQUID ORAL at 14:04

## 2021-09-07 RX ADMIN — PIPERACILLIN AND TAZOBACTAM 3.38 G: 3; .375 INJECTION, POWDER, LYOPHILIZED, FOR SOLUTION INTRAVENOUS; PARENTERAL at 05:07

## 2021-09-07 RX ADMIN — PROPOFOL 5 MCG/KG/MIN: 10 INJECTION, EMULSION INTRAVENOUS at 00:26

## 2021-09-07 RX ADMIN — QUETIAPINE FUMARATE 100 MG: 100 TABLET ORAL at 17:52

## 2021-09-07 RX ADMIN — POLYETHYLENE GLYCOL 3350 1 PACKET: 17 POWDER, FOR SOLUTION ORAL at 17:53

## 2021-09-07 RX ADMIN — HYDROMORPHONE HYDROCHLORIDE 0.5 MG: 1 INJECTION, SOLUTION INTRAMUSCULAR; INTRAVENOUS; SUBCUTANEOUS at 21:15

## 2021-09-07 RX ADMIN — FAMOTIDINE 20 MG: 10 INJECTION INTRAVENOUS at 17:52

## 2021-09-07 RX ADMIN — SUCCINYLCHOLINE CHLORIDE 100 MG: 20 INJECTION, SOLUTION INTRAMUSCULAR; INTRAVENOUS; PARENTERAL at 00:14

## 2021-09-07 RX ADMIN — ENOXAPARIN SODIUM 30 MG: 30 INJECTION SUBCUTANEOUS at 17:52

## 2021-09-07 RX ADMIN — PROPOFOL 35 MCG/KG/MIN: 10 INJECTION, EMULSION INTRAVENOUS at 20:23

## 2021-09-07 RX ADMIN — FUROSEMIDE 40 MG: 10 INJECTION, SOLUTION INTRAMUSCULAR; INTRAVENOUS at 14:04

## 2021-09-07 RX ADMIN — PROPOFOL 50 MG: 10 INJECTION, EMULSION INTRAVENOUS at 00:13

## 2021-09-07 RX ADMIN — POTASSIUM CHLORIDE 40 MEQ: 1500 TABLET, EXTENDED RELEASE ORAL at 14:04

## 2021-09-07 RX ADMIN — HYDROMORPHONE HYDROCHLORIDE 0.5 MG: 1 INJECTION, SOLUTION INTRAMUSCULAR; INTRAVENOUS; SUBCUTANEOUS at 16:12

## 2021-09-07 ASSESSMENT — PAIN DESCRIPTION - PAIN TYPE
TYPE: ACUTE PAIN

## 2021-09-07 NOTE — CONSULTS
Brief Behavioral Health Note    Consult request received. Attempted to interview patient however she is currently intubated and unable to participate in the interview process . Please re-submit a consult request when patient is alert and able to engage, we will be happy to return at that time.    Thank you    Carey Ignacio, Ph.D., [facility]

## 2021-09-07 NOTE — PROGRESS NOTES
Neurosurgery Progress Note    Subjective:  No events, re-intubated yesterday      Exam:  Intubated sedated   Cn intact  MAEW  Not FC    BP  Min: 123/75  Max: 157/102  Pulse  Av.5  Min: 93  Max: 147  Resp  Av.9  Min: 9  Max: 48  Temp  Av.7 °C (98 °F)  Min: 36.6 °C (97.8 °F)  Max: 36.7 °C (98.1 °F)  Monitored Temp 2  Av.3 °C (99.2 °F)  Min: 37.3 °C (99.1 °F)  Max: 37.4 °C (99.3 °F)  SpO2  Av.6 %  Min: 66 %  Max: 100 %    No data recorded    Recent Labs     216   WBC 6.3 6.5 6.1   RBC 3.08* 3.01* 3.00*   HEMOGLOBIN 9.3* 9.0* 9.0*   HEMATOCRIT 27.6* 27.2* 27.2*   MCV 89.6 90.4 90.7   MCH 30.2 29.9 30.0   MCHC 33.7 33.1* 33.1*   RDW 55.8* 57.1* 57.1*   PLATELETCT 124* 126* 133*   MPV 10.2 9.7 9.7     Recent Labs     216   SODIUM 140 140 141   POTASSIUM 3.8 3.7 3.5*   CHLORIDE 109 109 111   CO2 22 22 21   GLUCOSE 100* 89 77   BUN 13 11 10   CREATININE 0.44* 0.34* 0.31*   CALCIUM 7.5* 7.7* 7.6*         Recent Labs     21  1240   REACTMIN 4.3*   CLOTKINET 1.9   CLOTANGL 71.3   MAXCLOTS 50.6*   [id number] 0.0       Intake/Output                       21 - 21 - 21 Total  Total                 Intake    P.O.  --  0 0  --  -- --    P.O. -- 0 0 -- -- --    I.V.  1303.2  1269.3 2572.5  211  -- 211    Propofol Volume 103.2 69.3 172.5 11 -- 11    Volume (mL) (NS infusion) 1200 1200 2400 200 -- 200    Other  --  0 0  --  -- --    Medications (PO/Enteral Liquids) -- 0 0 -- -- --    IV Piggyback  500  0 500  --  -- --    Volume (mL) (NS (BOLUS) infusion 500 mL) 500 0 500 -- -- --    Total Intake 1803.2 1269.3 3072.5 211 -- 211       Output    Urine  150  245 395  60  -- 60    Number of Times Incontinent of Urine 1 x 2 x 3 x -- -- --    Output (mL) ([REMOVED] Urethral Catheter) 150 -- 150 -- -- --    Output (mL) (Urethral Catheter) --  245 245 60 -- 60    Drains  70  380 450  --  -- --    Output (mL) (Closed/Suction Drain 1 Right Abdomen Nadre Barros 19 Fr.) 70 380 450 -- -- --    Stool  --  -- --  0  -- 0    Number of Times Stooled -- -- -- 0 x -- 0 x    Measurable Stool (mL) -- -- -- 0 -- 0    Chest Tube  470  300 770  --  -- --    Output (mL) (Chest Tube 1 Right) 470 300 770 -- -- --    Total Output  60 -- 60       Net I/O     1113.2 344.3 1457.5 151 -- 151            Intake/Output Summary (Last 24 hours) at 9/7/2021 0955  Last data filed at 9/7/2021 0800  Gross per 24 hour   Intake 2533.92 ml   Output 1675 ml   Net 858.92 ml            • Respiratory Therapy Consult   Continuous RT   • senna-docusate  1 Tablet Nightly   • polyethylene glycol/lytes  1 Packet BID   • magnesium hydroxide  30 mL DAILY   • propofol  0-80 mcg/kg/min Continuous   • famotidine  20 mg BID    Or   • famotidine  20 mg BID   • potassium chloride SA  40 mEq Once   • haloperidol lactate  5 mg Q4HRS PRN   • QUEtiapine  100 mg BID   • Respiratory Therapy Consult   Continuous RT   • Pharmacy Consult Request  1 Each PHARMACY TO DOSE   • ondansetron  4 mg Q4HRS PRN   • ondansetron  4 mg Q4HRS PRN   • docusate sodium  100 mg BID   • senna-docusate  1 Tablet Q24HRS PRN   • bisacodyl  10 mg Q24HRS PRN   • fleet  1 Each Once PRN   • NS   Continuous   • acetaminophen  650 mg Q4HRS PRN    Or   • acetaminophen  650 mg Q4HRS PRN   • norepinephrine (Levophed) infusion  0-30 mcg/min Continuous   • glucose  16 g Q15 MIN PRN    And   • dextrose 50%  50 mL Q15 MIN PRN   • HYDROmorphone  0.5 mg Q HOUR PRN   • midazolam  1-5 mg Q HOUR PRN       Assessment and Plan:  Hospital day # 4  POD# na  Chemical prophylactic DVT therapy: yes  Start date/time: 9/5/2021    MRI pending not possible   Ok for Q4hr neuro checks  [place] at all times  T/L psine cleared  No further NSGY needs at this time will have her fu in clinic in 6 weeks with Xrays    Max  30 mins in pt care

## 2021-09-07 NOTE — PROGRESS NOTES
Pt sating 91% on 10L Oxymask, pt not participating in breathing exercises, weak cough, pt sounds congested and wet.     Stat chest xray ordered. Dr. Tesfaye came to bedside and intubated pt at 0015. Positive yellow color change. VSS during procedure.     Gonzalez placed post intubation.

## 2021-09-07 NOTE — DISCHARGE PLANNING
LUISW called [facility] records ([phone]) and spoke with Shakira who reports that [employer] has confirmed patient's ID to be Yris Juárez. Confirm spelling & .     LSWattempted another NOK search with no results found.     LEVI then sent urgent faxes to both [facility] and [facility] records dept requesting any emergency contact/NOK information on pt. Both fax confirmation stated complete.     Plan: Locate family/NOK

## 2021-09-07 NOTE — CARE PLAN
Problem: Knowledge Deficit - Standard  Goal: Patient and family/care givers will demonstrate understanding of plan of care, disease process/condition, diagnostic tests and medications  Outcome: Not Progressing     Problem: Bowel Elimination  Goal: Establish and maintain regular bowel function  Outcome: Not Progressing     Problem: Pain - Standard  Goal: Alleviation of pain or a reduction in pain to the patient’s comfort goal  Outcome: Progressing     Problem: Safety - Medical Restraint  Goal: Remains free of injury from restraints (Restraint for Interference with Medical Device)  Outcome: Progressing  Goal: Free from restraint(s) (Restraint for Interference with Medical Device)  Outcome: Progressing     Problem: Skin Integrity  Goal: Skin integrity is maintained or improved  Outcome: Progressing     Problem: Fall Risk  Goal: Patient will remain free from falls  Outcome: Progressing     Problem: Psychosocial  Goal: Patient's level of anxiety will decrease  Outcome: Progressing  Goal: Patient's ability to verbalize feelings about condition will improve  Outcome: Progressing  Goal: Patient's ability to re-evaluate and adapt role responsibilities will improve  Outcome: Progressing  Goal: Patient and family will demonstrate ability to cope with life altering diagnosis and/or procedure  Outcome: Progressing  Goal: Spiritual and cultural needs incorporated into hospitalization  Outcome: Progressing     Problem: Hemodynamics  Goal: Patient's hemodynamics, fluid balance and neurologic status will be stable or improve  Outcome: Progressing     Problem: Respiratory  Goal: Patient will achieve/maintain optimum respiratory ventilation and gas exchange  Outcome: Progressing     Problem: Mechanical Ventilation  Goal: Safe management of artificial airway and ventilation  Outcome: Progressing  Goal: Successful weaning off mechanical ventilator, spontaneously maintains adequate gas exchange  Outcome: Progressing  Goal: Patient will  be able to express needs and understand communication  Outcome: Progressing     Problem: Risk for Aspiration  Goal: Patient's risk for aspiration will be absent or decrease  Outcome: Progressing     Problem: Urinary - Renal Perfusion  Goal: Ability to achieve and maintain adequate renal perfusion and functioning will improve  Outcome: Progressing     Problem: Venous Thromboembolism (VTE) Prevention  Goal: The patient will remain free from venous thromboembolism (VTE)  Outcome: Progressing     Problem: Nutrition  Goal: Patient's nutritional and fluid intake will be adequate or improve  Outcome: Progressing  Goal: Enteral nutrition will be maintained or improve  Outcome: Progressing  Goal: Enteral nutrition will be maintained or improve  Outcome: Progressing     Problem: Urinary Elimination  Goal: Establish and maintain regular urinary output  Outcome: Progressing     The patient is Stable - Low risk of patient condition declining or worsening    Shift Goals  Clinical Goals: Hemodynamic stability, begin breathing trials and awakening trials'  Patient Goals: Pain control, start nutrition  Family Goals: N/A    Progress made toward(s) clinical / shift goals:  SAT and SBT performed twice daily by RN and RT.  Patient mobilized to edge of bed, feeding tube inserted and enteral nutrition started.      Patient is not progressing towards the following goals:      Problem: Knowledge Deficit - Standard  Goal: Patient and family/care givers will demonstrate understanding of plan of care, disease process/condition, diagnostic tests and medications  Outcome: Not Progressing     Problem: Bowel Elimination  Goal: Establish and maintain regular bowel function  Outcome: Not Progressing

## 2021-09-07 NOTE — PROGRESS NOTES
Psych at bedside.  Unable to assess chip to reintubation last night.  Will re-contact once patient is extubated.

## 2021-09-07 NOTE — CARE PLAN
Problem: Ventilation  Goal: Ability to achieve and maintain unassisted ventilation or tolerate decreased levels of ventilator support  Description: Document on Vent flowsheet    1.  Support and monitor invasive and noninvasive mechanical ventilation  2.  Monitor ventilator weaning response  3.  Perform ventilator associated pneumonia prevention interventions  4.  Manage ventilation therapy by monitoring diagnostic test results  Outcome: Progressing    Vent Day 4  8.0 24  24 330 +8 50%

## 2021-09-07 NOTE — CARE PLAN
Problem: Nutritional:  Goal: Nutrition support tolerated and meeting greater than 85% of estimated needs  Outcome: Not Met   See Dietetic Intern note.
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
## 2021-09-07 NOTE — CARE PLAN
The patient is Watcher - Medium risk of patient condition declining or worsening    Shift Goals  Clinical Goals: Hemodynamically stable, watch for s/sx of bleeding, calm   Patient Goals: MENA  Family Goals: MENA      Problem: Safety - Medical Restraint  Goal: Remains free of injury from restraints (Restraint for Interference with Medical Device)  Outcome: Progressing  Goal: Free from restraint(s) (Restraint for Interference with Medical Device)  Outcome: Progressing     Problem: Skin Integrity  Goal: Skin integrity is maintained or improved  Outcome: Progressing       Problem: Mechanical Ventilation  Goal: Safe management of artificial airway and ventilation  Outcome: Not Progressing      2017  WNL

## 2021-09-08 ENCOUNTER — APPOINTMENT (OUTPATIENT)
Dept: RADIOLOGY | Facility: MEDICAL CENTER | Age: 36
DRG: 957 | End: 2021-09-08
Attending: SPECIALIST
Payer: MEDICAID

## 2021-09-08 LAB
ALBUMIN SERPL BCP-MCNC: 2 G/DL (ref 3.2–4.9)
ALBUMIN/GLOB SERPL: 0.7 G/DL
ALP SERPL-CCNC: 76 U/L (ref 30–99)
ALT SERPL-CCNC: 73 U/L (ref 2–50)
ANION GAP SERPL CALC-SCNC: 11 MMOL/L (ref 7–16)
AST SERPL-CCNC: 58 U/L (ref 12–45)
BASOPHILS # BLD AUTO: 0.3 % (ref 0–1.8)
BASOPHILS # BLD: 0.02 K/UL (ref 0–0.12)
BILIRUB SERPL-MCNC: 0.3 MG/DL (ref 0.1–1.5)
BUN SERPL-MCNC: 11 MG/DL (ref 8–22)
CALCIUM SERPL-MCNC: 8 MG/DL (ref 8.5–10.5)
CHLORIDE SERPL-SCNC: 106 MMOL/L (ref 96–112)
CO2 SERPL-SCNC: 23 MMOL/L (ref 20–33)
CREAT SERPL-MCNC: 0.32 MG/DL (ref 0.5–1.4)
CRP SERPL HS-MCNC: 15.68 MG/DL (ref 0–0.75)
EOSINOPHIL # BLD AUTO: 0.09 K/UL (ref 0–0.51)
EOSINOPHIL NFR BLD: 1.3 % (ref 0–6.9)
ERYTHROCYTE [DISTWIDTH] IN BLOOD BY AUTOMATED COUNT: 58 FL (ref 35.9–50)
GLOBULIN SER CALC-MCNC: 2.8 G/DL (ref 1.9–3.5)
GLUCOSE SERPL-MCNC: 105 MG/DL (ref 65–99)
HCT VFR BLD AUTO: 28.8 % (ref 37–47)
HGB BLD-MCNC: 9.3 G/DL (ref 12–16)
IMM GRANULOCYTES # BLD AUTO: 0.01 K/UL (ref 0–0.11)
IMM GRANULOCYTES NFR BLD AUTO: 0.1 % (ref 0–0.9)
LYMPHOCYTES # BLD AUTO: 0.78 K/UL (ref 1–4.8)
LYMPHOCYTES NFR BLD: 11.7 % (ref 22–41)
MAGNESIUM SERPL-MCNC: 1.5 MG/DL (ref 1.5–2.5)
MCH RBC QN AUTO: 29.4 PG (ref 27–33)
MCHC RBC AUTO-ENTMCNC: 32.3 G/DL (ref 33.6–35)
MCV RBC AUTO: 91.1 FL (ref 81.4–97.8)
MONOCYTES # BLD AUTO: 0.61 K/UL (ref 0–0.85)
MONOCYTES NFR BLD AUTO: 9.1 % (ref 0–13.4)
NEUTROPHILS # BLD AUTO: 5.18 K/UL (ref 2–7.15)
NEUTROPHILS NFR BLD: 77.5 % (ref 44–72)
NRBC # BLD AUTO: 0 K/UL
NRBC BLD-RTO: 0 /100 WBC
PLATELET # BLD AUTO: 153 K/UL (ref 164–446)
PMV BLD AUTO: 9.5 FL (ref 9–12.9)
POTASSIUM SERPL-SCNC: 3.2 MMOL/L (ref 3.6–5.5)
PREALB SERPL-MCNC: 8.4 MG/DL (ref 18–38)
PROT SERPL-MCNC: 4.8 G/DL (ref 6–8.2)
RBC # BLD AUTO: 3.16 M/UL (ref 4.2–5.4)
SODIUM SERPL-SCNC: 140 MMOL/L (ref 135–145)
WBC # BLD AUTO: 6.7 K/UL (ref 4.8–10.8)

## 2021-09-08 PROCEDURE — 770022 HCHG ROOM/CARE - ICU (200)

## 2021-09-08 PROCEDURE — 94003 VENT MGMT INPAT SUBQ DAY: CPT

## 2021-09-08 PROCEDURE — 700102 HCHG RX REV CODE 250 W/ 637 OVERRIDE(OP): Performed by: SURGERY

## 2021-09-08 PROCEDURE — 84134 ASSAY OF PREALBUMIN: CPT

## 2021-09-08 PROCEDURE — 700111 HCHG RX REV CODE 636 W/ 250 OVERRIDE (IP): Performed by: SPECIALIST

## 2021-09-08 PROCEDURE — 94150 VITAL CAPACITY TEST: CPT

## 2021-09-08 PROCEDURE — 71045 X-RAY EXAM CHEST 1 VIEW: CPT

## 2021-09-08 PROCEDURE — 700111 HCHG RX REV CODE 636 W/ 250 OVERRIDE (IP): Performed by: SURGERY

## 2021-09-08 PROCEDURE — 99291 CRITICAL CARE FIRST HOUR: CPT | Performed by: SURGERY

## 2021-09-08 PROCEDURE — A9270 NON-COVERED ITEM OR SERVICE: HCPCS | Performed by: SPECIALIST

## 2021-09-08 PROCEDURE — A9270 NON-COVERED ITEM OR SERVICE: HCPCS | Performed by: SURGERY

## 2021-09-08 PROCEDURE — 80053 COMPREHEN METABOLIC PANEL: CPT

## 2021-09-08 PROCEDURE — C1729 CATH, DRAINAGE: HCPCS

## 2021-09-08 PROCEDURE — 86140 C-REACTIVE PROTEIN: CPT

## 2021-09-08 PROCEDURE — 700102 HCHG RX REV CODE 250 W/ 637 OVERRIDE(OP): Performed by: SPECIALIST

## 2021-09-08 PROCEDURE — 700105 HCHG RX REV CODE 258: Performed by: SPECIALIST

## 2021-09-08 PROCEDURE — 94799 UNLISTED PULMONARY SVC/PX: CPT

## 2021-09-08 PROCEDURE — 85025 COMPLETE CBC W/AUTO DIFF WBC: CPT

## 2021-09-08 PROCEDURE — 83735 ASSAY OF MAGNESIUM: CPT

## 2021-09-08 RX ORDER — FUROSEMIDE 10 MG/ML
40 INJECTION INTRAMUSCULAR; INTRAVENOUS 2 TIMES DAILY
Status: COMPLETED | OUTPATIENT
Start: 2021-09-08 | End: 2021-09-09

## 2021-09-08 RX ORDER — OXYCODONE HYDROCHLORIDE 5 MG/1
5-10 TABLET ORAL
Status: DISCONTINUED | OUTPATIENT
Start: 2021-09-08 | End: 2021-09-08

## 2021-09-08 RX ORDER — OXYCODONE HYDROCHLORIDE 10 MG/1
10 TABLET ORAL
Status: DISCONTINUED | OUTPATIENT
Start: 2021-09-08 | End: 2021-09-12

## 2021-09-08 RX ORDER — OXYCODONE HYDROCHLORIDE 5 MG/1
5 TABLET ORAL
Status: DISCONTINUED | OUTPATIENT
Start: 2021-09-08 | End: 2021-09-12

## 2021-09-08 RX ADMIN — QUETIAPINE FUMARATE 100 MG: 100 TABLET ORAL at 16:52

## 2021-09-08 RX ADMIN — QUETIAPINE FUMARATE 100 MG: 100 TABLET ORAL at 05:47

## 2021-09-08 RX ADMIN — HYDROMORPHONE HYDROCHLORIDE 0.5 MG: 1 INJECTION, SOLUTION INTRAMUSCULAR; INTRAVENOUS; SUBCUTANEOUS at 05:47

## 2021-09-08 RX ADMIN — DOCUSATE SODIUM 100 MG: 50 LIQUID ORAL at 16:52

## 2021-09-08 RX ADMIN — HYDROMORPHONE HYDROCHLORIDE 0.5 MG: 1 INJECTION, SOLUTION INTRAMUSCULAR; INTRAVENOUS; SUBCUTANEOUS at 08:12

## 2021-09-08 RX ADMIN — SENNOSIDES AND DOCUSATE SODIUM 1 TABLET: 50; 8.6 TABLET ORAL at 19:54

## 2021-09-08 RX ADMIN — OXYCODONE 5 MG: 5 TABLET ORAL at 14:06

## 2021-09-08 RX ADMIN — HYDROMORPHONE HYDROCHLORIDE 0.5 MG: 1 INJECTION, SOLUTION INTRAMUSCULAR; INTRAVENOUS; SUBCUTANEOUS at 12:29

## 2021-09-08 RX ADMIN — OXYCODONE HYDROCHLORIDE 10 MG: 10 TABLET ORAL at 16:52

## 2021-09-08 RX ADMIN — ENOXAPARIN SODIUM 30 MG: 30 INJECTION SUBCUTANEOUS at 16:52

## 2021-09-08 RX ADMIN — OXYCODONE HYDROCHLORIDE 10 MG: 10 TABLET ORAL at 19:51

## 2021-09-08 RX ADMIN — FUROSEMIDE 40 MG: 10 INJECTION, SOLUTION INTRAMUSCULAR; INTRAVENOUS at 17:00

## 2021-09-08 RX ADMIN — FAMOTIDINE 20 MG: 20 TABLET ORAL at 16:52

## 2021-09-08 RX ADMIN — FAMOTIDINE 20 MG: 20 TABLET ORAL at 05:48

## 2021-09-08 RX ADMIN — SODIUM CHLORIDE: 9 INJECTION, SOLUTION INTRAVENOUS at 09:30

## 2021-09-08 RX ADMIN — POLYETHYLENE GLYCOL 3350 1 PACKET: 17 POWDER, FOR SOLUTION ORAL at 05:48

## 2021-09-08 RX ADMIN — DOCUSATE SODIUM 100 MG: 50 LIQUID ORAL at 05:48

## 2021-09-08 RX ADMIN — OXYCODONE HYDROCHLORIDE 10 MG: 10 TABLET ORAL at 10:24

## 2021-09-08 RX ADMIN — PROPOFOL 35 MCG/KG/MIN: 10 INJECTION, EMULSION INTRAVENOUS at 03:07

## 2021-09-08 RX ADMIN — FUROSEMIDE 40 MG: 10 INJECTION, SOLUTION INTRAMUSCULAR; INTRAVENOUS at 05:48

## 2021-09-08 RX ADMIN — POTASSIUM BICARBONATE 50 MEQ: 978 TABLET, EFFERVESCENT ORAL at 09:40

## 2021-09-08 RX ADMIN — ENOXAPARIN SODIUM 30 MG: 30 INJECTION SUBCUTANEOUS at 05:47

## 2021-09-08 RX ADMIN — POLYETHYLENE GLYCOL 3350 1 PACKET: 17 POWDER, FOR SOLUTION ORAL at 16:52

## 2021-09-08 RX ADMIN — OXYCODONE HYDROCHLORIDE 10 MG: 10 TABLET ORAL at 23:52

## 2021-09-08 ASSESSMENT — PAIN DESCRIPTION - PAIN TYPE
TYPE: ACUTE PAIN

## 2021-09-08 ASSESSMENT — FIBROSIS 4 INDEX: FIB4 SCORE: 1.51

## 2021-09-08 ASSESSMENT — PULMONARY FUNCTION TESTS: FVC: .7

## 2021-09-08 NOTE — ASSESSMENT & PLAN NOTE
9/7 fluid balance +17 L  High chest tube output  Lasix regimen started  9/9 no longer edematous.  Stop Lasix after next dose
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
## 2021-09-08 NOTE — CARE PLAN
The patient is Watcher - Medium risk of patient condition declining or worsening    Shift Goals  Clinical Goals: Hemodynamic stability, begin breathing trials and awakening trials'  Patient Goals: Pain control, start nutrition  Family Goals: N/A      Problem: Safety - Medical Restraint  Goal: Remains free of injury from restraints (Restraint for Interference with Medical Device)  Outcome: Progressing  Goal: Free from restraint(s) (Restraint for Interference with Medical Device)  Outcome: Progressing     Problem: Skin Integrity  Goal: Skin integrity is maintained or improved  Outcome: Progressing     Problem: Safety - Medical Restraint  Goal: Remains free of injury from restraints (Restraint for Interference with Medical Device)  Outcome: Progressing  Goal: Free from restraint(s) (Restraint for Interference with Medical Device)  Outcome: Progressing     Problem: Skin Integrity  Goal: Skin integrity is maintained or improved  Outcome: Progressing

## 2021-09-08 NOTE — CARE PLAN
The patient is Watcher - Medium risk of patient condition declining or worsening    Shift Goals  Clinical Goals: Pain control  Patient Goals: Pain control  Family Goals: not family available     Progress made toward(s) clinical / shift goals:    Problem: Knowledge Deficit - Standard  Goal: Patient and family/care givers will demonstrate understanding of plan of care, disease process/condition, diagnostic tests and medications  Outcome: Progressing     Problem: Pain - Standard  Goal: Alleviation of pain or a reduction in pain to the patient’s comfort goal  Outcome: Progressing     Problem: Safety - Medical Restraint  Goal: Remains free of injury from restraints (Restraint for Interference with Medical Device)  Outcome: Progressing  Goal: Free from restraint(s) (Restraint for Interference with Medical Device)  Outcome: Progressing     Problem: Skin Integrity  Goal: Skin integrity is maintained or improved  Outcome: Progressing

## 2021-09-08 NOTE — PROGRESS NOTES
2 RN Skin Check with Robb RN     Hair washed and brushed - no redness to back of head  No s/sx of redness at c-collar site - padded with mepilex   [place] pressing coretrak up into nose - repositioning coretrak [id number], no s/sx of redness   Oral care [id number], repositoning ETT tube [id number]  Generalized bruising to bilateral arms  prevena in place for midline abdomen, CDI   R chest tube site redressed and cleased with gauze and hypafix tape   RUDI drain site to R side of abdomen CDI   Abrasion to upper back - cleansed and mepilex applied   Mepilex applied to sacrum   Vaginal/perineal bleeding - cleansed, lopez care qshift   SCD's repositioned  Small circular wounds to bilateral lower legs and feet  Heels dry and calloused    Q2 hour turns, oral care and ETT tube repositioned q2 hours, Coretrak repositioned q2 hours, Linens changed, bath given, ensured pt free from all devices, q shift skin check

## 2021-09-09 ENCOUNTER — APPOINTMENT (OUTPATIENT)
Dept: RADIOLOGY | Facility: MEDICAL CENTER | Age: 36
DRG: 957 | End: 2021-09-09
Attending: SPECIALIST
Payer: MEDICAID

## 2021-09-09 LAB
ALBUMIN SERPL BCP-MCNC: 2.2 G/DL (ref 3.2–4.9)
ALBUMIN/GLOB SERPL: 0.8 G/DL
ALP SERPL-CCNC: 75 U/L (ref 30–99)
ALT SERPL-CCNC: 54 U/L (ref 2–50)
ANION GAP SERPL CALC-SCNC: 8 MMOL/L (ref 7–16)
AST SERPL-CCNC: 40 U/L (ref 12–45)
BASOPHILS # BLD AUTO: 0.1 % (ref 0–1.8)
BASOPHILS # BLD: 0.01 K/UL (ref 0–0.12)
BILIRUB SERPL-MCNC: 0.2 MG/DL (ref 0.1–1.5)
BUN SERPL-MCNC: 17 MG/DL (ref 8–22)
CALCIUM SERPL-MCNC: 8.5 MG/DL (ref 8.5–10.5)
CHLORIDE SERPL-SCNC: 106 MMOL/L (ref 96–112)
CO2 SERPL-SCNC: 30 MMOL/L (ref 20–33)
CREAT SERPL-MCNC: 0.35 MG/DL (ref 0.5–1.4)
EOSINOPHIL # BLD AUTO: 0.03 K/UL (ref 0–0.51)
EOSINOPHIL NFR BLD: 0.4 % (ref 0–6.9)
ERYTHROCYTE [DISTWIDTH] IN BLOOD BY AUTOMATED COUNT: 57.4 FL (ref 35.9–50)
GLOBULIN SER CALC-MCNC: 2.9 G/DL (ref 1.9–3.5)
GLUCOSE SERPL-MCNC: 130 MG/DL (ref 65–99)
HCT VFR BLD AUTO: 29.7 % (ref 37–47)
HGB BLD-MCNC: 9.8 G/DL (ref 12–16)
IMM GRANULOCYTES # BLD AUTO: 0.04 K/UL (ref 0–0.11)
IMM GRANULOCYTES NFR BLD AUTO: 0.5 % (ref 0–0.9)
LYMPHOCYTES # BLD AUTO: 0.76 K/UL (ref 1–4.8)
LYMPHOCYTES NFR BLD: 10.2 % (ref 22–41)
MCH RBC QN AUTO: 30.4 PG (ref 27–33)
MCHC RBC AUTO-ENTMCNC: 33 G/DL (ref 33.6–35)
MCV RBC AUTO: 92.2 FL (ref 81.4–97.8)
MONOCYTES # BLD AUTO: 0.58 K/UL (ref 0–0.85)
MONOCYTES NFR BLD AUTO: 7.8 % (ref 0–13.4)
NEUTROPHILS # BLD AUTO: 6.04 K/UL (ref 2–7.15)
NEUTROPHILS NFR BLD: 81 % (ref 44–72)
NRBC # BLD AUTO: 0 K/UL
NRBC BLD-RTO: 0 /100 WBC
PLATELET # BLD AUTO: 183 K/UL (ref 164–446)
PMV BLD AUTO: 10 FL (ref 9–12.9)
POTASSIUM SERPL-SCNC: 3.4 MMOL/L (ref 3.6–5.5)
PROT SERPL-MCNC: 5.1 G/DL (ref 6–8.2)
RBC # BLD AUTO: 3.22 M/UL (ref 4.2–5.4)
SODIUM SERPL-SCNC: 144 MMOL/L (ref 135–145)
TRIGL SERPL-MCNC: 109 MG/DL (ref 0–149)
WBC # BLD AUTO: 7.5 K/UL (ref 4.8–10.8)

## 2021-09-09 PROCEDURE — 94799 UNLISTED PULMONARY SVC/PX: CPT

## 2021-09-09 PROCEDURE — 770022 HCHG ROOM/CARE - ICU (200)

## 2021-09-09 PROCEDURE — A9270 NON-COVERED ITEM OR SERVICE: HCPCS | Performed by: SURGERY

## 2021-09-09 PROCEDURE — A9270 NON-COVERED ITEM OR SERVICE: HCPCS | Performed by: SPECIALIST

## 2021-09-09 PROCEDURE — 94003 VENT MGMT INPAT SUBQ DAY: CPT

## 2021-09-09 PROCEDURE — 700111 HCHG RX REV CODE 636 W/ 250 OVERRIDE (IP): Performed by: SURGERY

## 2021-09-09 PROCEDURE — 700102 HCHG RX REV CODE 250 W/ 637 OVERRIDE(OP): Performed by: SPECIALIST

## 2021-09-09 PROCEDURE — 700102 HCHG RX REV CODE 250 W/ 637 OVERRIDE(OP): Performed by: SURGERY

## 2021-09-09 PROCEDURE — 99291 CRITICAL CARE FIRST HOUR: CPT | Performed by: SURGERY

## 2021-09-09 PROCEDURE — 80053 COMPREHEN METABOLIC PANEL: CPT

## 2021-09-09 PROCEDURE — 85025 COMPLETE CBC W/AUTO DIFF WBC: CPT

## 2021-09-09 PROCEDURE — 84478 ASSAY OF TRIGLYCERIDES: CPT

## 2021-09-09 PROCEDURE — 71045 X-RAY EXAM CHEST 1 VIEW: CPT

## 2021-09-09 RX ORDER — MAGNESIUM SULFATE HEPTAHYDRATE 40 MG/ML
2 INJECTION, SOLUTION INTRAVENOUS ONCE
Status: COMPLETED | OUTPATIENT
Start: 2021-09-09 | End: 2021-09-09

## 2021-09-09 RX ADMIN — FUROSEMIDE 40 MG: 10 INJECTION, SOLUTION INTRAMUSCULAR; INTRAVENOUS at 05:49

## 2021-09-09 RX ADMIN — OXYCODONE HYDROCHLORIDE 10 MG: 10 TABLET ORAL at 04:53

## 2021-09-09 RX ADMIN — HYDROMORPHONE HYDROCHLORIDE 0.5 MG: 1 INJECTION, SOLUTION INTRAMUSCULAR; INTRAVENOUS; SUBCUTANEOUS at 05:50

## 2021-09-09 RX ADMIN — MAGNESIUM SULFATE 2 G: 2 INJECTION INTRAVENOUS at 08:51

## 2021-09-09 RX ADMIN — FAMOTIDINE 20 MG: 20 TABLET ORAL at 05:49

## 2021-09-09 RX ADMIN — HYDROMORPHONE HYDROCHLORIDE 0.5 MG: 1 INJECTION, SOLUTION INTRAMUSCULAR; INTRAVENOUS; SUBCUTANEOUS at 19:07

## 2021-09-09 RX ADMIN — DOCUSATE SODIUM 100 MG: 50 LIQUID ORAL at 05:49

## 2021-09-09 RX ADMIN — HYDROMORPHONE HYDROCHLORIDE 0.5 MG: 1 INJECTION, SOLUTION INTRAMUSCULAR; INTRAVENOUS; SUBCUTANEOUS at 00:58

## 2021-09-09 RX ADMIN — OXYCODONE HYDROCHLORIDE 10 MG: 10 TABLET ORAL at 14:45

## 2021-09-09 RX ADMIN — POTASSIUM BICARBONATE 50 MEQ: 978 TABLET, EFFERVESCENT ORAL at 08:51

## 2021-09-09 RX ADMIN — FUROSEMIDE 40 MG: 10 INJECTION, SOLUTION INTRAMUSCULAR; INTRAVENOUS at 16:34

## 2021-09-09 RX ADMIN — HALOPERIDOL LACTATE 5 MG: 5 INJECTION, SOLUTION INTRAMUSCULAR at 20:52

## 2021-09-09 RX ADMIN — OXYCODONE HYDROCHLORIDE 10 MG: 10 TABLET ORAL at 11:45

## 2021-09-09 RX ADMIN — FAMOTIDINE 20 MG: 20 TABLET ORAL at 16:33

## 2021-09-09 RX ADMIN — POTASSIUM BICARBONATE 50 MEQ: 978 TABLET, EFFERVESCENT ORAL at 16:33

## 2021-09-09 RX ADMIN — ENOXAPARIN SODIUM 30 MG: 30 INJECTION SUBCUTANEOUS at 16:34

## 2021-09-09 RX ADMIN — QUETIAPINE FUMARATE 100 MG: 100 TABLET ORAL at 16:34

## 2021-09-09 RX ADMIN — ENOXAPARIN SODIUM 30 MG: 30 INJECTION SUBCUTANEOUS at 05:49

## 2021-09-09 RX ADMIN — OXYCODONE HYDROCHLORIDE 10 MG: 10 TABLET ORAL at 08:51

## 2021-09-09 RX ADMIN — QUETIAPINE FUMARATE 100 MG: 100 TABLET ORAL at 05:49

## 2021-09-09 RX ADMIN — SENNOSIDES AND DOCUSATE SODIUM 1 TABLET: 50; 8.6 TABLET ORAL at 20:26

## 2021-09-09 ASSESSMENT — PATIENT HEALTH QUESTIONNAIRE - PHQ9
1. LITTLE INTEREST OR PLEASURE IN DOING THINGS: NOT AT ALL
2. FEELING DOWN, DEPRESSED, IRRITABLE, OR HOPELESS: NOT AT ALL
SUM OF ALL RESPONSES TO PHQ9 QUESTIONS 1 AND 2: 0

## 2021-09-09 ASSESSMENT — ENCOUNTER SYMPTOMS
VOMITING: 0
BACK PAIN: 0
COUGH: 0
NEUROLOGICAL NEGATIVE: 1
ARTHRALGIAS: 0
CONSTIPATION: 0
WHEEZING: 0
NECK PAIN: 0
NAUSEA: 0
ABDOMINAL DISTENTION: 0
ABDOMINAL PAIN: 1
MYALGIAS: 1
SHORTNESS OF BREATH: 0

## 2021-09-09 ASSESSMENT — PAIN DESCRIPTION - PAIN TYPE
TYPE: ACUTE PAIN

## 2021-09-09 NOTE — PROGRESS NOTES
4 Eyes Skin Assessment Completed by TERESE Ron and TERESE Davidson.    Head WDL    Ears WDL  Nose Blanching , Left nostril - optifoam thin around cortrak, mepilex placed on front part of mica  Mouth WDL  Neck WDL  Breast/Chest chest tube right side  Shoulder Blades WDL  Spine abrasion  (R) Arm/Elbow/Hand bruising  (L) Arm/Elbow/Hand bruising   Abdomen Incision - staples, RUDI Right side  Groin WDL  Scrotum/Coccyx/Buttocks Blanching, mepilex  (R) Leg Scar, Scab and Abrasion  (L) Leg Scar, Scab and Abrasion  (R) Heel/Foot/Toe calloused  (L) Heel/Foot/Toe calloused        Devices In Places ECG, Blood Pressure Cuff, Pulse Ox, Gonzalez, SCD's, ET Tube, Cortrak, Central Line and Cervical Collar      Interventions In Place Pillows, Optifoam, Q2 Turns, Low Air Loss Mattress, ZFlo Pillow and Heels Loaded W/Pillows, mepilex to mica, optifoam thin around cortrak    Possible Skin Injury No    Pictures Uploaded Into Epic yes  Wound Consult Placed N/A  RN Wound Prevention Protocol Ordered No

## 2021-09-09 NOTE — CONSULTS
BRIEF INPATIENT BEHAVIORAL HEALTH NOTE      Reviewed patient's chart for follow up on previous psychiatric consult.  Patient continues to be intubated.  Psych consult will be completed when patient is able to participate.      Raina Villavicencio [facility]  Behavioral Health Therapist

## 2021-09-09 NOTE — CARE PLAN
The patient is Watcher - Medium risk of patient condition declining or worsening    Shift Goals  Clinical Goals: Pain control  Patient Goals: Pain control  Family Goals: not family available     Progress made toward(s) clinical / shift goals:    Problem: Knowledge Deficit - Standard  Goal: Patient and family/care givers will demonstrate understanding of plan of care, disease process/condition, diagnostic tests and medications  Outcome: Progressing     Problem: Pain - Standard  Goal: Alleviation of pain or a reduction in pain to the patient’s comfort goal  Outcome: Progressing     Problem: Safety - Medical Restraint  Goal: Remains free of injury from restraints (Restraint for Interference with Medical Device)  Outcome: Progressing     Problem: Skin Integrity  Goal: Skin integrity is maintained or improved  Outcome: Progressing     Problem: Fall Risk  Goal: Patient will remain free from falls  Outcome: Progressing

## 2021-09-09 NOTE — DISCHARGE PLANNING
LSW asked for law enforcement assistance with locating additional demographic information on patient. Waiting response from traffic division.     Re-sent STAT requests to [facility]'s records department requesting any and all information on patient that they may have. Both fax confirmations stated complete.     Plan: Continue to locate NOK/family

## 2021-09-09 NOTE — DISCHARGE PLANNING
"Care Transition Team Assessment     LSW was informed by team that pt was extubated and appropriate for assessment. LSW met with pt at bedside (AOX3) and completed information for assessment. Patient reports that her legal name is Yris Edwards. Searched that name in epic will no success.  is correct.     Patient is a single 35 year old female with no children. Patient is homeless and reports staying at the \"[facility]\" on [address]. LSW inquired about family or friends for support yet pt shook her head \"no\" and shared that she's on her own and she makes friends \"along my way.\" LSW requested for pt to share what a typical day in her life looks like and pt shared that she does a lot of walking, exploring and meeting new people. Patient did disclose her addiction and abuse with alcohol and drugs. Pt refused to share which drugs she uses to LSW yet she shared that substances are apart of her daily life. LSW inquired about any connections or services used [facility] which pt denied.     No PCP or insurance. Patient does not work and reports no monthly income. LSW e-mailed [facility] to come speak with pt about screening for Medicaid or other services.     Patient asked LSW for a \"taxi ride\" back to the shelter. LSW explained to patient that she is not medically cleared and needs to remain in the hospital. She nodded yes to understanding the information and shared that she is hopeful she can return back on the streets.     Barriers to dc: Homeless, no insurance, no support system, substance abuse, and complex medically needs    Plan: Continue to assist with social/dc needs     Care Transition Team Assessment    Information Source  Orientation Level: Oriented to situation  Information Given By: Patient  Who is responsible for making decisions for patient? : Patient    Readmission Evaluation  Is this a readmission?: No    Elopement Risk  Legal Hold: No  Ambulatory or Self Mobile in Wheelchair: " No-Not an Elopement Risk  Elopement Risk: Not at Risk for Elopement    Interdisciplinary Discharge Planning  Primary Care Physician: none  Housing / Facility: Homeless    Discharge Preparedness  What is your plan after discharge?: Uncertain - pending medical team collaboration  What are your discharge supports?:  (None)  Prior Functional Level: Ambulatory, Independent with Activities of Daily Living, Independent with Medication Management    Functional Assesment  Prior Functional Level: Ambulatory, Independent with Activities of Daily Living, Independent with Medication Management    Finances  Financial Barriers to Discharge: Yes  Average Monthly Income: 0 $  Source of Income: None  Prescription Coverage: No  Prescription Coverage Comments: PFA to screen    Advance Directive  Advance Directive?: None    Psychological Assessment  History of Substance Abuse: Alcohol, Amphetamines, Methamphetamine  Substance Abuse Comments: Extensive SA history  History of Psychiatric Problems: No  Non-compliant with Treatment: No  Newly Diagnosed Illness: Yes    Discharge Risks or Barriers  Discharge risks or barriers?: No PCP, Uninsured / underinsured, Substance abuse, Mental health, Post-acute placement / services, Lives alone, no community support, Complex medical needs, Homeless / couch surfing    Anticipated Discharge Information  Discharge Disposition: Still a Patient

## 2021-09-09 NOTE — CARE PLAN
The patient is Watcher - Medium risk of patient condition declining or worsening    Shift Goals  Clinical Goals: Pain control  Patient Goals: Pain control  Family Goals: not family available       Problem: Pain - Standard  Goal: Alleviation of pain or a reduction in pain to the patient’s comfort goal  Outcome: Progressing     Problem: Skin Integrity  Goal: Skin integrity is maintained or improved  Outcome: Progressing     Problem: Hemodynamics  Goal: Patient's hemodynamics, fluid balance and neurologic status will be stable or improve  Outcome: Progressing     Problem: Respiratory  Goal: Patient will achieve/maintain optimum respiratory ventilation and gas exchange  Outcome: Progressing

## 2021-09-10 ENCOUNTER — APPOINTMENT (OUTPATIENT)
Dept: RADIOLOGY | Facility: MEDICAL CENTER | Age: 36
DRG: 957 | End: 2021-09-10
Attending: SURGERY
Payer: MEDICAID

## 2021-09-10 ENCOUNTER — APPOINTMENT (OUTPATIENT)
Dept: RADIOLOGY | Facility: MEDICAL CENTER | Age: 36
DRG: 957 | End: 2021-09-10
Attending: SPECIALIST
Payer: MEDICAID

## 2021-09-10 PROBLEM — S27.322A CONTUSION OF BOTH LUNGS: Status: RESOLVED | Noted: 2021-09-04 | Resolved: 2021-09-10

## 2021-09-10 PROBLEM — E87.20 LACTIC ACID ACIDOSIS: Status: RESOLVED | Noted: 2021-09-04 | Resolved: 2021-09-10

## 2021-09-10 PROBLEM — E83.51 HYPOCALCEMIA: Status: RESOLVED | Noted: 2021-09-04 | Resolved: 2021-09-10

## 2021-09-10 PROBLEM — R57.8 HEMORRHAGIC SHOCK (HCC): Status: RESOLVED | Noted: 2021-09-04 | Resolved: 2021-09-10

## 2021-09-10 PROBLEM — Z78.9 NO CONTRAINDICATION TO ANTICOAGULATION THERAPY: Status: ACTIVE | Noted: 2021-09-04

## 2021-09-10 PROBLEM — E87.70 FLUID OVERLOAD: Status: RESOLVED | Noted: 2021-09-07 | Resolved: 2021-09-10

## 2021-09-10 LAB
ALBUMIN SERPL BCP-MCNC: 2.1 G/DL (ref 3.2–4.9)
ALBUMIN/GLOB SERPL: 0.8 G/DL
ALP SERPL-CCNC: 75 U/L (ref 30–99)
ALT SERPL-CCNC: 44 U/L (ref 2–50)
ANION GAP SERPL CALC-SCNC: 7 MMOL/L (ref 7–16)
AST SERPL-CCNC: 47 U/L (ref 12–45)
BASOPHILS # BLD AUTO: 0.2 % (ref 0–1.8)
BASOPHILS # BLD: 0.02 K/UL (ref 0–0.12)
BILIRUB SERPL-MCNC: 0.2 MG/DL (ref 0.1–1.5)
BUN SERPL-MCNC: 17 MG/DL (ref 8–22)
CALCIUM SERPL-MCNC: 8.4 MG/DL (ref 8.5–10.5)
CHLORIDE SERPL-SCNC: 106 MMOL/L (ref 96–112)
CO2 SERPL-SCNC: 31 MMOL/L (ref 20–33)
CREAT SERPL-MCNC: 0.32 MG/DL (ref 0.5–1.4)
EOSINOPHIL # BLD AUTO: 0.09 K/UL (ref 0–0.51)
EOSINOPHIL NFR BLD: 1 % (ref 0–6.9)
ERYTHROCYTE [DISTWIDTH] IN BLOOD BY AUTOMATED COUNT: 57.9 FL (ref 35.9–50)
GLOBULIN SER CALC-MCNC: 2.6 G/DL (ref 1.9–3.5)
GLUCOSE SERPL-MCNC: 111 MG/DL (ref 65–99)
HCT VFR BLD AUTO: 29.8 % (ref 37–47)
HGB BLD-MCNC: 9.7 G/DL (ref 12–16)
IMM GRANULOCYTES # BLD AUTO: 0.08 K/UL (ref 0–0.11)
IMM GRANULOCYTES NFR BLD AUTO: 0.9 % (ref 0–0.9)
LYMPHOCYTES # BLD AUTO: 0.94 K/UL (ref 1–4.8)
LYMPHOCYTES NFR BLD: 10.3 % (ref 22–41)
MCH RBC QN AUTO: 30.7 PG (ref 27–33)
MCHC RBC AUTO-ENTMCNC: 32.6 G/DL (ref 33.6–35)
MCV RBC AUTO: 94.3 FL (ref 81.4–97.8)
MONOCYTES # BLD AUTO: 0.56 K/UL (ref 0–0.85)
MONOCYTES NFR BLD AUTO: 6.1 % (ref 0–13.4)
NEUTROPHILS # BLD AUTO: 7.48 K/UL (ref 2–7.15)
NEUTROPHILS NFR BLD: 81.5 % (ref 44–72)
NRBC # BLD AUTO: 0 K/UL
NRBC BLD-RTO: 0 /100 WBC
PLATELET # BLD AUTO: 191 K/UL (ref 164–446)
PMV BLD AUTO: 10.3 FL (ref 9–12.9)
POTASSIUM SERPL-SCNC: 3.3 MMOL/L (ref 3.6–5.5)
PROT SERPL-MCNC: 4.7 G/DL (ref 6–8.2)
RBC # BLD AUTO: 3.16 M/UL (ref 4.2–5.4)
SODIUM SERPL-SCNC: 144 MMOL/L (ref 135–145)
WBC # BLD AUTO: 9.2 K/UL (ref 4.8–10.8)

## 2021-09-10 PROCEDURE — A9270 NON-COVERED ITEM OR SERVICE: HCPCS | Performed by: SURGERY

## 2021-09-10 PROCEDURE — 700111 HCHG RX REV CODE 636 W/ 250 OVERRIDE (IP): Performed by: SURGERY

## 2021-09-10 PROCEDURE — 85025 COMPLETE CBC W/AUTO DIFF WBC: CPT

## 2021-09-10 PROCEDURE — A9270 NON-COVERED ITEM OR SERVICE: HCPCS | Performed by: SPECIALIST

## 2021-09-10 PROCEDURE — 71045 X-RAY EXAM CHEST 1 VIEW: CPT

## 2021-09-10 PROCEDURE — 700102 HCHG RX REV CODE 250 W/ 637 OVERRIDE(OP): Performed by: SURGERY

## 2021-09-10 PROCEDURE — 700102 HCHG RX REV CODE 250 W/ 637 OVERRIDE(OP): Performed by: SPECIALIST

## 2021-09-10 PROCEDURE — 770022 HCHG ROOM/CARE - ICU (200)

## 2021-09-10 PROCEDURE — 99233 SBSQ HOSP IP/OBS HIGH 50: CPT | Performed by: SURGERY

## 2021-09-10 PROCEDURE — 80053 COMPREHEN METABOLIC PANEL: CPT

## 2021-09-10 RX ORDER — MAGNESIUM SULFATE HEPTAHYDRATE 40 MG/ML
2 INJECTION, SOLUTION INTRAVENOUS ONCE
Status: COMPLETED | OUTPATIENT
Start: 2021-09-10 | End: 2021-09-10

## 2021-09-10 RX ADMIN — FAMOTIDINE 20 MG: 20 TABLET ORAL at 17:00

## 2021-09-10 RX ADMIN — POTASSIUM BICARBONATE 50 MEQ: 978 TABLET, EFFERVESCENT ORAL at 17:01

## 2021-09-10 RX ADMIN — MAGNESIUM SULFATE IN WATER 2 G: 40 INJECTION, SOLUTION INTRAVENOUS at 16:59

## 2021-09-10 RX ADMIN — MIDAZOLAM HYDROCHLORIDE 3 MG: 1 INJECTION, SOLUTION INTRAMUSCULAR; INTRAVENOUS at 01:16

## 2021-09-10 RX ADMIN — ENOXAPARIN SODIUM 30 MG: 30 INJECTION SUBCUTANEOUS at 05:08

## 2021-09-10 RX ADMIN — HYDROMORPHONE HYDROCHLORIDE 0.5 MG: 1 INJECTION, SOLUTION INTRAMUSCULAR; INTRAVENOUS; SUBCUTANEOUS at 05:26

## 2021-09-10 RX ADMIN — ACETAMINOPHEN 650 MG: 325 TABLET ORAL at 09:09

## 2021-09-10 RX ADMIN — OXYCODONE HYDROCHLORIDE 10 MG: 10 TABLET ORAL at 16:24

## 2021-09-10 RX ADMIN — ENOXAPARIN SODIUM 30 MG: 30 INJECTION SUBCUTANEOUS at 17:00

## 2021-09-10 RX ADMIN — FAMOTIDINE 20 MG: 20 TABLET ORAL at 05:08

## 2021-09-10 RX ADMIN — OXYCODONE HYDROCHLORIDE 10 MG: 10 TABLET ORAL at 23:31

## 2021-09-10 RX ADMIN — OXYCODONE HYDROCHLORIDE 10 MG: 10 TABLET ORAL at 09:09

## 2021-09-10 RX ADMIN — ACETAMINOPHEN 650 MG: 325 TABLET ORAL at 16:24

## 2021-09-10 RX ADMIN — POTASSIUM BICARBONATE 50 MEQ: 978 TABLET, EFFERVESCENT ORAL at 18:39

## 2021-09-10 RX ADMIN — MIDAZOLAM HYDROCHLORIDE 3 MG: 1 INJECTION, SOLUTION INTRAMUSCULAR; INTRAVENOUS at 12:22

## 2021-09-10 RX ADMIN — QUETIAPINE FUMARATE 100 MG: 100 TABLET ORAL at 17:01

## 2021-09-10 RX ADMIN — QUETIAPINE FUMARATE 100 MG: 100 TABLET ORAL at 05:07

## 2021-09-10 RX ADMIN — POTASSIUM BICARBONATE 50 MEQ: 978 TABLET, EFFERVESCENT ORAL at 05:07

## 2021-09-10 ASSESSMENT — PAIN DESCRIPTION - PAIN TYPE
TYPE: ACUTE PAIN

## 2021-09-10 ASSESSMENT — FIBROSIS 4 INDEX: FIB4 SCORE: 1.26
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
## 2021-09-10 NOTE — PROGRESS NOTES
12 hour chart check completed.   Refill request for vyvanse.  Filled #30 5/4/18  Seen 3/28/18; next appt 6/18/18  Please advise.

## 2021-09-10 NOTE — PROGRESS NOTES
Per Neurosurgery, no flexion/extension xrays. C-collar to be worn for 6 weeks, Pt to f/u in 6 weeks
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
## 2021-09-10 NOTE — PROGRESS NOTES
4 Eyes Skin Assessment Completed by TERESE Bates and Fernie RN.    Head WDL  Ears WDL  Nose Redness and Blanching  Mouth WDL  Neck WDL  Breast/Chest Redness and Incision  Shoulder Blades WDL  Spine abrasion   (R) Arm/Elbow/Hand Bruising  (L) Arm/Elbow/Hand Bruising  Abdomen Incision  Groin WDL  Scrotum/Coccyx/Buttocks Redness and Blanching  (R) Leg Scar, Scab and Abrasion  (L) Leg Scar, Scab and Abrasion  (R) Heel/Foot/Toe calloused  (L) Heel/Foot/Toe calloused          Devices In Places ECG, Tele Box, Pulse Ox, Gonzalez, SCD's and Cortrak      Interventions In Place Gray Ear Foams, Sacral Mepilex, Pillows, Q2 Turns, Barrier Cream, ZFlo Pillow, Heels Loaded W/Pillows and Pressure Redistribution Mattress    Possible Skin Injury No    Pictures Uploaded Into Epic N/A  Wound Consult Placed N/A  RN Wound Prevention Protocol Ordered Yes

## 2021-09-10 NOTE — CARE PLAN
The patient is Stable - Low risk of patient condition declining or worsening    Shift Goals  Clinical Goals: keep calm and safe  Patient Goals: pain control  Family Goals: no family at bedside    Progress made toward(s) clinical / shift goals:    Problem: Pain - Standard  Goal: Alleviation of pain or a reduction in pain to the patient’s comfort goal  Outcome: Progressing     Problem: Safety - Medical Restraint  Goal: Remains free of injury from restraints (Restraint for Interference with Medical Device)  Outcome: Met  Goal: Free from restraint(s) (Restraint for Interference with Medical Device)  Outcome: Met     Problem: Skin Integrity  Goal: Skin integrity is maintained or improved  Outcome: Progressing     Problem: Fall Risk  Goal: Patient will remain free from falls  Outcome: Progressing       Patient is not progressing towards the following goals:      Problem: Psychosocial  Goal: Patient's level of anxiety will decrease  Outcome: Not Progressing

## 2021-09-10 NOTE — CARE PLAN
The patient is Watcher - Medium risk of patient condition declining or worsening    Shift Goals  Clinical Goals: keep calm and safe  Patient Goals: pain control  Family Goals: no family at bedside    Progress made toward(s) clinical / shift goals:    Problem: Knowledge Deficit - Standard  Goal: Patient and family/care givers will demonstrate understanding of plan of care, disease process/condition, diagnostic tests and medications  Outcome: Progressing  Educated on POC     Problem: Pain - Standard  Goal: Alleviation of pain or a reduction in pain to the patient’s comfort goal  Outcome: Progressing  Pt's pain will decrease to comfort goal through rest, repositioning, a quiet environment, and PRN pharmacologic analgesia.

## 2021-09-11 ENCOUNTER — APPOINTMENT (OUTPATIENT)
Dept: RADIOLOGY | Facility: MEDICAL CENTER | Age: 36
DRG: 957 | End: 2021-09-11
Attending: SPECIALIST
Payer: MEDICAID

## 2021-09-11 LAB
ALBUMIN SERPL BCP-MCNC: 2.4 G/DL (ref 3.2–4.9)
ALBUMIN/GLOB SERPL: 0.7 G/DL
ALP SERPL-CCNC: 88 U/L (ref 30–99)
ALT SERPL-CCNC: 43 U/L (ref 2–50)
ANION GAP SERPL CALC-SCNC: 7 MMOL/L (ref 7–16)
AST SERPL-CCNC: 43 U/L (ref 12–45)
BASOPHILS # BLD AUTO: 0.2 % (ref 0–1.8)
BASOPHILS # BLD: 0.02 K/UL (ref 0–0.12)
BILIRUB SERPL-MCNC: 0.2 MG/DL (ref 0.1–1.5)
BUN SERPL-MCNC: 18 MG/DL (ref 8–22)
CALCIUM SERPL-MCNC: 8.9 MG/DL (ref 8.5–10.5)
CHLORIDE SERPL-SCNC: 105 MMOL/L (ref 96–112)
CO2 SERPL-SCNC: 30 MMOL/L (ref 20–33)
CREAT SERPL-MCNC: 0.3 MG/DL (ref 0.5–1.4)
EOSINOPHIL # BLD AUTO: 0.22 K/UL (ref 0–0.51)
EOSINOPHIL NFR BLD: 2.1 % (ref 0–6.9)
ERYTHROCYTE [DISTWIDTH] IN BLOOD BY AUTOMATED COUNT: 59.9 FL (ref 35.9–50)
GLOBULIN SER CALC-MCNC: 3.4 G/DL (ref 1.9–3.5)
GLUCOSE SERPL-MCNC: 122 MG/DL (ref 65–99)
HCT VFR BLD AUTO: 34.5 % (ref 37–47)
HGB BLD-MCNC: 11 G/DL (ref 12–16)
IMM GRANULOCYTES # BLD AUTO: 0.06 K/UL (ref 0–0.11)
IMM GRANULOCYTES NFR BLD AUTO: 0.6 % (ref 0–0.9)
LYMPHOCYTES # BLD AUTO: 0.85 K/UL (ref 1–4.8)
LYMPHOCYTES NFR BLD: 8.2 % (ref 22–41)
MCH RBC QN AUTO: 30.9 PG (ref 27–33)
MCHC RBC AUTO-ENTMCNC: 31.9 G/DL (ref 33.6–35)
MCV RBC AUTO: 96.9 FL (ref 81.4–97.8)
MONOCYTES # BLD AUTO: 0.54 K/UL (ref 0–0.85)
MONOCYTES NFR BLD AUTO: 5.2 % (ref 0–13.4)
NEUTROPHILS # BLD AUTO: 8.63 K/UL (ref 2–7.15)
NEUTROPHILS NFR BLD: 83.7 % (ref 44–72)
NRBC # BLD AUTO: 0 K/UL
NRBC BLD-RTO: 0 /100 WBC
PLATELET # BLD AUTO: 280 K/UL (ref 164–446)
PMV BLD AUTO: 9.9 FL (ref 9–12.9)
POTASSIUM SERPL-SCNC: 4.2 MMOL/L (ref 3.6–5.5)
PROT SERPL-MCNC: 5.8 G/DL (ref 6–8.2)
RBC # BLD AUTO: 3.56 M/UL (ref 4.2–5.4)
SODIUM SERPL-SCNC: 142 MMOL/L (ref 135–145)
WBC # BLD AUTO: 10.3 K/UL (ref 4.8–10.8)

## 2021-09-11 PROCEDURE — 700111 HCHG RX REV CODE 636 W/ 250 OVERRIDE (IP): Performed by: SURGERY

## 2021-09-11 PROCEDURE — A9270 NON-COVERED ITEM OR SERVICE: HCPCS | Performed by: SURGERY

## 2021-09-11 PROCEDURE — 71045 X-RAY EXAM CHEST 1 VIEW: CPT

## 2021-09-11 PROCEDURE — 700102 HCHG RX REV CODE 250 W/ 637 OVERRIDE(OP): Performed by: SURGERY

## 2021-09-11 PROCEDURE — 700102 HCHG RX REV CODE 250 W/ 637 OVERRIDE(OP): Performed by: SPECIALIST

## 2021-09-11 PROCEDURE — A9270 NON-COVERED ITEM OR SERVICE: HCPCS | Performed by: SPECIALIST

## 2021-09-11 PROCEDURE — 80053 COMPREHEN METABOLIC PANEL: CPT

## 2021-09-11 PROCEDURE — 99233 SBSQ HOSP IP/OBS HIGH 50: CPT | Performed by: SURGERY

## 2021-09-11 PROCEDURE — 94669 MECHANICAL CHEST WALL OSCILL: CPT

## 2021-09-11 PROCEDURE — 85025 COMPLETE CBC W/AUTO DIFF WBC: CPT

## 2021-09-11 PROCEDURE — 770001 HCHG ROOM/CARE - MED/SURG/GYN PRIV*

## 2021-09-11 RX ORDER — ALPRAZOLAM 0.25 MG/1
0.25 TABLET ORAL EVERY 6 HOURS PRN
Status: DISCONTINUED | OUTPATIENT
Start: 2021-09-11 | End: 2021-09-12

## 2021-09-11 RX ORDER — HYDROMORPHONE HYDROCHLORIDE 1 MG/ML
0.5 INJECTION, SOLUTION INTRAMUSCULAR; INTRAVENOUS; SUBCUTANEOUS
Status: DISCONTINUED | OUTPATIENT
Start: 2021-09-11 | End: 2021-09-12

## 2021-09-11 RX ORDER — FUROSEMIDE 10 MG/ML
20 INJECTION INTRAMUSCULAR; INTRAVENOUS ONCE
Status: COMPLETED | OUTPATIENT
Start: 2021-09-11 | End: 2021-09-11

## 2021-09-11 RX ADMIN — ENOXAPARIN SODIUM 30 MG: 30 INJECTION SUBCUTANEOUS at 05:10

## 2021-09-11 RX ADMIN — OXYCODONE 5 MG: 5 TABLET ORAL at 20:54

## 2021-09-11 RX ADMIN — FAMOTIDINE 20 MG: 20 TABLET ORAL at 17:32

## 2021-09-11 RX ADMIN — OXYCODONE HYDROCHLORIDE 10 MG: 10 TABLET ORAL at 03:41

## 2021-09-11 RX ADMIN — OXYCODONE HYDROCHLORIDE 10 MG: 10 TABLET ORAL at 14:23

## 2021-09-11 RX ADMIN — OXYCODONE HYDROCHLORIDE 10 MG: 10 TABLET ORAL at 11:11

## 2021-09-11 RX ADMIN — QUETIAPINE FUMARATE 100 MG: 100 TABLET ORAL at 05:00

## 2021-09-11 RX ADMIN — QUETIAPINE FUMARATE 100 MG: 100 TABLET ORAL at 17:32

## 2021-09-11 RX ADMIN — MIDAZOLAM HYDROCHLORIDE 3 MG: 1 INJECTION, SOLUTION INTRAMUSCULAR; INTRAVENOUS at 08:09

## 2021-09-11 RX ADMIN — ENOXAPARIN SODIUM 30 MG: 30 INJECTION SUBCUTANEOUS at 17:31

## 2021-09-11 RX ADMIN — FUROSEMIDE 20 MG: 10 INJECTION, SOLUTION INTRAMUSCULAR; INTRAVENOUS at 09:49

## 2021-09-11 RX ADMIN — FAMOTIDINE 20 MG: 20 TABLET ORAL at 05:00

## 2021-09-11 ASSESSMENT — COGNITIVE AND FUNCTIONAL STATUS - GENERAL
DRESSING REGULAR UPPER BODY CLOTHING: A LITTLE
MOBILITY SCORE: 17
SUGGESTED CMS G CODE MODIFIER MOBILITY: CK
SUGGESTED CMS G CODE MODIFIER DAILY ACTIVITY: CK
CLIMB 3 TO 5 STEPS WITH RAILING: A LOT
MOVING FROM LYING ON BACK TO SITTING ON SIDE OF FLAT BED: A LITTLE
STANDING UP FROM CHAIR USING ARMS: A LITTLE
TOILETING: A LITTLE
PERSONAL GROOMING: A LITTLE
WALKING IN HOSPITAL ROOM: A LITTLE
HELP NEEDED FOR BATHING: A LITTLE
DRESSING REGULAR LOWER BODY CLOTHING: A LITTLE
MOVING TO AND FROM BED TO CHAIR: A LITTLE
TURNING FROM BACK TO SIDE WHILE IN FLAT BAD: A LITTLE
EATING MEALS: A LITTLE
DAILY ACTIVITIY SCORE: 18

## 2021-09-11 ASSESSMENT — LIFESTYLE VARIABLES
ALCOHOL_USE: YES
TOTAL SCORE: 0
TOTAL SCORE: 0
DOES PATIENT WANT TO STOP DRINKING: NO
HOW MANY TIMES IN THE PAST YEAR HAVE YOU HAD 5 OR MORE DRINKS IN A DAY: 0
AVERAGE NUMBER OF DAYS PER WEEK YOU HAVE A DRINK CONTAINING ALCOHOL: 3
EVER FELT BAD OR GUILTY ABOUT YOUR DRINKING: NO
HAVE PEOPLE ANNOYED YOU BY CRITICIZING YOUR DRINKING: NO
HAVE YOU EVER FELT YOU SHOULD CUT DOWN ON YOUR DRINKING: NO
CONSUMPTION TOTAL: POSITIVE
EVER HAD A DRINK FIRST THING IN THE MORNING TO STEADY YOUR NERVES TO GET RID OF A HANGOVER: NO
ON A TYPICAL DAY WHEN YOU DRINK ALCOHOL HOW MANY DRINKS DO YOU HAVE: 4
TOTAL SCORE: 0

## 2021-09-11 ASSESSMENT — PAIN DESCRIPTION - PAIN TYPE
TYPE: ACUTE PAIN

## 2021-09-11 NOTE — PROGRESS NOTES
Report given to TERESE Kang. Patient transported via wheelchair to [facility].

## 2021-09-11 NOTE — PROGRESS NOTES
IDT AM rounds. Orders received for diet, d/c lopez , and lasix. Updated on output from RUDI of 730 ml brown serous.

## 2021-09-11 NOTE — CARE PLAN
The patient is Watcher - Medium risk of patient condition declining or worsening    Shift Goals  Clinical Goals: Monitor Respiratory status, encourage IS  Patient Goals: Rest  Family Goals: NA      Problem: Knowledge Deficit - Standard  Goal: Patient and family/care givers will demonstrate understanding of plan of care, disease process/condition, diagnostic tests and medications  Outcome: Progressing     Problem: Pain - Standard  Goal: Alleviation of pain or a reduction in pain to the patient’s comfort goal  Outcome: Progressing     Problem: Skin Integrity  Goal: Skin integrity is maintained or improved  Outcome: Progressing

## 2021-09-12 ENCOUNTER — APPOINTMENT (OUTPATIENT)
Dept: RADIOLOGY | Facility: MEDICAL CENTER | Age: 36
DRG: 957 | End: 2021-09-12
Attending: SPECIALIST
Payer: MEDICAID

## 2021-09-12 PROBLEM — Z78.9 ALCOHOL USE: Status: RESOLVED | Noted: 2021-09-04 | Resolved: 2021-09-12

## 2021-09-12 PROBLEM — Z02.9 DISCHARGE PLANNING ISSUES: Status: ACTIVE | Noted: 2021-09-12

## 2021-09-12 PROBLEM — J94.2 HEMOTHORAX: Status: RESOLVED | Noted: 2021-09-04 | Resolved: 2021-09-12

## 2021-09-12 PROBLEM — Z11.52 ENCOUNTER FOR SCREENING FOR COVID-19: Status: RESOLVED | Noted: 2021-09-04 | Resolved: 2021-09-12

## 2021-09-12 PROBLEM — J95.821 ACUTE RESPIRATORY FAILURE FOLLOWING TRAUMA AND SURGERY (HCC): Status: RESOLVED | Noted: 2021-09-04 | Resolved: 2021-09-12

## 2021-09-12 LAB
ALBUMIN SERPL BCP-MCNC: 2.4 G/DL (ref 3.2–4.9)
ALBUMIN/GLOB SERPL: 0.8 G/DL
ALP SERPL-CCNC: 83 U/L (ref 30–99)
ALT SERPL-CCNC: 34 U/L (ref 2–50)
ANION GAP SERPL CALC-SCNC: 10 MMOL/L (ref 7–16)
AST SERPL-CCNC: 38 U/L (ref 12–45)
BASOPHILS # BLD AUTO: 0.2 % (ref 0–1.8)
BASOPHILS # BLD: 0.02 K/UL (ref 0–0.12)
BILIRUB SERPL-MCNC: 0.3 MG/DL (ref 0.1–1.5)
BUN SERPL-MCNC: 13 MG/DL (ref 8–22)
CALCIUM SERPL-MCNC: 8.5 MG/DL (ref 8.5–10.5)
CHLORIDE SERPL-SCNC: 100 MMOL/L (ref 96–112)
CO2 SERPL-SCNC: 29 MMOL/L (ref 20–33)
CREAT SERPL-MCNC: 0.3 MG/DL (ref 0.5–1.4)
EOSINOPHIL # BLD AUTO: 0.26 K/UL (ref 0–0.51)
EOSINOPHIL NFR BLD: 3.2 % (ref 0–6.9)
ERYTHROCYTE [DISTWIDTH] IN BLOOD BY AUTOMATED COUNT: 58.7 FL (ref 35.9–50)
GLOBULIN SER CALC-MCNC: 2.9 G/DL (ref 1.9–3.5)
GLUCOSE SERPL-MCNC: 91 MG/DL (ref 65–99)
HCT VFR BLD AUTO: 33.9 % (ref 37–47)
HGB BLD-MCNC: 10.6 G/DL (ref 12–16)
IMM GRANULOCYTES # BLD AUTO: 0.02 K/UL (ref 0–0.11)
IMM GRANULOCYTES NFR BLD AUTO: 0.2 % (ref 0–0.9)
LYMPHOCYTES # BLD AUTO: 0.97 K/UL (ref 1–4.8)
LYMPHOCYTES NFR BLD: 11.8 % (ref 22–41)
MCH RBC QN AUTO: 30.2 PG (ref 27–33)
MCHC RBC AUTO-ENTMCNC: 31.3 G/DL (ref 33.6–35)
MCV RBC AUTO: 96.6 FL (ref 81.4–97.8)
MONOCYTES # BLD AUTO: 0.47 K/UL (ref 0–0.85)
MONOCYTES NFR BLD AUTO: 5.7 % (ref 0–13.4)
NEUTROPHILS # BLD AUTO: 6.48 K/UL (ref 2–7.15)
NEUTROPHILS NFR BLD: 78.9 % (ref 44–72)
NRBC # BLD AUTO: 0 K/UL
NRBC BLD-RTO: 0 /100 WBC
PLATELET # BLD AUTO: 315 K/UL (ref 164–446)
PMV BLD AUTO: 10 FL (ref 9–12.9)
POTASSIUM SERPL-SCNC: 3.7 MMOL/L (ref 3.6–5.5)
PROT SERPL-MCNC: 5.3 G/DL (ref 6–8.2)
RBC # BLD AUTO: 3.51 M/UL (ref 4.2–5.4)
SODIUM SERPL-SCNC: 139 MMOL/L (ref 135–145)
TRIGL SERPL-MCNC: 120 MG/DL (ref 0–149)
WBC # BLD AUTO: 8.2 K/UL (ref 4.8–10.8)

## 2021-09-12 PROCEDURE — A9270 NON-COVERED ITEM OR SERVICE: HCPCS | Performed by: SURGERY

## 2021-09-12 PROCEDURE — 700102 HCHG RX REV CODE 250 W/ 637 OVERRIDE(OP): Performed by: SURGERY

## 2021-09-12 PROCEDURE — 99232 SBSQ HOSP IP/OBS MODERATE 35: CPT | Performed by: SURGERY

## 2021-09-12 PROCEDURE — 770001 HCHG ROOM/CARE - MED/SURG/GYN PRIV*

## 2021-09-12 PROCEDURE — 80053 COMPREHEN METABOLIC PANEL: CPT

## 2021-09-12 PROCEDURE — 700102 HCHG RX REV CODE 250 W/ 637 OVERRIDE(OP): Performed by: SPECIALIST

## 2021-09-12 PROCEDURE — 85025 COMPLETE CBC W/AUTO DIFF WBC: CPT

## 2021-09-12 PROCEDURE — 700111 HCHG RX REV CODE 636 W/ 250 OVERRIDE (IP): Performed by: SURGERY

## 2021-09-12 PROCEDURE — 84478 ASSAY OF TRIGLYCERIDES: CPT

## 2021-09-12 PROCEDURE — 700102 HCHG RX REV CODE 250 W/ 637 OVERRIDE(OP): Performed by: NURSE PRACTITIONER

## 2021-09-12 PROCEDURE — 36415 COLL VENOUS BLD VENIPUNCTURE: CPT

## 2021-09-12 PROCEDURE — A9270 NON-COVERED ITEM OR SERVICE: HCPCS | Performed by: NURSE PRACTITIONER

## 2021-09-12 PROCEDURE — A9270 NON-COVERED ITEM OR SERVICE: HCPCS | Performed by: SPECIALIST

## 2021-09-12 PROCEDURE — 94669 MECHANICAL CHEST WALL OSCILL: CPT

## 2021-09-12 PROCEDURE — 71045 X-RAY EXAM CHEST 1 VIEW: CPT

## 2021-09-12 RX ORDER — AMOXICILLIN 250 MG
1 CAPSULE ORAL NIGHTLY
Status: DISCONTINUED | OUTPATIENT
Start: 2021-09-12 | End: 2021-10-04 | Stop reason: HOSPADM

## 2021-09-12 RX ORDER — ACETAMINOPHEN 325 MG/1
650 TABLET ORAL EVERY 4 HOURS PRN
Status: DISCONTINUED | OUTPATIENT
Start: 2021-09-12 | End: 2021-09-25

## 2021-09-12 RX ORDER — DOCUSATE SODIUM 50 MG/5ML
100 LIQUID ORAL 2 TIMES DAILY
Status: DISCONTINUED | OUTPATIENT
Start: 2021-09-12 | End: 2021-09-13

## 2021-09-12 RX ORDER — ACETAMINOPHEN 650 MG/1
650 SUPPOSITORY RECTAL EVERY 4 HOURS PRN
Status: DISCONTINUED | OUTPATIENT
Start: 2021-09-12 | End: 2021-09-25

## 2021-09-12 RX ORDER — ONDANSETRON 4 MG/1
4 TABLET, ORALLY DISINTEGRATING ORAL EVERY 4 HOURS PRN
Status: DISCONTINUED | OUTPATIENT
Start: 2021-09-12 | End: 2021-10-04 | Stop reason: HOSPADM

## 2021-09-12 RX ORDER — POLYETHYLENE GLYCOL 3350 17 G/17G
1 POWDER, FOR SOLUTION ORAL 2 TIMES DAILY
Status: DISCONTINUED | OUTPATIENT
Start: 2021-09-12 | End: 2021-10-04 | Stop reason: HOSPADM

## 2021-09-12 RX ORDER — ALPRAZOLAM 0.25 MG/1
0.25 TABLET ORAL EVERY 6 HOURS PRN
Status: DISCONTINUED | OUTPATIENT
Start: 2021-09-12 | End: 2021-10-04 | Stop reason: HOSPADM

## 2021-09-12 RX ORDER — OXYCODONE HYDROCHLORIDE 5 MG/1
5 TABLET ORAL
Status: DISCONTINUED | OUTPATIENT
Start: 2021-09-12 | End: 2021-09-25

## 2021-09-12 RX ORDER — QUETIAPINE FUMARATE 100 MG/1
100 TABLET, FILM COATED ORAL 2 TIMES DAILY
Status: DISCONTINUED | OUTPATIENT
Start: 2021-09-12 | End: 2021-09-17

## 2021-09-12 RX ORDER — AMOXICILLIN 250 MG
1 CAPSULE ORAL
Status: DISCONTINUED | OUTPATIENT
Start: 2021-09-12 | End: 2021-10-04 | Stop reason: HOSPADM

## 2021-09-12 RX ADMIN — ACETAMINOPHEN 650 MG: 325 TABLET, FILM COATED ORAL at 21:34

## 2021-09-12 RX ADMIN — QUETIAPINE FUMARATE 100 MG: 100 TABLET ORAL at 04:35

## 2021-09-12 RX ADMIN — ENOXAPARIN SODIUM 30 MG: 30 INJECTION SUBCUTANEOUS at 17:43

## 2021-09-12 RX ADMIN — OXYCODONE 5 MG: 5 TABLET ORAL at 02:11

## 2021-09-12 RX ADMIN — ENOXAPARIN SODIUM 30 MG: 30 INJECTION SUBCUTANEOUS at 04:35

## 2021-09-12 RX ADMIN — DOCUSATE SODIUM 50 MG AND SENNOSIDES 8.6 MG 1 TABLET: 8.6; 5 TABLET, FILM COATED ORAL at 21:34

## 2021-09-12 RX ADMIN — FAMOTIDINE 20 MG: 20 TABLET ORAL at 04:35

## 2021-09-12 RX ADMIN — HALOPERIDOL LACTATE 5 MG: 5 INJECTION, SOLUTION INTRAMUSCULAR at 14:00

## 2021-09-12 RX ADMIN — OXYCODONE 5 MG: 5 TABLET ORAL at 21:34

## 2021-09-12 RX ADMIN — QUETIAPINE FUMARATE 100 MG: 100 TABLET ORAL at 17:43

## 2021-09-12 ASSESSMENT — PAIN DESCRIPTION - PAIN TYPE
TYPE: ACUTE PAIN

## 2021-09-12 ASSESSMENT — ENCOUNTER SYMPTOMS
CONSTIPATION: 0
SPEECH CHANGE: 0
MYALGIAS: 1
ABDOMINAL PAIN: 1
SHORTNESS OF BREATH: 1
FEVER: 0
CHILLS: 0
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
## 2021-09-12 NOTE — ASSESSMENT & PLAN NOTE
Date of admission: 9/4/2021.  Date: 9/12Transfer orders from SICU.  Date: 9/12 SNF referral.  Cleared for discharge: Yes - Date: 10/2.  Discharge delayed: No.  Discharge date: 10/4   -Sister to  patient and drive to [place] on Monday 10/4

## 2021-09-13 ENCOUNTER — APPOINTMENT (OUTPATIENT)
Dept: RADIOLOGY | Facility: MEDICAL CENTER | Age: 36
DRG: 957 | End: 2021-09-13
Attending: SPECIALIST
Payer: MEDICAID

## 2021-09-13 PROBLEM — J90 PLEURAL EFFUSION: Status: ACTIVE | Noted: 2021-09-13

## 2021-09-13 LAB
ALBUMIN SERPL BCP-MCNC: 2.5 G/DL (ref 3.2–4.9)
ALBUMIN/GLOB SERPL: 0.8 G/DL
ALP SERPL-CCNC: 86 U/L (ref 30–99)
ALT SERPL-CCNC: 30 U/L (ref 2–50)
ANION GAP SERPL CALC-SCNC: 8 MMOL/L (ref 7–16)
AST SERPL-CCNC: 30 U/L (ref 12–45)
BASOPHILS # BLD AUTO: 0.5 % (ref 0–1.8)
BASOPHILS # BLD: 0.04 K/UL (ref 0–0.12)
BILIRUB SERPL-MCNC: 0.3 MG/DL (ref 0.1–1.5)
BUN SERPL-MCNC: 11 MG/DL (ref 8–22)
CALCIUM SERPL-MCNC: 8.7 MG/DL (ref 8.5–10.5)
CHLORIDE SERPL-SCNC: 101 MMOL/L (ref 96–112)
CO2 SERPL-SCNC: 29 MMOL/L (ref 20–33)
CREAT SERPL-MCNC: 0.4 MG/DL (ref 0.5–1.4)
EOSINOPHIL # BLD AUTO: 0.21 K/UL (ref 0–0.51)
EOSINOPHIL NFR BLD: 2.5 % (ref 0–6.9)
ERYTHROCYTE [DISTWIDTH] IN BLOOD BY AUTOMATED COUNT: 58.9 FL (ref 35.9–50)
GLOBULIN SER CALC-MCNC: 3.2 G/DL (ref 1.9–3.5)
GLUCOSE SERPL-MCNC: 99 MG/DL (ref 65–99)
HCT VFR BLD AUTO: 35.3 % (ref 37–47)
HGB BLD-MCNC: 11 G/DL (ref 12–16)
IMM GRANULOCYTES # BLD AUTO: 0.02 K/UL (ref 0–0.11)
IMM GRANULOCYTES NFR BLD AUTO: 0.2 % (ref 0–0.9)
LYMPHOCYTES # BLD AUTO: 0.98 K/UL (ref 1–4.8)
LYMPHOCYTES NFR BLD: 11.7 % (ref 22–41)
MCH RBC QN AUTO: 30.1 PG (ref 27–33)
MCHC RBC AUTO-ENTMCNC: 31.2 G/DL (ref 33.6–35)
MCV RBC AUTO: 96.4 FL (ref 81.4–97.8)
MONOCYTES # BLD AUTO: 0.52 K/UL (ref 0–0.85)
MONOCYTES NFR BLD AUTO: 6.2 % (ref 0–13.4)
NEUTROPHILS # BLD AUTO: 6.63 K/UL (ref 2–7.15)
NEUTROPHILS NFR BLD: 78.9 % (ref 44–72)
NRBC # BLD AUTO: 0 K/UL
NRBC BLD-RTO: 0 /100 WBC
NT-PROBNP SERPL IA-MCNC: 104 PG/ML (ref 0–125)
PLATELET # BLD AUTO: 354 K/UL (ref 164–446)
PMV BLD AUTO: 9.6 FL (ref 9–12.9)
POTASSIUM SERPL-SCNC: 3.8 MMOL/L (ref 3.6–5.5)
PROT SERPL-MCNC: 5.7 G/DL (ref 6–8.2)
RBC # BLD AUTO: 3.66 M/UL (ref 4.2–5.4)
SODIUM SERPL-SCNC: 138 MMOL/L (ref 135–145)
WBC # BLD AUTO: 8.4 K/UL (ref 4.8–10.8)

## 2021-09-13 PROCEDURE — 93970 EXTREMITY STUDY: CPT | Mod: 26 | Performed by: INTERNAL MEDICINE

## 2021-09-13 PROCEDURE — 92523 SPEECH SOUND LANG COMPREHEN: CPT

## 2021-09-13 PROCEDURE — 97162 PT EVAL MOD COMPLEX 30 MIN: CPT

## 2021-09-13 PROCEDURE — 71045 X-RAY EXAM CHEST 1 VIEW: CPT

## 2021-09-13 PROCEDURE — 700102 HCHG RX REV CODE 250 W/ 637 OVERRIDE(OP): Performed by: NURSE PRACTITIONER

## 2021-09-13 PROCEDURE — 36415 COLL VENOUS BLD VENIPUNCTURE: CPT

## 2021-09-13 PROCEDURE — 83880 ASSAY OF NATRIURETIC PEPTIDE: CPT

## 2021-09-13 PROCEDURE — 700111 HCHG RX REV CODE 636 W/ 250 OVERRIDE (IP): Performed by: SURGERY

## 2021-09-13 PROCEDURE — 94669 MECHANICAL CHEST WALL OSCILL: CPT

## 2021-09-13 PROCEDURE — 770001 HCHG ROOM/CARE - MED/SURG/GYN PRIV*

## 2021-09-13 PROCEDURE — 99232 SBSQ HOSP IP/OBS MODERATE 35: CPT | Performed by: SURGERY

## 2021-09-13 PROCEDURE — 80053 COMPREHEN METABOLIC PANEL: CPT

## 2021-09-13 PROCEDURE — 93970 EXTREMITY STUDY: CPT

## 2021-09-13 PROCEDURE — 94760 N-INVAS EAR/PLS OXIMETRY 1: CPT

## 2021-09-13 PROCEDURE — 700111 HCHG RX REV CODE 636 W/ 250 OVERRIDE (IP): Performed by: NURSE PRACTITIONER

## 2021-09-13 PROCEDURE — 85025 COMPLETE CBC W/AUTO DIFF WBC: CPT

## 2021-09-13 PROCEDURE — A9270 NON-COVERED ITEM OR SERVICE: HCPCS | Performed by: NURSE PRACTITIONER

## 2021-09-13 RX ORDER — DOCUSATE SODIUM 100 MG/1
100 CAPSULE, LIQUID FILLED ORAL 2 TIMES DAILY
Status: DISCONTINUED | OUTPATIENT
Start: 2021-09-13 | End: 2021-10-04 | Stop reason: HOSPADM

## 2021-09-13 RX ORDER — FUROSEMIDE 10 MG/ML
20 INJECTION INTRAMUSCULAR; INTRAVENOUS ONCE
Status: COMPLETED | OUTPATIENT
Start: 2021-09-13 | End: 2021-09-13

## 2021-09-13 RX ADMIN — OXYCODONE 5 MG: 5 TABLET ORAL at 09:03

## 2021-09-13 RX ADMIN — DOCUSATE SODIUM 100 MG: 50 LIQUID ORAL at 04:54

## 2021-09-13 RX ADMIN — POTASSIUM BICARBONATE 25 MEQ: 978 TABLET, EFFERVESCENT ORAL at 09:03

## 2021-09-13 RX ADMIN — QUETIAPINE FUMARATE 100 MG: 100 TABLET ORAL at 17:37

## 2021-09-13 RX ADMIN — GUAIFENESIN 200 MG: 100 SOLUTION ORAL at 14:23

## 2021-09-13 RX ADMIN — FUROSEMIDE 20 MG: 10 INJECTION, SOLUTION INTRAMUSCULAR; INTRAVENOUS at 09:05

## 2021-09-13 RX ADMIN — OXYCODONE 5 MG: 5 TABLET ORAL at 20:12

## 2021-09-13 RX ADMIN — ENOXAPARIN SODIUM 30 MG: 30 INJECTION SUBCUTANEOUS at 17:37

## 2021-09-13 RX ADMIN — OXYCODONE 5 MG: 5 TABLET ORAL at 14:24

## 2021-09-13 RX ADMIN — QUETIAPINE FUMARATE 100 MG: 100 TABLET ORAL at 04:53

## 2021-09-13 RX ADMIN — GUAIFENESIN 200 MG: 100 SOLUTION ORAL at 09:19

## 2021-09-13 RX ADMIN — GUAIFENESIN 200 MG: 100 SOLUTION ORAL at 20:12

## 2021-09-13 RX ADMIN — ENOXAPARIN SODIUM 30 MG: 30 INJECTION SUBCUTANEOUS at 04:54

## 2021-09-13 ASSESSMENT — GAIT ASSESSMENTS
GAIT LEVEL OF ASSIST: SUPERVISED
DISTANCE (FEET): 200
DEVIATION: BRADYKINETIC

## 2021-09-13 ASSESSMENT — ENCOUNTER SYMPTOMS
CHILLS: 0
FEVER: 0
COUGH: 1
MYALGIAS: 1
ABDOMINAL PAIN: 1
SHORTNESS OF BREATH: 1
CONSTIPATION: 0
SPUTUM PRODUCTION: 1
SPEECH CHANGE: 0

## 2021-09-13 ASSESSMENT — PAIN DESCRIPTION - PAIN TYPE
TYPE: ACUTE PAIN

## 2021-09-13 ASSESSMENT — COGNITIVE AND FUNCTIONAL STATUS - GENERAL
MOBILITY SCORE: 21
WALKING IN HOSPITAL ROOM: A LITTLE
CLIMB 3 TO 5 STEPS WITH RAILING: A LITTLE
STANDING UP FROM CHAIR USING ARMS: A LITTLE
SUGGESTED CMS G CODE MODIFIER MOBILITY: CJ
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
## 2021-09-13 NOTE — CARE PLAN
Problem: Nutritional:  Goal: Achieve adequate nutritional intake  Description: Patient will consume 50% of meals  Outcome: Progressing   PO % for one meal thus far.

## 2021-09-13 NOTE — CARE PLAN
The patient is Stable - Low risk of patient condition declining or worsening    Shift Goals  Clinical Goals: (P) Wean O2   Patient Goals: (P) Rest  Family Goals: MENA    Progress made toward(s) clinical / shift goals:  Improved breathing noted since arrival to floor. IS demonstrated. Medicated for pain; see MAR. Patient slept through most of shift after arrival.     Patient is not progressing towards the following goals: NA.

## 2021-09-13 NOTE — PROGRESS NOTES
2 RN skin check complete.   Devices in place: [place] J collar, RUDI RLQ, IV left forearm, nasal cannula.  Skin assessed under devices above.  Confirmed pressure ulcers found on N/A.  New potential pressure ulcers noted on N/A.   Scattered bruises, scabs, and abrasions shoulder, left hip, bilateral shins, mid and low back.  Right lateral trunk incision approximated with sutures. Redressed with gauze and tegaderm.  Right lower quadrant RUDI drain with sutures covered with split gauze and hypafix tape.   Midline incision approximated with staples, open to air.  Top of left foot has healing scab, pt expressed concern.  Blanchable redness to sacrum.  Wound consult placed N/A  The following interventions in place: Nasal cannula silicone and grey foam. Mepilex beneath [place] J collar. Dressings were changed, CD&I. Letha cream applied to sacrum. Pillows for positioning.

## 2021-09-13 NOTE — DIETARY
Nutrition Services: Brief Update    Pt previously on tube feeding.  Pt refused Cortrak replacement 9/10.  Diet advanced to L6/L0 (soft and bite sized with thi liquds).  Per ADL flow sheet, PO % for most recent meal.    Recommendations/Plan:  1. Encourage intake of meals.  2. Document intake of all meals as % taken in ADLs to provide interdisciplinary communication across all shifts.   3. Monitor weight.  4. Nutrition rep will continue to see patient for ongoing meal and snack preferences.     RD will cont to follow.
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
## 2021-09-13 NOTE — ASSESSMENT & PLAN NOTE
9/13 New opacity of the right upper lobe, appearance suggests loculated pleural effusion. Afebrile.  -IPV, IV lasix, IS, mucinex.  9/14 Interval improvement of right upper lobe loculated effusion or lobar atelectasis but increased pulmonary edema.  -IV lasix repeated.   9/16 Effusion without resolution-- CT chest/abd/pelvis with increased loculating evolving right hemothorax.

## 2021-09-14 ENCOUNTER — APPOINTMENT (OUTPATIENT)
Dept: RADIOLOGY | Facility: MEDICAL CENTER | Age: 36
DRG: 957 | End: 2021-09-14
Attending: SPECIALIST
Payer: MEDICAID

## 2021-09-14 LAB
ANION GAP SERPL CALC-SCNC: 7 MMOL/L (ref 7–16)
BASOPHILS # BLD AUTO: 0.4 % (ref 0–1.8)
BASOPHILS # BLD: 0.04 K/UL (ref 0–0.12)
BUN SERPL-MCNC: 9 MG/DL (ref 8–22)
CALCIUM SERPL-MCNC: 8.9 MG/DL (ref 8.5–10.5)
CHLORIDE SERPL-SCNC: 103 MMOL/L (ref 96–112)
CO2 SERPL-SCNC: 26 MMOL/L (ref 20–33)
CREAT SERPL-MCNC: 0.33 MG/DL (ref 0.5–1.4)
EOSINOPHIL # BLD AUTO: 0.35 K/UL (ref 0–0.51)
EOSINOPHIL NFR BLD: 3.9 % (ref 0–6.9)
ERYTHROCYTE [DISTWIDTH] IN BLOOD BY AUTOMATED COUNT: 57.9 FL (ref 35.9–50)
GLUCOSE SERPL-MCNC: 104 MG/DL (ref 65–99)
HCT VFR BLD AUTO: 36.3 % (ref 37–47)
HGB BLD-MCNC: 11.6 G/DL (ref 12–16)
IMM GRANULOCYTES # BLD AUTO: 0.05 K/UL (ref 0–0.11)
IMM GRANULOCYTES NFR BLD AUTO: 0.6 % (ref 0–0.9)
LYMPHOCYTES # BLD AUTO: 1.06 K/UL (ref 1–4.8)
LYMPHOCYTES NFR BLD: 11.7 % (ref 22–41)
MCH RBC QN AUTO: 30.4 PG (ref 27–33)
MCHC RBC AUTO-ENTMCNC: 32 G/DL (ref 33.6–35)
MCV RBC AUTO: 95.3 FL (ref 81.4–97.8)
MONOCYTES # BLD AUTO: 0.6 K/UL (ref 0–0.85)
MONOCYTES NFR BLD AUTO: 6.6 % (ref 0–13.4)
NEUTROPHILS # BLD AUTO: 6.98 K/UL (ref 2–7.15)
NEUTROPHILS NFR BLD: 76.8 % (ref 44–72)
NRBC # BLD AUTO: 0 K/UL
NRBC BLD-RTO: 0 /100 WBC
PLATELET # BLD AUTO: 434 K/UL (ref 164–446)
PMV BLD AUTO: 9.9 FL (ref 9–12.9)
POTASSIUM SERPL-SCNC: 3.6 MMOL/L (ref 3.6–5.5)
RBC # BLD AUTO: 3.81 M/UL (ref 4.2–5.4)
SODIUM SERPL-SCNC: 136 MMOL/L (ref 135–145)
WBC # BLD AUTO: 9.1 K/UL (ref 4.8–10.8)

## 2021-09-14 PROCEDURE — 36415 COLL VENOUS BLD VENIPUNCTURE: CPT

## 2021-09-14 PROCEDURE — 700102 HCHG RX REV CODE 250 W/ 637 OVERRIDE(OP): Performed by: NURSE PRACTITIONER

## 2021-09-14 PROCEDURE — 85025 COMPLETE CBC W/AUTO DIFF WBC: CPT

## 2021-09-14 PROCEDURE — 97535 SELF CARE MNGMENT TRAINING: CPT

## 2021-09-14 PROCEDURE — 700111 HCHG RX REV CODE 636 W/ 250 OVERRIDE (IP): Performed by: NURSE PRACTITIONER

## 2021-09-14 PROCEDURE — 97166 OT EVAL MOD COMPLEX 45 MIN: CPT

## 2021-09-14 PROCEDURE — A9270 NON-COVERED ITEM OR SERVICE: HCPCS | Performed by: NURSE PRACTITIONER

## 2021-09-14 PROCEDURE — 99232 SBSQ HOSP IP/OBS MODERATE 35: CPT | Performed by: SURGERY

## 2021-09-14 PROCEDURE — 94669 MECHANICAL CHEST WALL OSCILL: CPT

## 2021-09-14 PROCEDURE — 770001 HCHG ROOM/CARE - MED/SURG/GYN PRIV*

## 2021-09-14 PROCEDURE — 80048 BASIC METABOLIC PNL TOTAL CA: CPT

## 2021-09-14 PROCEDURE — 71045 X-RAY EXAM CHEST 1 VIEW: CPT

## 2021-09-14 PROCEDURE — 700111 HCHG RX REV CODE 636 W/ 250 OVERRIDE (IP): Performed by: SURGERY

## 2021-09-14 RX ORDER — NICOTINE 21 MG/24HR
21 PATCH, TRANSDERMAL 24 HOURS TRANSDERMAL
Status: DISCONTINUED | OUTPATIENT
Start: 2021-09-14 | End: 2021-09-17

## 2021-09-14 RX ORDER — POTASSIUM CHLORIDE 20 MEQ/1
40 TABLET, EXTENDED RELEASE ORAL ONCE
Status: COMPLETED | OUTPATIENT
Start: 2021-09-14 | End: 2021-09-14

## 2021-09-14 RX ORDER — FUROSEMIDE 10 MG/ML
20 INJECTION INTRAMUSCULAR; INTRAVENOUS ONCE
Status: COMPLETED | OUTPATIENT
Start: 2021-09-14 | End: 2021-09-14

## 2021-09-14 RX ADMIN — OXYCODONE 5 MG: 5 TABLET ORAL at 14:05

## 2021-09-14 RX ADMIN — ENOXAPARIN SODIUM 30 MG: 30 INJECTION SUBCUTANEOUS at 16:51

## 2021-09-14 RX ADMIN — GUAIFENESIN 200 MG: 100 SOLUTION ORAL at 08:47

## 2021-09-14 RX ADMIN — OXYCODONE 5 MG: 5 TABLET ORAL at 20:22

## 2021-09-14 RX ADMIN — NICOTINE POLACRILEX 2 MG: 2 GUM, CHEWING BUCCAL at 13:23

## 2021-09-14 RX ADMIN — GUAIFENESIN 200 MG: 100 SOLUTION ORAL at 13:23

## 2021-09-14 RX ADMIN — OXYCODONE 5 MG: 5 TABLET ORAL at 16:51

## 2021-09-14 RX ADMIN — QUETIAPINE FUMARATE 100 MG: 100 TABLET ORAL at 16:51

## 2021-09-14 RX ADMIN — GUAIFENESIN 200 MG: 100 SOLUTION ORAL at 01:53

## 2021-09-14 RX ADMIN — FUROSEMIDE 20 MG: 10 INJECTION, SOLUTION INTRAMUSCULAR; INTRAVENOUS at 08:47

## 2021-09-14 RX ADMIN — OXYCODONE 5 MG: 5 TABLET ORAL at 10:24

## 2021-09-14 RX ADMIN — GUAIFENESIN 200 MG: 100 SOLUTION ORAL at 20:22

## 2021-09-14 RX ADMIN — QUETIAPINE FUMARATE 100 MG: 100 TABLET ORAL at 05:55

## 2021-09-14 RX ADMIN — POTASSIUM CHLORIDE 40 MEQ: 1500 TABLET, EXTENDED RELEASE ORAL at 08:47

## 2021-09-14 RX ADMIN — OXYCODONE 5 MG: 5 TABLET ORAL at 01:53

## 2021-09-14 RX ADMIN — ENOXAPARIN SODIUM 30 MG: 30 INJECTION SUBCUTANEOUS at 05:55

## 2021-09-14 RX ADMIN — OXYCODONE 5 MG: 5 TABLET ORAL at 05:55

## 2021-09-14 ASSESSMENT — ENCOUNTER SYMPTOMS
SHORTNESS OF BREATH: 0
EYE PAIN: 0
ABDOMINAL PAIN: 0
NAUSEA: 0
SPUTUM PRODUCTION: 1
BLURRED VISION: 0
CHILLS: 0
CONSTIPATION: 0
FEVER: 0
SPEECH CHANGE: 0
ROS GI COMMENTS: LAST BM 9/13
VOMITING: 0
DIARRHEA: 0
COUGH: 1

## 2021-09-14 ASSESSMENT — COGNITIVE AND FUNCTIONAL STATUS - GENERAL
SUGGESTED CMS G CODE MODIFIER DAILY ACTIVITY: CH
DAILY ACTIVITIY SCORE: 24

## 2021-09-14 ASSESSMENT — FIBROSIS 4 INDEX: FIB4 SCORE: 0.43

## 2021-09-14 ASSESSMENT — PAIN DESCRIPTION - PAIN TYPE
TYPE: ACUTE PAIN

## 2021-09-14 ASSESSMENT — ACTIVITIES OF DAILY LIVING (ADL): TOILETING: INDEPENDENT

## 2021-09-14 NOTE — PROGRESS NOTES
New replacement miami j cervical collar pads have been delivered to pt's bedside to use when needed.
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
## 2021-09-14 NOTE — PROGRESS NOTES
"Patient childlike behavior polite and cooperative. Tele-sitter for oxygen compliance.  Denied pain this am. RUDI drain with 215 ml out yesterday, 60 ml this am. Abdomen soft rounded lower quadrants, 3 loose BMs last night 2 this am. RN attempted to have patient void in hat for collection patient missed hat. Midline with staples RENEE. [place] J collar in place. Ambulating with staff standby assistance. MD orders reviewed, discussed plan of care. /68   Pulse (!) 108 Comment: RN notified   Temp 36.1 °C (96.9 °F) (Temporal)   Resp 18   Ht 1.626 m (5' 4\")   Wt 70.7 kg (155 lb 13.8 oz)   SpO2 94%   BMI 26.75 kg/m²     "

## 2021-09-14 NOTE — PROGRESS NOTES
Bedside report received from Jyothi RN  Assessment completed  Pt AO x4. Telesitter at bedside.    Pt is sitting up in bed. Ambulates SBA  Bed alarm is on.  Denies N,V  Dressing: Old CT site covered with guaze and hypafix.  Last BM 9/13 (loose)  Denies SOB    Discussed POC with patient. Call light and belongings within reach. Hourly rounding in place.
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
## 2021-09-15 ENCOUNTER — APPOINTMENT (OUTPATIENT)
Dept: RADIOLOGY | Facility: MEDICAL CENTER | Age: 36
DRG: 957 | End: 2021-09-15
Attending: SPECIALIST
Payer: MEDICAID

## 2021-09-15 LAB
ANION GAP SERPL CALC-SCNC: 9 MMOL/L (ref 7–16)
BASOPHILS # BLD AUTO: 0.4 % (ref 0–1.8)
BASOPHILS # BLD: 0.04 K/UL (ref 0–0.12)
BUN SERPL-MCNC: 7 MG/DL (ref 8–22)
CALCIUM SERPL-MCNC: 8.9 MG/DL (ref 8.5–10.5)
CHLORIDE SERPL-SCNC: 100 MMOL/L (ref 96–112)
CO2 SERPL-SCNC: 27 MMOL/L (ref 20–33)
CREAT SERPL-MCNC: 0.39 MG/DL (ref 0.5–1.4)
EOSINOPHIL # BLD AUTO: 0.38 K/UL (ref 0–0.51)
EOSINOPHIL NFR BLD: 3.9 % (ref 0–6.9)
ERYTHROCYTE [DISTWIDTH] IN BLOOD BY AUTOMATED COUNT: 57.5 FL (ref 35.9–50)
GLUCOSE SERPL-MCNC: 134 MG/DL (ref 65–99)
HCT VFR BLD AUTO: 36.5 % (ref 37–47)
HGB BLD-MCNC: 11.7 G/DL (ref 12–16)
IMM GRANULOCYTES # BLD AUTO: 0.07 K/UL (ref 0–0.11)
IMM GRANULOCYTES NFR BLD AUTO: 0.7 % (ref 0–0.9)
LYMPHOCYTES # BLD AUTO: 1.14 K/UL (ref 1–4.8)
LYMPHOCYTES NFR BLD: 11.6 % (ref 22–41)
MCH RBC QN AUTO: 30.7 PG (ref 27–33)
MCHC RBC AUTO-ENTMCNC: 32.1 G/DL (ref 33.6–35)
MCV RBC AUTO: 95.8 FL (ref 81.4–97.8)
MONOCYTES # BLD AUTO: 0.86 K/UL (ref 0–0.85)
MONOCYTES NFR BLD AUTO: 8.8 % (ref 0–13.4)
NEUTROPHILS # BLD AUTO: 7.3 K/UL (ref 2–7.15)
NEUTROPHILS NFR BLD: 74.6 % (ref 44–72)
NRBC # BLD AUTO: 0 K/UL
NRBC BLD-RTO: 0 /100 WBC
PLATELET # BLD AUTO: 440 K/UL (ref 164–446)
PMV BLD AUTO: 9.6 FL (ref 9–12.9)
POTASSIUM SERPL-SCNC: 3.9 MMOL/L (ref 3.6–5.5)
RBC # BLD AUTO: 3.81 M/UL (ref 4.2–5.4)
SODIUM SERPL-SCNC: 136 MMOL/L (ref 135–145)
WBC # BLD AUTO: 9.8 K/UL (ref 4.8–10.8)

## 2021-09-15 PROCEDURE — A9270 NON-COVERED ITEM OR SERVICE: HCPCS | Performed by: NURSE PRACTITIONER

## 2021-09-15 PROCEDURE — 94669 MECHANICAL CHEST WALL OSCILL: CPT

## 2021-09-15 PROCEDURE — 700102 HCHG RX REV CODE 250 W/ 637 OVERRIDE(OP): Performed by: NURSE PRACTITIONER

## 2021-09-15 PROCEDURE — 36415 COLL VENOUS BLD VENIPUNCTURE: CPT

## 2021-09-15 PROCEDURE — 80048 BASIC METABOLIC PNL TOTAL CA: CPT

## 2021-09-15 PROCEDURE — 700111 HCHG RX REV CODE 636 W/ 250 OVERRIDE (IP): Performed by: SURGERY

## 2021-09-15 PROCEDURE — 71045 X-RAY EXAM CHEST 1 VIEW: CPT

## 2021-09-15 PROCEDURE — 770001 HCHG ROOM/CARE - MED/SURG/GYN PRIV*

## 2021-09-15 PROCEDURE — 85025 COMPLETE CBC W/AUTO DIFF WBC: CPT

## 2021-09-15 RX ADMIN — GUAIFENESIN 200 MG: 100 SOLUTION ORAL at 13:45

## 2021-09-15 RX ADMIN — QUETIAPINE FUMARATE 100 MG: 100 TABLET ORAL at 05:30

## 2021-09-15 RX ADMIN — OXYCODONE 5 MG: 5 TABLET ORAL at 17:41

## 2021-09-15 RX ADMIN — ENOXAPARIN SODIUM 30 MG: 30 INJECTION SUBCUTANEOUS at 16:35

## 2021-09-15 RX ADMIN — OXYCODONE 5 MG: 5 TABLET ORAL at 13:45

## 2021-09-15 RX ADMIN — OXYCODONE 5 MG: 5 TABLET ORAL at 02:10

## 2021-09-15 RX ADMIN — OXYCODONE 5 MG: 5 TABLET ORAL at 23:51

## 2021-09-15 RX ADMIN — GUAIFENESIN 200 MG: 100 SOLUTION ORAL at 09:29

## 2021-09-15 RX ADMIN — OXYCODONE 5 MG: 5 TABLET ORAL at 09:29

## 2021-09-15 RX ADMIN — OXYCODONE 5 MG: 5 TABLET ORAL at 20:44

## 2021-09-15 RX ADMIN — GUAIFENESIN 200 MG: 100 SOLUTION ORAL at 20:28

## 2021-09-15 RX ADMIN — DOCUSATE SODIUM 50 MG AND SENNOSIDES 8.6 MG 1 TABLET: 8.6; 5 TABLET, FILM COATED ORAL at 20:28

## 2021-09-15 RX ADMIN — NICOTINE TRANSDERMAL SYSTEM 21 MG: 21 PATCH, EXTENDED RELEASE TRANSDERMAL at 05:30

## 2021-09-15 RX ADMIN — GUAIFENESIN 200 MG: 100 SOLUTION ORAL at 02:10

## 2021-09-15 RX ADMIN — OXYCODONE 5 MG: 5 TABLET ORAL at 05:31

## 2021-09-15 RX ADMIN — QUETIAPINE FUMARATE 100 MG: 100 TABLET ORAL at 17:40

## 2021-09-15 RX ADMIN — ENOXAPARIN SODIUM 30 MG: 30 INJECTION SUBCUTANEOUS at 05:31

## 2021-09-15 ASSESSMENT — PAIN DESCRIPTION - PAIN TYPE
TYPE: ACUTE PAIN
TYPE: CHRONIC PAIN

## 2021-09-15 ASSESSMENT — ENCOUNTER SYMPTOMS
SPEECH CHANGE: 0
BLURRED VISION: 0
CHILLS: 0
SPUTUM PRODUCTION: 1
DIARRHEA: 0
NAUSEA: 0
ABDOMINAL PAIN: 0
EYE PAIN: 0
FEVER: 0
SHORTNESS OF BREATH: 0
CONSTIPATION: 0
COUGH: 1
VOMITING: 0
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
## 2021-09-15 NOTE — PROGRESS NOTES
Assumed care at 1900 Bedside report received from Jud RN  Assessment completed  Pt AO x4. Calls appropriately.  Pt is sitting up in bed. Ambulates SBA. Telesitter in place     Denies N,V  Dressing: CT dressing clean dry in tact  Drains: RUDI to bulb suction  Last BM 9/15 ; Pt is voiding  Denies SOB    Discussed POC with patient. Call light and belongings within reach. Hourly rounding in place.
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
## 2021-09-15 NOTE — DISCHARGE PLANNING
Anticipated Discharge Disposition: TBD     Action: RN CM reached out to pt contact Summer Aviles; Aunt [phone]. Per Summer it is ok to contact Father, Onofre Polanco [phone], and mother, Samia Tilley [phone] for safe discharge needs. RN CM spoke with patient bedside; pt gave verbal consent to reach out to said family for planning needs. Contacts added to chart.  @[address] updated MotherSamia, of course of stay thus far. Samia requests to be contacted with all future updates.     Barriers to Discharge: medical clearance, discharge planning     Plan: Follow up with medical team.

## 2021-09-15 NOTE — CARE PLAN
The patient is Stable - Low risk of patient condition declining or worsening    Shift Goals  Clinical Goals: OOB Activity/ IS   Patient Goals: OOB Activity/ IS  Family Goals: MENA    Progress made toward(s) clinical / shift goals:  Fall education provided at bedside, pt verbalized understanding. Self care education provided       Problem: Knowledge Deficit - Standard  Goal: Patient and family/care givers will demonstrate understanding of plan of care, disease process/condition, diagnostic tests and medications  Outcome: Progressing     Problem: Pain - Standard  Goal: Alleviation of pain or a reduction in pain to the patient’s comfort goal  Outcome: Progressing     Problem: Skin Integrity  Goal: Skin integrity is maintained or improved  Outcome: Progressing     Problem: Fall Risk  Goal: Patient will remain free from falls  Outcome: Progressing

## 2021-09-16 ENCOUNTER — APPOINTMENT (OUTPATIENT)
Dept: RADIOLOGY | Facility: MEDICAL CENTER | Age: 36
DRG: 957 | End: 2021-09-16
Attending: SPECIALIST
Payer: MEDICAID

## 2021-09-16 ENCOUNTER — APPOINTMENT (OUTPATIENT)
Dept: RADIOLOGY | Facility: MEDICAL CENTER | Age: 36
DRG: 957 | End: 2021-09-16
Payer: MEDICAID

## 2021-09-16 PROBLEM — T19.2XXA FOREIGN BODY IN VAGINA: Status: RESOLVED | Noted: 2021-09-04 | Resolved: 2021-09-16

## 2021-09-16 PROBLEM — R00.0 TACHYCARDIA: Status: ACTIVE | Noted: 2021-09-16

## 2021-09-16 LAB
ALBUMIN SERPL BCP-MCNC: 2.3 G/DL (ref 3.2–4.9)
ALBUMIN/GLOB SERPL: 0.7 G/DL
ALP SERPL-CCNC: 110 U/L (ref 30–99)
ALT SERPL-CCNC: 23 U/L (ref 2–50)
ANION GAP SERPL CALC-SCNC: 6 MMOL/L (ref 7–16)
APPEARANCE UR: CLEAR
AST SERPL-CCNC: 24 U/L (ref 12–45)
BASOPHILS # BLD AUTO: 0.4 % (ref 0–1.8)
BASOPHILS # BLD: 0.04 K/UL (ref 0–0.12)
BILIRUB SERPL-MCNC: 0.3 MG/DL (ref 0.1–1.5)
BILIRUB UR QL STRIP.AUTO: NEGATIVE
BUN SERPL-MCNC: 5 MG/DL (ref 8–22)
CALCIUM SERPL-MCNC: 8.6 MG/DL (ref 8.5–10.5)
CHLORIDE SERPL-SCNC: 99 MMOL/L (ref 96–112)
CO2 SERPL-SCNC: 28 MMOL/L (ref 20–33)
COLOR UR: YELLOW
CREAT SERPL-MCNC: 0.28 MG/DL (ref 0.5–1.4)
EKG IMPRESSION: NORMAL
EOSINOPHIL # BLD AUTO: 0.19 K/UL (ref 0–0.51)
EOSINOPHIL NFR BLD: 2.1 % (ref 0–6.9)
ERYTHROCYTE [DISTWIDTH] IN BLOOD BY AUTOMATED COUNT: 58.2 FL (ref 35.9–50)
GLOBULIN SER CALC-MCNC: 3.4 G/DL (ref 1.9–3.5)
GLUCOSE SERPL-MCNC: 99 MG/DL (ref 65–99)
GLUCOSE UR STRIP.AUTO-MCNC: NEGATIVE MG/DL
HCT VFR BLD AUTO: 32.4 % (ref 37–47)
HGB BLD-MCNC: 10.5 G/DL (ref 12–16)
IMM GRANULOCYTES # BLD AUTO: 0.06 K/UL (ref 0–0.11)
IMM GRANULOCYTES NFR BLD AUTO: 0.7 % (ref 0–0.9)
KETONES UR STRIP.AUTO-MCNC: NEGATIVE MG/DL
LACTATE BLD-SCNC: 1.4 MMOL/L (ref 0.5–2)
LEUKOCYTE ESTERASE UR QL STRIP.AUTO: NEGATIVE
LYMPHOCYTES # BLD AUTO: 1.41 K/UL (ref 1–4.8)
LYMPHOCYTES NFR BLD: 15.7 % (ref 22–41)
MCH RBC QN AUTO: 30.6 PG (ref 27–33)
MCHC RBC AUTO-ENTMCNC: 32.4 G/DL (ref 33.6–35)
MCV RBC AUTO: 94.5 FL (ref 81.4–97.8)
MICRO URNS: NORMAL
MONOCYTES # BLD AUTO: 1.21 K/UL (ref 0–0.85)
MONOCYTES NFR BLD AUTO: 13.5 % (ref 0–13.4)
NEUTROPHILS # BLD AUTO: 6.06 K/UL (ref 2–7.15)
NEUTROPHILS NFR BLD: 67.6 % (ref 44–72)
NITRITE UR QL STRIP.AUTO: NEGATIVE
NRBC # BLD AUTO: 0 K/UL
NRBC BLD-RTO: 0 /100 WBC
PH UR STRIP.AUTO: 6.5 [PH] (ref 5–8)
PLATELET # BLD AUTO: 441 K/UL (ref 164–446)
PMV BLD AUTO: 9.6 FL (ref 9–12.9)
POTASSIUM SERPL-SCNC: 4.2 MMOL/L (ref 3.6–5.5)
PROT SERPL-MCNC: 5.7 G/DL (ref 6–8.2)
PROT UR QL STRIP: NEGATIVE MG/DL
RBC # BLD AUTO: 3.43 M/UL (ref 4.2–5.4)
RBC UR QL AUTO: NEGATIVE
SODIUM SERPL-SCNC: 133 MMOL/L (ref 135–145)
SP GR UR STRIP.AUTO: 1.01
UROBILINOGEN UR STRIP.AUTO-MCNC: 0.2 MG/DL
WBC # BLD AUTO: 9 K/UL (ref 4.8–10.8)

## 2021-09-16 PROCEDURE — A9270 NON-COVERED ITEM OR SERVICE: HCPCS | Performed by: NURSE PRACTITIONER

## 2021-09-16 PROCEDURE — 700111 HCHG RX REV CODE 636 W/ 250 OVERRIDE (IP): Performed by: SURGERY

## 2021-09-16 PROCEDURE — 700105 HCHG RX REV CODE 258: Performed by: NURSE PRACTITIONER

## 2021-09-16 PROCEDURE — 93010 ELECTROCARDIOGRAM REPORT: CPT | Performed by: INTERNAL MEDICINE

## 2021-09-16 PROCEDURE — 770022 HCHG ROOM/CARE - ICU (200)

## 2021-09-16 PROCEDURE — 93005 ELECTROCARDIOGRAM TRACING: CPT

## 2021-09-16 PROCEDURE — 700102 HCHG RX REV CODE 250 W/ 637 OVERRIDE(OP): Performed by: NURSE PRACTITIONER

## 2021-09-16 PROCEDURE — 81003 URINALYSIS AUTO W/O SCOPE: CPT

## 2021-09-16 PROCEDURE — 71260 CT THORAX DX C+: CPT

## 2021-09-16 PROCEDURE — 71045 X-RAY EXAM CHEST 1 VIEW: CPT

## 2021-09-16 PROCEDURE — 87040 BLOOD CULTURE FOR BACTERIA: CPT

## 2021-09-16 PROCEDURE — 94669 MECHANICAL CHEST WALL OSCILL: CPT

## 2021-09-16 PROCEDURE — 700105 HCHG RX REV CODE 258: Performed by: SPECIALIST

## 2021-09-16 PROCEDURE — A9270 NON-COVERED ITEM OR SERVICE: HCPCS | Performed by: SURGERY

## 2021-09-16 PROCEDURE — 80053 COMPREHEN METABOLIC PANEL: CPT

## 2021-09-16 PROCEDURE — 700111 HCHG RX REV CODE 636 W/ 250 OVERRIDE (IP): Performed by: SPECIALIST

## 2021-09-16 PROCEDURE — 700117 HCHG RX CONTRAST REV CODE 255

## 2021-09-16 PROCEDURE — 700102 HCHG RX REV CODE 250 W/ 637 OVERRIDE(OP): Performed by: SURGERY

## 2021-09-16 PROCEDURE — 85025 COMPLETE CBC W/AUTO DIFF WBC: CPT

## 2021-09-16 PROCEDURE — 83605 ASSAY OF LACTIC ACID: CPT

## 2021-09-16 RX ORDER — SODIUM CHLORIDE 9 MG/ML
500 INJECTION, SOLUTION INTRAVENOUS ONCE
Status: COMPLETED | OUTPATIENT
Start: 2021-09-16 | End: 2021-09-16

## 2021-09-16 RX ORDER — SODIUM CHLORIDE 9 MG/ML
INJECTION, SOLUTION INTRAVENOUS CONTINUOUS
Status: DISCONTINUED | OUTPATIENT
Start: 2021-09-16 | End: 2021-09-17

## 2021-09-16 RX ORDER — SODIUM CHLORIDE 9 MG/ML
1000 INJECTION, SOLUTION INTRAVENOUS ONCE
Status: COMPLETED | OUTPATIENT
Start: 2021-09-16 | End: 2021-09-18

## 2021-09-16 RX ADMIN — QUETIAPINE FUMARATE 100 MG: 100 TABLET ORAL at 05:34

## 2021-09-16 RX ADMIN — DOCUSATE SODIUM 50 MG AND SENNOSIDES 8.6 MG 1 TABLET: 8.6; 5 TABLET, FILM COATED ORAL at 19:54

## 2021-09-16 RX ADMIN — ENOXAPARIN SODIUM 30 MG: 30 INJECTION SUBCUTANEOUS at 05:33

## 2021-09-16 RX ADMIN — NICOTINE TRANSDERMAL SYSTEM 21 MG: 21 PATCH, EXTENDED RELEASE TRANSDERMAL at 05:33

## 2021-09-16 RX ADMIN — MAGNESIUM HYDROXIDE 30 ML: 400 SUSPENSION ORAL at 05:33

## 2021-09-16 RX ADMIN — SODIUM CHLORIDE 500 ML: 9 INJECTION, SOLUTION INTRAVENOUS at 09:01

## 2021-09-16 RX ADMIN — PIPERACILLIN AND TAZOBACTAM 3.38 G: 3; .375 INJECTION, POWDER, LYOPHILIZED, FOR SOLUTION INTRAVENOUS; PARENTERAL at 20:55

## 2021-09-16 RX ADMIN — POLYETHYLENE GLYCOL 3350 1 PACKET: 17 POWDER, FOR SOLUTION ORAL at 05:34

## 2021-09-16 RX ADMIN — SODIUM CHLORIDE: 900 INJECTION INTRAVENOUS at 19:54

## 2021-09-16 RX ADMIN — SODIUM CHLORIDE 1000 ML: 9 INJECTION, SOLUTION INTRAVENOUS at 22:51

## 2021-09-16 RX ADMIN — QUETIAPINE FUMARATE 100 MG: 100 TABLET ORAL at 18:02

## 2021-09-16 RX ADMIN — DOCUSATE SODIUM 100 MG: 100 CAPSULE, LIQUID FILLED ORAL at 05:33

## 2021-09-16 RX ADMIN — OXYCODONE 5 MG: 5 TABLET ORAL at 12:49

## 2021-09-16 RX ADMIN — OXYCODONE 5 MG: 5 TABLET ORAL at 03:01

## 2021-09-16 RX ADMIN — GUAIFENESIN 200 MG: 100 SOLUTION ORAL at 03:01

## 2021-09-16 RX ADMIN — ENOXAPARIN SODIUM 30 MG: 30 INJECTION SUBCUTANEOUS at 18:01

## 2021-09-16 RX ADMIN — OXYCODONE 5 MG: 5 TABLET ORAL at 06:08

## 2021-09-16 RX ADMIN — DOCUSATE SODIUM 100 MG: 100 CAPSULE, LIQUID FILLED ORAL at 18:01

## 2021-09-16 RX ADMIN — OXYCODONE 5 MG: 5 TABLET ORAL at 18:01

## 2021-09-16 RX ADMIN — OXYCODONE 5 MG: 5 TABLET ORAL at 22:51

## 2021-09-16 RX ADMIN — IOHEXOL 100 ML: 350 INJECTION, SOLUTION INTRAVENOUS at 12:28

## 2021-09-16 ASSESSMENT — ENCOUNTER SYMPTOMS
HEADACHES: 0
NECK PAIN: 1
BACK PAIN: 0
BLURRED VISION: 0
WHEEZING: 0
MYALGIAS: 1
PALPITATIONS: 0
TINGLING: 0
COUGH: 0
BACK PAIN: 1
HEMOPTYSIS: 0
CHILLS: 0
BRUISES/BLEEDS EASILY: 0
WEAKNESS: 0
MYALGIAS: 0
NAUSEA: 0
ABDOMINAL PAIN: 0
SENSORY CHANGE: 0
VOMITING: 0
DIAPHORESIS: 0
DOUBLE VISION: 0
SHORTNESS OF BREATH: 0
DIZZINESS: 0
SPUTUM PRODUCTION: 0
FEVER: 0

## 2021-09-16 ASSESSMENT — PAIN DESCRIPTION - PAIN TYPE
TYPE: ACUTE PAIN
TYPE: ACUTE PAIN
TYPE: ACUTE PAIN;SURGICAL PAIN
TYPE: SURGICAL PAIN
TYPE: ACUTE PAIN
TYPE: ACUTE PAIN;SURGICAL PAIN
TYPE: ACUTE PAIN
TYPE: SURGICAL PAIN

## 2021-09-16 ASSESSMENT — COPD QUESTIONNAIRES
DURING THE PAST 4 WEEKS HOW MUCH DID YOU FEEL SHORT OF BREATH: NONE/LITTLE OF THE TIME
DO YOU EVER COUGH UP ANY MUCUS OR PHLEGM?: NO/ONLY WITH OCCASIONAL COLDS OR INFECTIONS
COPD SCREENING SCORE: 2
HAVE YOU SMOKED AT LEAST 100 CIGARETTES IN YOUR ENTIRE LIFE: YES
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
## 2021-09-16 NOTE — CARE PLAN
The patient is Watcher - Medium risk of patient condition declining or worsening    Shift Goals  Clinical Goals: pain control, IS  Patient Goals: rest  Family Goals: MENA    Progress made toward(s) clinical / shift goals: Patient up to bathroom, patient calls for assistance, bed alarm on, tele sitter in place, pt pain controlled with PRN medication    Problem: Pain - Standard  Goal: Alleviation of pain or a reduction in pain to the patient’s comfort goal  Outcome: Progressing     Problem: Fall Risk  Goal: Patient will remain free from falls  Outcome: Progressing     Patient is not progressing towards the following goals:

## 2021-09-16 NOTE — CARE PLAN
The patient is Stable - Low risk of patient condition declining or worsening    Shift Goals  Clinical Goals: Pt safety  Patient Goals: rest  Family Goals: MENA    Progress made toward(s) clinical / shift goals:  Fall education provided at bedside. All personal belongings and call light within reach.       Problem: Knowledge Deficit - Standard  Goal: Patient and family/care givers will demonstrate understanding of plan of care, disease process/condition, diagnostic tests and medications  Outcome: Progressing     Problem: Pain - Standard  Goal: Alleviation of pain or a reduction in pain to the patient’s comfort goal  Outcome: Progressing     Problem: Skin Integrity  Goal: Skin integrity is maintained or improved  Outcome: Progressing     Problem: Fall Risk  Goal: Patient will remain free from falls  Outcome: Progressing

## 2021-09-16 NOTE — PROGRESS NOTES
"/79   Pulse (!) 109   Temp 36.8 °C (98.3 °F) (Temporal)   Resp 18   Ht 1.626 m (5' 4\")   Wt 59.1 kg (130 lb 4.7 oz)   SpO2 93%   BMI 22.36 kg/m²     CT reviewed with Dr. Aguayo  IR consult pending for possible drain/chest tube placement  Vital signs improved    Mother Samia updated on patient's hospital course over phone with patient's consent this afternoon @ 1445    - Will continue to monitor closely  "

## 2021-09-16 NOTE — PROGRESS NOTES
Bedside report received.  Assessment complete.  A&O x 4. Patient calls appropriately.  Patient ambulates with standby assist. Bed alarm on.   Patient has 9/10 pain. Pain managed with prescribed medications.  Denies N&V. Tolerating small bite size diet.  MLI to staples, RUDI drain to right abdomen. Old chest tube sites to gauze/tegaderm.  C collar spine in place.  + void, last BM PTA.  Review plan with of care with patient. Call light and personal belongings within reach. Hourly rounding in place. All needs met at this time.

## 2021-09-16 NOTE — CARE PLAN
Problem: Nutritional:  Goal: Achieve adequate nutritional intake  Description: Patient will consume 50% of meals  Outcome: Met

## 2021-09-17 ENCOUNTER — APPOINTMENT (OUTPATIENT)
Dept: RADIOLOGY | Facility: MEDICAL CENTER | Age: 36
DRG: 957 | End: 2021-09-17
Payer: MEDICAID

## 2021-09-17 ENCOUNTER — APPOINTMENT (OUTPATIENT)
Dept: RADIOLOGY | Facility: MEDICAL CENTER | Age: 36
DRG: 957 | End: 2021-09-17
Attending: SPECIALIST
Payer: MEDICAID

## 2021-09-17 PROBLEM — J90 LOCULATED PLEURAL EFFUSION: Status: ACTIVE | Noted: 2021-09-04

## 2021-09-17 LAB
BASOPHILS # BLD AUTO: 0.4 % (ref 0–1.8)
BASOPHILS # BLD: 0.04 K/UL (ref 0–0.12)
CFT BLD TEG: 5 MIN (ref 4.6–9.1)
CFT P HPASE BLD TEG: 4.8 MIN (ref 4.3–8.3)
CLOT ANGLE BLD TEG: 78.7 DEGREES (ref 63–78)
CLOT LYSIS 30M P MA LENFR BLD TEG: 0.1 % (ref 0–2.6)
CT.EXTRINSIC BLD ROTEM: 0.8 MIN (ref 0.8–2.1)
EOSINOPHIL # BLD AUTO: 0.22 K/UL (ref 0–0.51)
EOSINOPHIL NFR BLD: 2 % (ref 0–6.9)
ERYTHROCYTE [DISTWIDTH] IN BLOOD BY AUTOMATED COUNT: 59.2 FL (ref 35.9–50)
GRAM STN SPEC: NORMAL
HCT VFR BLD AUTO: 30.9 % (ref 37–47)
HGB BLD-MCNC: 9.9 G/DL (ref 12–16)
IMM GRANULOCYTES # BLD AUTO: 0.05 K/UL (ref 0–0.11)
IMM GRANULOCYTES NFR BLD AUTO: 0.5 % (ref 0–0.9)
LACTATE BLD-SCNC: 0.7 MMOL/L (ref 0.5–2)
LACTATE BLD-SCNC: 0.9 MMOL/L (ref 0.5–2)
LACTATE BLD-SCNC: 1.3 MMOL/L (ref 0.5–2)
LYMPHOCYTES # BLD AUTO: 1.37 K/UL (ref 1–4.8)
LYMPHOCYTES NFR BLD: 12.7 % (ref 22–41)
MCF BLD TEG: 72.4 MM (ref 52–69)
MCF.PLATELET INHIB BLD ROTEM: 44.9 MM (ref 15–32)
MCH RBC QN AUTO: 30.6 PG (ref 27–33)
MCHC RBC AUTO-ENTMCNC: 32 G/DL (ref 33.6–35)
MCV RBC AUTO: 95.4 FL (ref 81.4–97.8)
MONOCYTES # BLD AUTO: 1.16 K/UL (ref 0–0.85)
MONOCYTES NFR BLD AUTO: 10.8 % (ref 0–13.4)
NEUTROPHILS # BLD AUTO: 7.93 K/UL (ref 2–7.15)
NEUTROPHILS NFR BLD: 73.6 % (ref 44–72)
NRBC # BLD AUTO: 0 K/UL
NRBC BLD-RTO: 0 /100 WBC
PA AA BLD-ACNC: ABNORMAL % (ref 0–11)
PA ADP BLD-ACNC: ABNORMAL % (ref 0–17)
PLATELET # BLD AUTO: 468 K/UL (ref 164–446)
PMV BLD AUTO: 9.9 FL (ref 9–12.9)
RBC # BLD AUTO: 3.24 M/UL (ref 4.2–5.4)
SIGNIFICANT IND 70042: NORMAL
SITE SITE: NORMAL
SOURCE SOURCE: NORMAL
TEG ALGORITHM TGALG: ABNORMAL
WBC # BLD AUTO: 10.8 K/UL (ref 4.8–10.8)

## 2021-09-17 PROCEDURE — 700102 HCHG RX REV CODE 250 W/ 637 OVERRIDE(OP): Performed by: NURSE PRACTITIONER

## 2021-09-17 PROCEDURE — 71045 X-RAY EXAM CHEST 1 VIEW: CPT

## 2021-09-17 PROCEDURE — 83605 ASSAY OF LACTIC ACID: CPT | Mod: 91

## 2021-09-17 PROCEDURE — 700105 HCHG RX REV CODE 258: Performed by: SPECIALIST

## 2021-09-17 PROCEDURE — 99152 MOD SED SAME PHYS/QHP 5/>YRS: CPT

## 2021-09-17 PROCEDURE — A9270 NON-COVERED ITEM OR SERVICE: HCPCS | Performed by: NURSE PRACTITIONER

## 2021-09-17 PROCEDURE — 85384 FIBRINOGEN ACTIVITY: CPT

## 2021-09-17 PROCEDURE — 770001 HCHG ROOM/CARE - MED/SURG/GYN PRIV*

## 2021-09-17 PROCEDURE — 99153 MOD SED SAME PHYS/QHP EA: CPT

## 2021-09-17 PROCEDURE — 700111 HCHG RX REV CODE 636 W/ 250 OVERRIDE (IP): Performed by: SPECIALIST

## 2021-09-17 PROCEDURE — 87205 SMEAR GRAM STAIN: CPT | Mod: 91

## 2021-09-17 PROCEDURE — A9270 NON-COVERED ITEM OR SERVICE: HCPCS | Performed by: SURGERY

## 2021-09-17 PROCEDURE — 0W9930Z DRAINAGE OF RIGHT PLEURAL CAVITY WITH DRAINAGE DEVICE, PERCUTANEOUS APPROACH: ICD-10-PCS | Performed by: RADIOLOGY

## 2021-09-17 PROCEDURE — 85347 COAGULATION TIME ACTIVATED: CPT

## 2021-09-17 PROCEDURE — 85025 COMPLETE CBC W/AUTO DIFF WBC: CPT

## 2021-09-17 PROCEDURE — 0W9G30Z DRAINAGE OF PERITONEAL CAVITY WITH DRAINAGE DEVICE, PERCUTANEOUS APPROACH: ICD-10-PCS | Performed by: RADIOLOGY

## 2021-09-17 PROCEDURE — 99232 SBSQ HOSP IP/OBS MODERATE 35: CPT | Performed by: SURGERY

## 2021-09-17 PROCEDURE — 700111 HCHG RX REV CODE 636 W/ 250 OVERRIDE (IP)

## 2021-09-17 PROCEDURE — 85576 BLOOD PLATELET AGGREGATION: CPT | Mod: 91

## 2021-09-17 PROCEDURE — 87070 CULTURE OTHR SPECIMN AEROBIC: CPT | Mod: 91

## 2021-09-17 PROCEDURE — 700111 HCHG RX REV CODE 636 W/ 250 OVERRIDE (IP): Performed by: RADIOLOGY

## 2021-09-17 PROCEDURE — 700102 HCHG RX REV CODE 250 W/ 637 OVERRIDE(OP): Performed by: SURGERY

## 2021-09-17 RX ORDER — SODIUM CHLORIDE 9 MG/ML
500 INJECTION, SOLUTION INTRAVENOUS
Status: DISCONTINUED | OUTPATIENT
Start: 2021-09-17 | End: 2021-09-17 | Stop reason: HOSPADM

## 2021-09-17 RX ORDER — QUETIAPINE FUMARATE 100 MG/1
100 TABLET, FILM COATED ORAL NIGHTLY
Status: DISCONTINUED | OUTPATIENT
Start: 2021-09-17 | End: 2021-09-28

## 2021-09-17 RX ORDER — FLUMAZENIL 0.1 MG/ML
INJECTION INTRAVENOUS
Status: COMPLETED
Start: 2021-09-17 | End: 2021-09-17

## 2021-09-17 RX ORDER — MIDAZOLAM HYDROCHLORIDE 1 MG/ML
INJECTION INTRAMUSCULAR; INTRAVENOUS
Status: COMPLETED
Start: 2021-09-17 | End: 2021-09-17

## 2021-09-17 RX ORDER — NALOXONE HYDROCHLORIDE 0.4 MG/ML
INJECTION, SOLUTION INTRAMUSCULAR; INTRAVENOUS; SUBCUTANEOUS
Status: COMPLETED
Start: 2021-09-17 | End: 2021-09-17

## 2021-09-17 RX ORDER — QUETIAPINE FUMARATE 25 MG/1
50 TABLET, FILM COATED ORAL EVERY MORNING
Status: DISCONTINUED | OUTPATIENT
Start: 2021-09-17 | End: 2021-10-04 | Stop reason: HOSPADM

## 2021-09-17 RX ORDER — ONDANSETRON 2 MG/ML
4 INJECTION INTRAMUSCULAR; INTRAVENOUS PRN
Status: DISCONTINUED | OUTPATIENT
Start: 2021-09-17 | End: 2021-09-17 | Stop reason: HOSPADM

## 2021-09-17 RX ORDER — MIDAZOLAM HYDROCHLORIDE 1 MG/ML
.5-2 INJECTION INTRAMUSCULAR; INTRAVENOUS PRN
Status: DISCONTINUED | OUTPATIENT
Start: 2021-09-17 | End: 2021-09-17 | Stop reason: HOSPADM

## 2021-09-17 RX ADMIN — MIDAZOLAM HYDROCHLORIDE 1 MG: 1 INJECTION, SOLUTION INTRAMUSCULAR; INTRAVENOUS at 11:54

## 2021-09-17 RX ADMIN — SODIUM CHLORIDE: 900 INJECTION INTRAVENOUS at 00:36

## 2021-09-17 RX ADMIN — PIPERACILLIN AND TAZOBACTAM 3.38 G: 3; .375 INJECTION, POWDER, LYOPHILIZED, FOR SOLUTION INTRAVENOUS; PARENTERAL at 09:22

## 2021-09-17 RX ADMIN — MIDAZOLAM HYDROCHLORIDE 1 MG: 1 INJECTION, SOLUTION INTRAMUSCULAR; INTRAVENOUS at 12:04

## 2021-09-17 RX ADMIN — OXYCODONE 5 MG: 5 TABLET ORAL at 02:49

## 2021-09-17 RX ADMIN — MIDAZOLAM HYDROCHLORIDE 2 MG: 1 INJECTION, SOLUTION INTRAMUSCULAR; INTRAVENOUS at 12:34

## 2021-09-17 RX ADMIN — FENTANYL CITRATE 25 MCG: 50 INJECTION, SOLUTION INTRAMUSCULAR; INTRAVENOUS at 12:02

## 2021-09-17 RX ADMIN — OXYCODONE 5 MG: 5 TABLET ORAL at 10:15

## 2021-09-17 RX ADMIN — QUETIAPINE FUMARATE 100 MG: 100 TABLET ORAL at 20:05

## 2021-09-17 RX ADMIN — FENTANYL CITRATE 25 MCG: 50 INJECTION, SOLUTION INTRAMUSCULAR; INTRAVENOUS at 11:55

## 2021-09-17 RX ADMIN — PIPERACILLIN AND TAZOBACTAM 3.38 G: 3; .375 INJECTION, POWDER, LYOPHILIZED, FOR SOLUTION INTRAVENOUS; PARENTERAL at 00:36

## 2021-09-17 RX ADMIN — FENTANYL CITRATE 25 MCG: 50 INJECTION, SOLUTION INTRAMUSCULAR; INTRAVENOUS at 12:15

## 2021-09-17 RX ADMIN — PIPERACILLIN AND TAZOBACTAM 3.38 G: 3; .375 INJECTION, POWDER, LYOPHILIZED, FOR SOLUTION INTRAVENOUS; PARENTERAL at 20:06

## 2021-09-17 RX ADMIN — OXYCODONE 5 MG: 5 TABLET ORAL at 06:32

## 2021-09-17 RX ADMIN — MIDAZOLAM HYDROCHLORIDE 1 MG: 1 INJECTION, SOLUTION INTRAMUSCULAR; INTRAVENOUS at 12:03

## 2021-09-17 RX ADMIN — MIDAZOLAM HYDROCHLORIDE 1 MG: 1 INJECTION, SOLUTION INTRAMUSCULAR; INTRAVENOUS at 12:30

## 2021-09-17 RX ADMIN — QUETIAPINE FUMARATE 100 MG: 100 TABLET ORAL at 05:08

## 2021-09-17 RX ADMIN — FENTANYL CITRATE 25 MCG: 50 INJECTION, SOLUTION INTRAMUSCULAR; INTRAVENOUS at 12:30

## 2021-09-17 RX ADMIN — FENTANYL CITRATE 25 MCG: 0.05 INJECTION, SOLUTION INTRAMUSCULAR; INTRAVENOUS at 11:55

## 2021-09-17 RX ADMIN — OXYCODONE 5 MG: 5 TABLET ORAL at 19:39

## 2021-09-17 RX ADMIN — OXYCODONE 5 MG: 5 TABLET ORAL at 16:16

## 2021-09-17 RX ADMIN — DOCUSATE SODIUM 100 MG: 100 CAPSULE, LIQUID FILLED ORAL at 05:09

## 2021-09-17 ASSESSMENT — ENCOUNTER SYMPTOMS
SENSORY CHANGE: 0
NECK PAIN: 1
WEAKNESS: 0
TINGLING: 0
VOMITING: 0
SPUTUM PRODUCTION: 0
CHILLS: 0
HEADACHES: 0
BLURRED VISION: 0
FEVER: 0
ABDOMINAL PAIN: 0
MYALGIAS: 0
NAUSEA: 0
COUGH: 0
DOUBLE VISION: 0
DIAPHORESIS: 0
BACK PAIN: 0
WHEEZING: 0
DIZZINESS: 0
SHORTNESS OF BREATH: 0

## 2021-09-17 ASSESSMENT — PAIN DESCRIPTION - PAIN TYPE
TYPE: ACUTE PAIN
TYPE: ACUTE PAIN
TYPE: ACUTE PAIN;SURGICAL PAIN
TYPE: ACUTE PAIN

## 2021-09-17 ASSESSMENT — FIBROSIS 4 INDEX: FIB4 SCORE: 0.4

## 2021-09-17 NOTE — PROGRESS NOTES
1940- Spoke with Orquidea Guzman regarding patient's tachycardia and increased oxygen demands. Orders for NS 50 ml/hr.     2015- additional orders for zosyn, lactic, blood cultures placed as well as transfer pt to ICU for monitoring.

## 2021-09-17 NOTE — OR SURGEON
Immediate Post- Operative Note        Findings: Large Right Pleural effusion    Procedure(s): CT guided Right Chest Tube Placement      Estimated Blood Loss: Less than 5 ml    Complications: None    9/17/2021     12:18 PM     Alex Grullon M.D.

## 2021-09-17 NOTE — PROGRESS NOTES
2230 Patient transferred to [facility]  96% on 3 L O2.     /73  Temp 98.5 f    4 Eyes Skin Assessment Completed by jazlyn RN and Kavya RN.    Head WDL  Ears WDL  Nose WDL  Mouth WDL  Neck WDL  Breast/Chest old chest tube site on right   Shoulder Blades WDL  Spine red spot on right lower back  (R) Arm/Elbow/Hand WDL  (L) Arm/Elbow/Hand WDL  Abdomen Incision, R RUDI drain  Groin WDL  Scrotum/Coccyx/Buttocks WDL  (R) Leg WDL  (L) Leg WDL  (R) Heel/Foot/Toe Scab  (L) Heel/Foot/Toe Redness and Scab          Devices In Places Blood Pressure Cuff, Pulse Ox, SCD's and Nasal Cannula      Interventions In Place NC W/Ear Foams and Low Air Loss Mattress    Possible Skin Injury No    Pictures Uploaded Into Epic N/A  Wound Consult Placed N/A  RN Wound Prevention Protocol Ordered No

## 2021-09-17 NOTE — ASSESSMENT & PLAN NOTE
Tachycardia with increased oxygen needs.  Transferred from [facility] to [facility].   9/16 Zosyn started.  9/17 IR for right chest tube placement and abdominal fluid drainage--2 abdominal JPs placed  9/23 Removal of chest tube and IR drain by IR.  Continue to monitor heart rate.

## 2021-09-17 NOTE — DISCHARGE PLANNING
Action: Late Entry 9/16/21 - [facility] received a phone call from Samia, patient's mother. Samia requested a medical update, [facility] notified Bill Dozier.    Samia stated she will allow patient to live with her when [place] [facility] Per Samia, the patient's father lives across the street from her house and he will also care for patient.    Plan: As Above.

## 2021-09-17 NOTE — PROGRESS NOTES
Came to bedside due to concern of tachycardia of 120-130. She required 9 L nasal cannula after going to the bathroom. When RN tried to wean oxygen she would desat to 89%. She is scheduled for IR chest tube and abdominal fluid drainage in AM tomorrow. Spoke with Dr. Aguayo and Dr. Espinosa, and plan to transfer to ICU for high flow oxygen and lactic acid ordered. Blood cultures ordered and started on Zosyn empirically.

## 2021-09-17 NOTE — PROGRESS NOTES
1300: Pt returned to [facility]. Bedside report received from Lety DODSON RN.     1330: Pt chest tube has put out 1,000 mL; Dr. Sibley updated. No new orders needed at this time, pt's VSS, titrating O2 down.
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
## 2021-09-17 NOTE — PROGRESS NOTES
CT NOTE:  RIGHT CHEST TUBE AND RIGHT PERITONEAL DRAIN PLACED BY DR. SARAVIA, BOTH 10FR TUBES 70ML OUT OF CHEST TUBE AND 140ML OUT OF PERITONEAL DRAIN, PT TOLERATED WELL VSS ON 15L NRB

## 2021-09-17 NOTE — PROGRESS NOTES
"Bedside report received.  Assessment complete.  A&O x 4. Patient calls appropriately.  Patient ambulates with standby assist. Bed alarm on.   Patient has 7/10 pain. Pain managed with prescribed medications.  Denies N&V. Tolerating small bite size diet, NPO at midnight for procedure.  MLI to staples, RUDI drain to right abdomen. Old chest tube sites to gauze/tegaderm.  C collar spine in place.  + void, last BM 9/15.  Review plan with of care with patient. Call light and personal belongings within reach. Hourly rounding in place. All needs met at this time.  /64   Pulse (!) 130   Temp 36.4 °C (97.5 °F) (Temporal)   Resp 19   Ht 1.626 m (5' 4\")   Wt 59.1 kg (130 lb 4.7 oz)   SpO2 95%   "

## 2021-09-17 NOTE — OR SURGEON
Immediate Post- Operative Note        Findings: Large Right Intraabdominal Fluid Collectiom    Procedure(s): CT Drainage of Large Right Intrabdominal Collection    Estimated Blood Loss: Less than 5 ml    Complications: None    9/17/2021     12:37 PM     Alex Grullon M.D.

## 2021-09-18 ENCOUNTER — APPOINTMENT (OUTPATIENT)
Dept: RADIOLOGY | Facility: MEDICAL CENTER | Age: 36
DRG: 957 | End: 2021-09-18
Attending: SPECIALIST
Payer: MEDICAID

## 2021-09-18 LAB
ANION GAP SERPL CALC-SCNC: 8 MMOL/L (ref 7–16)
BASOPHILS # BLD AUTO: 0.4 % (ref 0–1.8)
BASOPHILS # BLD: 0.04 K/UL (ref 0–0.12)
BUN SERPL-MCNC: 4 MG/DL (ref 8–22)
CALCIUM SERPL-MCNC: 8.8 MG/DL (ref 8.5–10.5)
CHLORIDE SERPL-SCNC: 99 MMOL/L (ref 96–112)
CO2 SERPL-SCNC: 25 MMOL/L (ref 20–33)
CREAT SERPL-MCNC: 0.3 MG/DL (ref 0.5–1.4)
EOSINOPHIL # BLD AUTO: 0.34 K/UL (ref 0–0.51)
EOSINOPHIL NFR BLD: 3.3 % (ref 0–6.9)
ERYTHROCYTE [DISTWIDTH] IN BLOOD BY AUTOMATED COUNT: 58.8 FL (ref 35.9–50)
GLUCOSE SERPL-MCNC: 110 MG/DL (ref 65–99)
GRAM STN SPEC: NORMAL
HCT VFR BLD AUTO: 31.4 % (ref 37–47)
HGB BLD-MCNC: 10 G/DL (ref 12–16)
IMM GRANULOCYTES # BLD AUTO: 0.04 K/UL (ref 0–0.11)
IMM GRANULOCYTES NFR BLD AUTO: 0.4 % (ref 0–0.9)
LYMPHOCYTES # BLD AUTO: 1.4 K/UL (ref 1–4.8)
LYMPHOCYTES NFR BLD: 13.8 % (ref 22–41)
MCH RBC QN AUTO: 30.3 PG (ref 27–33)
MCHC RBC AUTO-ENTMCNC: 31.8 G/DL (ref 33.6–35)
MCV RBC AUTO: 95.2 FL (ref 81.4–97.8)
MONOCYTES # BLD AUTO: 1.02 K/UL (ref 0–0.85)
MONOCYTES NFR BLD AUTO: 10 % (ref 0–13.4)
NEUTROPHILS # BLD AUTO: 7.31 K/UL (ref 2–7.15)
NEUTROPHILS NFR BLD: 72.1 % (ref 44–72)
NRBC # BLD AUTO: 0 K/UL
NRBC BLD-RTO: 0 /100 WBC
PLATELET # BLD AUTO: 516 K/UL (ref 164–446)
PMV BLD AUTO: 9.9 FL (ref 9–12.9)
POTASSIUM SERPL-SCNC: 3.9 MMOL/L (ref 3.6–5.5)
RBC # BLD AUTO: 3.3 M/UL (ref 4.2–5.4)
SIGNIFICANT IND 70042: NORMAL
SITE SITE: NORMAL
SODIUM SERPL-SCNC: 132 MMOL/L (ref 135–145)
SOURCE SOURCE: NORMAL
WBC # BLD AUTO: 10.2 K/UL (ref 4.8–10.8)

## 2021-09-18 PROCEDURE — 770001 HCHG ROOM/CARE - MED/SURG/GYN PRIV*

## 2021-09-18 PROCEDURE — 71045 X-RAY EXAM CHEST 1 VIEW: CPT

## 2021-09-18 PROCEDURE — A9270 NON-COVERED ITEM OR SERVICE: HCPCS | Performed by: NURSE PRACTITIONER

## 2021-09-18 PROCEDURE — 700102 HCHG RX REV CODE 250 W/ 637 OVERRIDE(OP): Performed by: NURSE PRACTITIONER

## 2021-09-18 PROCEDURE — 99231 SBSQ HOSP IP/OBS SF/LOW 25: CPT | Performed by: SURGERY

## 2021-09-18 PROCEDURE — 700111 HCHG RX REV CODE 636 W/ 250 OVERRIDE (IP): Performed by: SURGERY

## 2021-09-18 PROCEDURE — 700105 HCHG RX REV CODE 258: Performed by: SPECIALIST

## 2021-09-18 PROCEDURE — 94669 MECHANICAL CHEST WALL OSCILL: CPT

## 2021-09-18 PROCEDURE — A9270 NON-COVERED ITEM OR SERVICE: HCPCS | Performed by: SURGERY

## 2021-09-18 PROCEDURE — 85025 COMPLETE CBC W/AUTO DIFF WBC: CPT

## 2021-09-18 PROCEDURE — 80048 BASIC METABOLIC PNL TOTAL CA: CPT

## 2021-09-18 PROCEDURE — 36415 COLL VENOUS BLD VENIPUNCTURE: CPT

## 2021-09-18 PROCEDURE — 700102 HCHG RX REV CODE 250 W/ 637 OVERRIDE(OP): Performed by: SURGERY

## 2021-09-18 PROCEDURE — 700111 HCHG RX REV CODE 636 W/ 250 OVERRIDE (IP): Performed by: SPECIALIST

## 2021-09-18 RX ADMIN — OXYCODONE 5 MG: 5 TABLET ORAL at 04:52

## 2021-09-18 RX ADMIN — DOCUSATE SODIUM 100 MG: 100 CAPSULE, LIQUID FILLED ORAL at 17:40

## 2021-09-18 RX ADMIN — ENOXAPARIN SODIUM 30 MG: 30 INJECTION SUBCUTANEOUS at 17:40

## 2021-09-18 RX ADMIN — PIPERACILLIN AND TAZOBACTAM 3.38 G: 3; .375 INJECTION, POWDER, LYOPHILIZED, FOR SOLUTION INTRAVENOUS; PARENTERAL at 12:09

## 2021-09-18 RX ADMIN — OXYCODONE 5 MG: 5 TABLET ORAL at 15:53

## 2021-09-18 RX ADMIN — PIPERACILLIN AND TAZOBACTAM 3.38 G: 3; .375 INJECTION, POWDER, LYOPHILIZED, FOR SOLUTION INTRAVENOUS; PARENTERAL at 04:53

## 2021-09-18 RX ADMIN — OXYCODONE 5 MG: 5 TABLET ORAL at 20:04

## 2021-09-18 RX ADMIN — ENOXAPARIN SODIUM 30 MG: 30 INJECTION SUBCUTANEOUS at 05:03

## 2021-09-18 RX ADMIN — OXYCODONE 5 MG: 5 TABLET ORAL at 01:04

## 2021-09-18 RX ADMIN — QUETIAPINE FUMARATE 50 MG: 100 TABLET ORAL at 04:52

## 2021-09-18 RX ADMIN — OXYCODONE 5 MG: 5 TABLET ORAL at 12:08

## 2021-09-18 RX ADMIN — DOCUSATE SODIUM 50 MG AND SENNOSIDES 8.6 MG 1 TABLET: 8.6; 5 TABLET, FILM COATED ORAL at 20:10

## 2021-09-18 RX ADMIN — POLYETHYLENE GLYCOL 3350 1 PACKET: 17 POWDER, FOR SOLUTION ORAL at 17:41

## 2021-09-18 RX ADMIN — OXYCODONE 5 MG: 5 TABLET ORAL at 08:17

## 2021-09-18 RX ADMIN — PIPERACILLIN AND TAZOBACTAM 3.38 G: 3; .375 INJECTION, POWDER, LYOPHILIZED, FOR SOLUTION INTRAVENOUS; PARENTERAL at 20:06

## 2021-09-18 RX ADMIN — QUETIAPINE FUMARATE 100 MG: 100 TABLET ORAL at 20:05

## 2021-09-18 ASSESSMENT — ENCOUNTER SYMPTOMS
FEVER: 0
DIZZINESS: 0
NECK PAIN: 1
WHEEZING: 0
DIAPHORESIS: 0
CHILLS: 0
MYALGIAS: 0
ABDOMINAL PAIN: 0
FOCAL WEAKNESS: 0
VOMITING: 0
COUGH: 0
HEADACHES: 0
DOUBLE VISION: 0
CONSTIPATION: 0
WEAKNESS: 0
SHORTNESS OF BREATH: 1
NAUSEA: 0
TINGLING: 0
BLURRED VISION: 0
SPUTUM PRODUCTION: 0
DIARRHEA: 0
SENSORY CHANGE: 0
BACK PAIN: 0

## 2021-09-18 ASSESSMENT — COGNITIVE AND FUNCTIONAL STATUS - GENERAL
CLIMB 3 TO 5 STEPS WITH RAILING: A LITTLE
STANDING UP FROM CHAIR USING ARMS: A LITTLE
SUGGESTED CMS G CODE MODIFIER MOBILITY: CJ
WALKING IN HOSPITAL ROOM: A LITTLE
DAILY ACTIVITIY SCORE: 24
SUGGESTED CMS G CODE MODIFIER DAILY ACTIVITY: CH
MOBILITY SCORE: 21

## 2021-09-18 ASSESSMENT — PAIN DESCRIPTION - PAIN TYPE
TYPE: ACUTE PAIN

## 2021-09-18 ASSESSMENT — FIBROSIS 4 INDEX: FIB4 SCORE: 0.37

## 2021-09-18 NOTE — CARE PLAN
Problem: Fall Risk  Goal: Patient will remain free from falls  Outcome: Progressing     Problem: Urinary Elimination  Goal: Establish and maintain regular urinary output  Outcome: Progressing       The patient is Stable - Low risk of patient condition declining or worsening    Shift Goals  Clinical Goals: Mobility  Patient Goals: IR; pain management  Family Goals: N/A    Progress made toward(s) clinical / shift goals:  Pt bed locked in lowest position and has on nonslip socks. Telesitter in place. Pt calls appropriately for assistance to the bathroom. Pt uses bathroom regularly and on own.

## 2021-09-18 NOTE — PROGRESS NOTES
Patient arrived via bed to T422. Patient is alert and oriented, on 4L02, assisted patient to bathroom, voiding with difficulty. Tele sitter initiated and set up. Right chest tube to suction. x2 right RUDI drains, #1 with serous output, #2 brown output. Sacrum red/rash/blanching. [place] J collar in place. Call light within reach.

## 2021-09-18 NOTE — PROGRESS NOTES
Bedside report received.  Assessment complete.  A&O x4. Patient calls appropriately.  Patient ambulates with standby assist.  Patient has 4/10 pain. Medicated per MAR.   MLI with staples, gauze and hypafix. CDI.R chest tube site with gauze and hypafix. CDI. Suprapubic RUDI drain (#2) under same midline dressing. Putting out brown fluid. R LP drain (#1) putting out serosang fluid. Site is red and oozing pus. Site cleaned and dressing applied.  Tolerating regular diet. Denies N/V.  + void, + flatus, + BM. Last BM 9/17.  SCD's on.  Review plan with of care with patient. Call light and personal belongings within reach. Hourly rounding in place. All needs met at this time.

## 2021-09-18 NOTE — CARE PLAN
Problem: Knowledge Deficit - Standard  Goal: Patient and family/care givers will demonstrate understanding of plan of care, disease process/condition, diagnostic tests and medications  Outcome: Progressing     Problem: Fall Risk  Goal: Patient will remain free from falls  Outcome: Progressing  Pt low fall risk    Shift Goals  Clinical Goals: mobility  Patient Goals: pain management  Family Goals: N/A    Progress made toward(s) clinical / shift goals:  Pt updated on POC, pt verbalizes understanding.     Patient is not progressing towards the following goals:

## 2021-09-18 NOTE — PROGRESS NOTES
1730: Pt transferred from S-111 to T-422. Report called to Elen, all questions answered. Pt transferred via hospital floor bed with transport. All belongings taken, medication on chart, extra chest tube canister sent as well. Pt educated on POC and goals, no further needs at this time.
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
## 2021-09-19 ENCOUNTER — APPOINTMENT (OUTPATIENT)
Dept: RADIOLOGY | Facility: MEDICAL CENTER | Age: 36
DRG: 957 | End: 2021-09-19
Attending: SPECIALIST
Payer: MEDICAID

## 2021-09-19 ENCOUNTER — APPOINTMENT (OUTPATIENT)
Dept: RADIOLOGY | Facility: MEDICAL CENTER | Age: 36
DRG: 957 | End: 2021-09-19
Attending: NURSE PRACTITIONER
Payer: MEDICAID

## 2021-09-19 LAB
ANION GAP SERPL CALC-SCNC: 8 MMOL/L (ref 7–16)
BASOPHILS # BLD AUTO: 0.7 % (ref 0–1.8)
BASOPHILS # BLD: 0.05 K/UL (ref 0–0.12)
BUN SERPL-MCNC: 3 MG/DL (ref 8–22)
CALCIUM SERPL-MCNC: 8.7 MG/DL (ref 8.5–10.5)
CHLORIDE SERPL-SCNC: 100 MMOL/L (ref 96–112)
CO2 SERPL-SCNC: 27 MMOL/L (ref 20–33)
CREAT SERPL-MCNC: 0.37 MG/DL (ref 0.5–1.4)
EOSINOPHIL # BLD AUTO: 0.35 K/UL (ref 0–0.51)
EOSINOPHIL NFR BLD: 5.1 % (ref 0–6.9)
ERYTHROCYTE [DISTWIDTH] IN BLOOD BY AUTOMATED COUNT: 59.5 FL (ref 35.9–50)
GLUCOSE SERPL-MCNC: 106 MG/DL (ref 65–99)
HCT VFR BLD AUTO: 32.8 % (ref 37–47)
HGB BLD-MCNC: 10.2 G/DL (ref 12–16)
IMM GRANULOCYTES # BLD AUTO: 0.04 K/UL (ref 0–0.11)
IMM GRANULOCYTES NFR BLD AUTO: 0.6 % (ref 0–0.9)
LYMPHOCYTES # BLD AUTO: 1.2 K/UL (ref 1–4.8)
LYMPHOCYTES NFR BLD: 17.4 % (ref 22–41)
MCH RBC QN AUTO: 29.9 PG (ref 27–33)
MCHC RBC AUTO-ENTMCNC: 31.1 G/DL (ref 33.6–35)
MCV RBC AUTO: 96.2 FL (ref 81.4–97.8)
MONOCYTES # BLD AUTO: 0.76 K/UL (ref 0–0.85)
MONOCYTES NFR BLD AUTO: 11 % (ref 0–13.4)
NEUTROPHILS # BLD AUTO: 4.51 K/UL (ref 2–7.15)
NEUTROPHILS NFR BLD: 65.2 % (ref 44–72)
NRBC # BLD AUTO: 0 K/UL
NRBC BLD-RTO: 0 /100 WBC
PLATELET # BLD AUTO: 582 K/UL (ref 164–446)
PMV BLD AUTO: 9.5 FL (ref 9–12.9)
POTASSIUM SERPL-SCNC: 3.9 MMOL/L (ref 3.6–5.5)
RBC # BLD AUTO: 3.41 M/UL (ref 4.2–5.4)
SODIUM SERPL-SCNC: 135 MMOL/L (ref 135–145)
WBC # BLD AUTO: 6.9 K/UL (ref 4.8–10.8)

## 2021-09-19 PROCEDURE — 94669 MECHANICAL CHEST WALL OSCILL: CPT

## 2021-09-19 PROCEDURE — A9270 NON-COVERED ITEM OR SERVICE: HCPCS

## 2021-09-19 PROCEDURE — 700111 HCHG RX REV CODE 636 W/ 250 OVERRIDE (IP): Performed by: SPECIALIST

## 2021-09-19 PROCEDURE — 700102 HCHG RX REV CODE 250 W/ 637 OVERRIDE(OP): Performed by: NURSE PRACTITIONER

## 2021-09-19 PROCEDURE — A9270 NON-COVERED ITEM OR SERVICE: HCPCS | Performed by: SURGERY

## 2021-09-19 PROCEDURE — 74018 RADEX ABDOMEN 1 VIEW: CPT

## 2021-09-19 PROCEDURE — 80048 BASIC METABOLIC PNL TOTAL CA: CPT

## 2021-09-19 PROCEDURE — 36415 COLL VENOUS BLD VENIPUNCTURE: CPT

## 2021-09-19 PROCEDURE — A9270 NON-COVERED ITEM OR SERVICE: HCPCS | Performed by: NURSE PRACTITIONER

## 2021-09-19 PROCEDURE — 700111 HCHG RX REV CODE 636 W/ 250 OVERRIDE (IP): Performed by: SURGERY

## 2021-09-19 PROCEDURE — 94760 N-INVAS EAR/PLS OXIMETRY 1: CPT

## 2021-09-19 PROCEDURE — 85025 COMPLETE CBC W/AUTO DIFF WBC: CPT

## 2021-09-19 PROCEDURE — 770001 HCHG ROOM/CARE - MED/SURG/GYN PRIV*

## 2021-09-19 PROCEDURE — 700102 HCHG RX REV CODE 250 W/ 637 OVERRIDE(OP): Performed by: SURGERY

## 2021-09-19 PROCEDURE — 700105 HCHG RX REV CODE 258: Performed by: SPECIALIST

## 2021-09-19 PROCEDURE — 71045 X-RAY EXAM CHEST 1 VIEW: CPT

## 2021-09-19 PROCEDURE — 700102 HCHG RX REV CODE 250 W/ 637 OVERRIDE(OP)

## 2021-09-19 RX ORDER — BISACODYL 10 MG
10 SUPPOSITORY, RECTAL RECTAL ONCE
Status: COMPLETED | OUTPATIENT
Start: 2021-09-19 | End: 2021-09-19

## 2021-09-19 RX ADMIN — ENOXAPARIN SODIUM 30 MG: 30 INJECTION SUBCUTANEOUS at 17:02

## 2021-09-19 RX ADMIN — DOCUSATE SODIUM 100 MG: 100 CAPSULE, LIQUID FILLED ORAL at 18:13

## 2021-09-19 RX ADMIN — OXYCODONE 5 MG: 5 TABLET ORAL at 08:22

## 2021-09-19 RX ADMIN — OXYCODONE 5 MG: 5 TABLET ORAL at 15:19

## 2021-09-19 RX ADMIN — DOCUSATE SODIUM 50 MG AND SENNOSIDES 8.6 MG 1 TABLET: 8.6; 5 TABLET, FILM COATED ORAL at 19:55

## 2021-09-19 RX ADMIN — PIPERACILLIN AND TAZOBACTAM 3.38 G: 3; .375 INJECTION, POWDER, LYOPHILIZED, FOR SOLUTION INTRAVENOUS; PARENTERAL at 19:55

## 2021-09-19 RX ADMIN — QUETIAPINE FUMARATE 50 MG: 100 TABLET ORAL at 04:31

## 2021-09-19 RX ADMIN — BISACODYL 10 MG: 10 SUPPOSITORY RECTAL at 18:14

## 2021-09-19 RX ADMIN — PIPERACILLIN AND TAZOBACTAM 3.38 G: 3; .375 INJECTION, POWDER, LYOPHILIZED, FOR SOLUTION INTRAVENOUS; PARENTERAL at 12:08

## 2021-09-19 RX ADMIN — OXYCODONE 5 MG: 5 TABLET ORAL at 19:54

## 2021-09-19 RX ADMIN — QUETIAPINE FUMARATE 100 MG: 100 TABLET ORAL at 19:55

## 2021-09-19 RX ADMIN — POLYETHYLENE GLYCOL 3350 1 PACKET: 17 POWDER, FOR SOLUTION ORAL at 04:33

## 2021-09-19 RX ADMIN — POLYETHYLENE GLYCOL 3350 1 PACKET: 17 POWDER, FOR SOLUTION ORAL at 18:13

## 2021-09-19 RX ADMIN — OXYCODONE 5 MG: 5 TABLET ORAL at 00:52

## 2021-09-19 RX ADMIN — OXYCODONE 5 MG: 5 TABLET ORAL at 12:08

## 2021-09-19 RX ADMIN — PIPERACILLIN AND TAZOBACTAM 3.38 G: 3; .375 INJECTION, POWDER, LYOPHILIZED, FOR SOLUTION INTRAVENOUS; PARENTERAL at 03:54

## 2021-09-19 ASSESSMENT — PAIN DESCRIPTION - PAIN TYPE
TYPE: ACUTE PAIN

## 2021-09-19 ASSESSMENT — ENCOUNTER SYMPTOMS
WHEEZING: 0
COUGH: 0
CHILLS: 0
BLURRED VISION: 0
FEVER: 0
DOUBLE VISION: 0
TINGLING: 0
SHORTNESS OF BREATH: 1
FOCAL WEAKNESS: 0
DIAPHORESIS: 0
MYALGIAS: 0
BACK PAIN: 0
ROS GI COMMENTS: LAST BM 9/19
SENSORY CHANGE: 0
CONSTIPATION: 0
SPUTUM PRODUCTION: 0
ABDOMINAL PAIN: 0
DIZZINESS: 0
HEADACHES: 0
WEAKNESS: 0
VOMITING: 0
NECK PAIN: 1
NAUSEA: 0

## 2021-09-19 NOTE — CARE PLAN
Problem: Knowledge Deficit - Standard  Goal: Patient and family/care givers will demonstrate understanding of plan of care, disease process/condition, diagnostic tests and medications  Outcome: Progressing     Problem: Skin Integrity  Goal: Skin integrity is maintained or improved  Outcome: Progressing      Shift Goals  Clinical Goals: pain   Patient Goals: pain  Family Goals: N/A    Progress made toward(s) clinical / shift goals:  pt updated on poc, pt verbalizes understanding.     Patient is not progressing towards the following goals:
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
## 2021-09-19 NOTE — PROGRESS NOTES
Bedside report received.  Assessment complete.  A&O x4. Patient calls appropriately.  Patient ambulates with standby assist.  Patient has 9/10 pain. Medicated per MAR.   MLI with percustay. CDI.R chest tube site with gauze and hypafix. CDI. Suprapubic RUDI drain (#2) under same midline dressing. Putting out brown fluid. R LP drain (#1) putting out serosang fluid.   Tolerating regular diet. Denies N/V.  + void, + flatus, + BM. Last BM 9/17.  Review plan with of care with patient. Call light and personal belongings within reach. Hourly rounding in place. All needs met at this time.

## 2021-09-20 ENCOUNTER — APPOINTMENT (OUTPATIENT)
Dept: RADIOLOGY | Facility: MEDICAL CENTER | Age: 36
DRG: 957 | End: 2021-09-20
Attending: SPECIALIST
Payer: MEDICAID

## 2021-09-20 LAB
ALBUMIN SERPL BCP-MCNC: 2.4 G/DL (ref 3.2–4.9)
ALBUMIN/GLOB SERPL: 0.7 G/DL
ALP SERPL-CCNC: 126 U/L (ref 30–99)
ALT SERPL-CCNC: 15 U/L (ref 2–50)
ANION GAP SERPL CALC-SCNC: 9 MMOL/L (ref 7–16)
AST SERPL-CCNC: 17 U/L (ref 12–45)
BACTERIA FLD AEROBE CULT: NORMAL
BACTERIA FLD AEROBE CULT: NORMAL
BASOPHILS # BLD AUTO: 0.7 % (ref 0–1.8)
BASOPHILS # BLD: 0.04 K/UL (ref 0–0.12)
BILIRUB SERPL-MCNC: <0.2 MG/DL (ref 0.1–1.5)
BUN SERPL-MCNC: 4 MG/DL (ref 8–22)
CALCIUM SERPL-MCNC: 8.8 MG/DL (ref 8.5–10.5)
CHLORIDE SERPL-SCNC: 101 MMOL/L (ref 96–112)
CO2 SERPL-SCNC: 27 MMOL/L (ref 20–33)
CREAT SERPL-MCNC: 0.38 MG/DL (ref 0.5–1.4)
EOSINOPHIL # BLD AUTO: 0.22 K/UL (ref 0–0.51)
EOSINOPHIL NFR BLD: 3.8 % (ref 0–6.9)
ERYTHROCYTE [DISTWIDTH] IN BLOOD BY AUTOMATED COUNT: 59.3 FL (ref 35.9–50)
GLOBULIN SER CALC-MCNC: 3.4 G/DL (ref 1.9–3.5)
GLUCOSE SERPL-MCNC: 165 MG/DL (ref 65–99)
GRAM STN SPEC: NORMAL
GRAM STN SPEC: NORMAL
HCT VFR BLD AUTO: 31.4 % (ref 37–47)
HGB BLD-MCNC: 9.9 G/DL (ref 12–16)
IMM GRANULOCYTES # BLD AUTO: 0.02 K/UL (ref 0–0.11)
IMM GRANULOCYTES NFR BLD AUTO: 0.3 % (ref 0–0.9)
LYMPHOCYTES # BLD AUTO: 1.01 K/UL (ref 1–4.8)
LYMPHOCYTES NFR BLD: 17.4 % (ref 22–41)
MCH RBC QN AUTO: 30.5 PG (ref 27–33)
MCHC RBC AUTO-ENTMCNC: 31.5 G/DL (ref 33.6–35)
MCV RBC AUTO: 96.6 FL (ref 81.4–97.8)
MONOCYTES # BLD AUTO: 0.55 K/UL (ref 0–0.85)
MONOCYTES NFR BLD AUTO: 9.5 % (ref 0–13.4)
NEUTROPHILS # BLD AUTO: 3.97 K/UL (ref 2–7.15)
NEUTROPHILS NFR BLD: 68.3 % (ref 44–72)
NRBC # BLD AUTO: 0 K/UL
NRBC BLD-RTO: 0 /100 WBC
PLATELET # BLD AUTO: 546 K/UL (ref 164–446)
PMV BLD AUTO: 9.3 FL (ref 9–12.9)
POTASSIUM SERPL-SCNC: 3.6 MMOL/L (ref 3.6–5.5)
PROT SERPL-MCNC: 5.8 G/DL (ref 6–8.2)
RBC # BLD AUTO: 3.25 M/UL (ref 4.2–5.4)
SIGNIFICANT IND 70042: NORMAL
SIGNIFICANT IND 70042: NORMAL
SITE SITE: NORMAL
SITE SITE: NORMAL
SODIUM SERPL-SCNC: 137 MMOL/L (ref 135–145)
SOURCE SOURCE: NORMAL
SOURCE SOURCE: NORMAL
WBC # BLD AUTO: 5.8 K/UL (ref 4.8–10.8)

## 2021-09-20 PROCEDURE — 770001 HCHG ROOM/CARE - MED/SURG/GYN PRIV*

## 2021-09-20 PROCEDURE — 700102 HCHG RX REV CODE 250 W/ 637 OVERRIDE(OP): Performed by: NURSE PRACTITIONER

## 2021-09-20 PROCEDURE — A9270 NON-COVERED ITEM OR SERVICE: HCPCS | Performed by: NURSE PRACTITIONER

## 2021-09-20 PROCEDURE — 700111 HCHG RX REV CODE 636 W/ 250 OVERRIDE (IP): Performed by: SPECIALIST

## 2021-09-20 PROCEDURE — 700105 HCHG RX REV CODE 258: Performed by: SPECIALIST

## 2021-09-20 PROCEDURE — 700111 HCHG RX REV CODE 636 W/ 250 OVERRIDE (IP): Performed by: SURGERY

## 2021-09-20 PROCEDURE — 94669 MECHANICAL CHEST WALL OSCILL: CPT

## 2021-09-20 PROCEDURE — 71045 X-RAY EXAM CHEST 1 VIEW: CPT

## 2021-09-20 PROCEDURE — 36415 COLL VENOUS BLD VENIPUNCTURE: CPT

## 2021-09-20 PROCEDURE — A9270 NON-COVERED ITEM OR SERVICE: HCPCS | Performed by: SURGERY

## 2021-09-20 PROCEDURE — 700102 HCHG RX REV CODE 250 W/ 637 OVERRIDE(OP): Performed by: SURGERY

## 2021-09-20 PROCEDURE — 85025 COMPLETE CBC W/AUTO DIFF WBC: CPT

## 2021-09-20 PROCEDURE — 99231 SBSQ HOSP IP/OBS SF/LOW 25: CPT | Mod: 24

## 2021-09-20 PROCEDURE — 80053 COMPREHEN METABOLIC PANEL: CPT

## 2021-09-20 RX ADMIN — OXYCODONE 5 MG: 5 TABLET ORAL at 20:38

## 2021-09-20 RX ADMIN — QUETIAPINE FUMARATE 100 MG: 100 TABLET ORAL at 20:16

## 2021-09-20 RX ADMIN — ENOXAPARIN SODIUM 30 MG: 30 INJECTION SUBCUTANEOUS at 18:08

## 2021-09-20 RX ADMIN — OXYCODONE 5 MG: 5 TABLET ORAL at 10:20

## 2021-09-20 RX ADMIN — DOCUSATE SODIUM 100 MG: 100 CAPSULE, LIQUID FILLED ORAL at 04:53

## 2021-09-20 RX ADMIN — ENOXAPARIN SODIUM 30 MG: 30 INJECTION SUBCUTANEOUS at 04:54

## 2021-09-20 RX ADMIN — OXYCODONE 5 MG: 5 TABLET ORAL at 18:08

## 2021-09-20 RX ADMIN — OXYCODONE 5 MG: 5 TABLET ORAL at 01:58

## 2021-09-20 RX ADMIN — POLYETHYLENE GLYCOL 3350 1 PACKET: 17 POWDER, FOR SOLUTION ORAL at 04:54

## 2021-09-20 RX ADMIN — QUETIAPINE FUMARATE 50 MG: 100 TABLET ORAL at 04:54

## 2021-09-20 RX ADMIN — PIPERACILLIN AND TAZOBACTAM 3.38 G: 3; .375 INJECTION, POWDER, LYOPHILIZED, FOR SOLUTION INTRAVENOUS; PARENTERAL at 11:58

## 2021-09-20 RX ADMIN — PIPERACILLIN AND TAZOBACTAM 3.38 G: 3; .375 INJECTION, POWDER, LYOPHILIZED, FOR SOLUTION INTRAVENOUS; PARENTERAL at 04:53

## 2021-09-20 RX ADMIN — PIPERACILLIN AND TAZOBACTAM 3.38 G: 3; .375 INJECTION, POWDER, LYOPHILIZED, FOR SOLUTION INTRAVENOUS; PARENTERAL at 20:16

## 2021-09-20 RX ADMIN — OXYCODONE 5 MG: 5 TABLET ORAL at 14:49

## 2021-09-20 RX ADMIN — MAGNESIUM HYDROXIDE 30 ML: 400 SUSPENSION ORAL at 04:54

## 2021-09-20 ASSESSMENT — ENCOUNTER SYMPTOMS
TINGLING: 0
WEAKNESS: 0
BACK PAIN: 0
BRUISES/BLEEDS EASILY: 0
DIAPHORESIS: 0
SPUTUM PRODUCTION: 0
SHORTNESS OF BREATH: 1
HEADACHES: 0
COUGH: 0
CHILLS: 0
ROS GI COMMENTS: LAST BM 9/19
DOUBLE VISION: 0
ABDOMINAL PAIN: 0
PALPITATIONS: 0
VOMITING: 0
NECK PAIN: 1
WHEEZING: 0
BLURRED VISION: 0
HEMOPTYSIS: 0
MYALGIAS: 0
DIZZINESS: 0
NECK PAIN: 0
SENSORY CHANGE: 0
FEVER: 0
NAUSEA: 0
SHORTNESS OF BREATH: 0
CONSTIPATION: 0
FOCAL WEAKNESS: 0

## 2021-09-20 ASSESSMENT — PAIN DESCRIPTION - PAIN TYPE
TYPE: ACUTE PAIN

## 2021-09-20 NOTE — PROGRESS NOTES
Bedside report received.  Assessment complete.  A&O x4. Patient calls appropriately.  Patient ambulates with standby assist.  Patient has 10/10 pain. Medicated per MAR.   Tolerating regular diet. Denies N/V.  + void, + flatus, + BM. Last BM 9/19.  Review plan with of care with patient. Call light and personal belongings within reach. Hourly rounding in place. All needs met at this time.     COVID-19 surge in effect.

## 2021-09-20 NOTE — CARE PLAN
The patient is Stable - Low risk of patient condition declining or worsening    Shift Goals  Clinical Goals: pain control  Patient Goals: pain  Family Goals: N/A    Progress made toward(s) clinical / shift goals:  Telesitter in place for safety. Medicated for pain; see MAR. Patient drinking fluids frequently. X 2 large incontinent episodes over shift noted. Dressings changed to CT/RUDI/IR drain. Reddened skin noted around RUDI drain. Small output noted on drains.     Patient is not progressing towards the following goals: NA.
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
## 2021-09-21 ENCOUNTER — APPOINTMENT (OUTPATIENT)
Dept: RADIOLOGY | Facility: MEDICAL CENTER | Age: 36
DRG: 957 | End: 2021-09-21
Attending: SPECIALIST
Payer: MEDICAID

## 2021-09-21 PROBLEM — R18.8 INTRAABDOMINAL FLUID COLLECTION: Status: ACTIVE | Noted: 2021-09-21

## 2021-09-21 LAB
BACTERIA BLD CULT: NORMAL
BACTERIA BLD CULT: NORMAL
SIGNIFICANT IND 70042: NORMAL
SIGNIFICANT IND 70042: NORMAL
SITE SITE: NORMAL
SITE SITE: NORMAL
SOURCE SOURCE: NORMAL
SOURCE SOURCE: NORMAL

## 2021-09-21 PROCEDURE — 700111 HCHG RX REV CODE 636 W/ 250 OVERRIDE (IP): Performed by: SPECIALIST

## 2021-09-21 PROCEDURE — 71045 X-RAY EXAM CHEST 1 VIEW: CPT

## 2021-09-21 PROCEDURE — 99231 SBSQ HOSP IP/OBS SF/LOW 25: CPT | Mod: 24

## 2021-09-21 PROCEDURE — 700102 HCHG RX REV CODE 250 W/ 637 OVERRIDE(OP): Performed by: NURSE PRACTITIONER

## 2021-09-21 PROCEDURE — 700105 HCHG RX REV CODE 258: Performed by: SPECIALIST

## 2021-09-21 PROCEDURE — 94669 MECHANICAL CHEST WALL OSCILL: CPT

## 2021-09-21 PROCEDURE — 700111 HCHG RX REV CODE 636 W/ 250 OVERRIDE (IP): Performed by: SURGERY

## 2021-09-21 PROCEDURE — 700102 HCHG RX REV CODE 250 W/ 637 OVERRIDE(OP): Performed by: SURGERY

## 2021-09-21 PROCEDURE — 302146: Performed by: SURGERY

## 2021-09-21 PROCEDURE — A9270 NON-COVERED ITEM OR SERVICE: HCPCS | Performed by: SURGERY

## 2021-09-21 PROCEDURE — A9270 NON-COVERED ITEM OR SERVICE: HCPCS | Performed by: NURSE PRACTITIONER

## 2021-09-21 PROCEDURE — 94760 N-INVAS EAR/PLS OXIMETRY 1: CPT

## 2021-09-21 PROCEDURE — 770001 HCHG ROOM/CARE - MED/SURG/GYN PRIV*

## 2021-09-21 RX ADMIN — DOCUSATE SODIUM 50 MG AND SENNOSIDES 8.6 MG 1 TABLET: 8.6; 5 TABLET, FILM COATED ORAL at 20:37

## 2021-09-21 RX ADMIN — OXYCODONE 5 MG: 5 TABLET ORAL at 05:33

## 2021-09-21 RX ADMIN — DOCUSATE SODIUM 100 MG: 100 CAPSULE, LIQUID FILLED ORAL at 05:20

## 2021-09-21 RX ADMIN — MAGNESIUM HYDROXIDE 30 ML: 400 SUSPENSION ORAL at 05:20

## 2021-09-21 RX ADMIN — POLYETHYLENE GLYCOL 3350 1 PACKET: 17 POWDER, FOR SOLUTION ORAL at 17:41

## 2021-09-21 RX ADMIN — ENOXAPARIN SODIUM 30 MG: 30 INJECTION SUBCUTANEOUS at 05:20

## 2021-09-21 RX ADMIN — POLYETHYLENE GLYCOL 3350 1 PACKET: 17 POWDER, FOR SOLUTION ORAL at 05:21

## 2021-09-21 RX ADMIN — OXYCODONE 5 MG: 5 TABLET ORAL at 12:15

## 2021-09-21 RX ADMIN — OXYCODONE 5 MG: 5 TABLET ORAL at 01:46

## 2021-09-21 RX ADMIN — ENOXAPARIN SODIUM 30 MG: 30 INJECTION SUBCUTANEOUS at 17:41

## 2021-09-21 RX ADMIN — OXYCODONE 5 MG: 5 TABLET ORAL at 20:37

## 2021-09-21 RX ADMIN — OXYCODONE 5 MG: 5 TABLET ORAL at 17:41

## 2021-09-21 RX ADMIN — QUETIAPINE FUMARATE 50 MG: 100 TABLET ORAL at 05:20

## 2021-09-21 RX ADMIN — PIPERACILLIN AND TAZOBACTAM 3.38 G: 3; .375 INJECTION, POWDER, LYOPHILIZED, FOR SOLUTION INTRAVENOUS; PARENTERAL at 12:15

## 2021-09-21 RX ADMIN — PIPERACILLIN AND TAZOBACTAM 3.38 G: 3; .375 INJECTION, POWDER, LYOPHILIZED, FOR SOLUTION INTRAVENOUS; PARENTERAL at 20:38

## 2021-09-21 RX ADMIN — PIPERACILLIN AND TAZOBACTAM 3.38 G: 3; .375 INJECTION, POWDER, LYOPHILIZED, FOR SOLUTION INTRAVENOUS; PARENTERAL at 05:21

## 2021-09-21 RX ADMIN — QUETIAPINE FUMARATE 100 MG: 100 TABLET ORAL at 20:37

## 2021-09-21 RX ADMIN — DOCUSATE SODIUM 100 MG: 100 CAPSULE, LIQUID FILLED ORAL at 17:41

## 2021-09-21 RX ADMIN — ACETAMINOPHEN 650 MG: 325 TABLET, FILM COATED ORAL at 20:40

## 2021-09-21 ASSESSMENT — PAIN DESCRIPTION - PAIN TYPE
TYPE: ACUTE PAIN

## 2021-09-21 ASSESSMENT — ENCOUNTER SYMPTOMS
SHORTNESS OF BREATH: 0
NECK PAIN: 1
COUGH: 0
WEAKNESS: 0
SPUTUM PRODUCTION: 0
PALPITATIONS: 0
DIAPHORESIS: 0
BRUISES/BLEEDS EASILY: 0
VOMITING: 0
EYES NEGATIVE: 1
BLURRED VISION: 0
COUGH: 1
SENSORY CHANGE: 0
CHILLS: 0
FOCAL WEAKNESS: 0
SHORTNESS OF BREATH: 1
TINGLING: 0
ABDOMINAL PAIN: 0
MYALGIAS: 0
WHEEZING: 0
CONSTIPATION: 0
HEMOPTYSIS: 0
HEADACHES: 0
ROS GI COMMENTS: LAST BM 9/19
DIZZINESS: 0
NAUSEA: 0
BACK PAIN: 0
FEVER: 0

## 2021-09-21 NOTE — CARE PLAN
The patient is Stable - Low risk of patient condition declining or worsening    Shift Goals  Clinical Goals: Pain control, IS use, rest  Patient Goals: Pain control, rest.   Family Goals: N/A    Progress made toward(s) clinical / shift goals: Medicated for pain; see MAR. IS encouraged/demonstrated. Patient resting intermittently during shift.     Patient is not progressing towards the following goals:    Problem: Respiratory  Goal: Patient will achieve/maintain optimum respiratory ventilation and gas exchange  Outcome: Not Progressing  IS amount decreasing with use. Fine crackles; clears with coughing. TCDB/IS encouraged. Patient drinks lots of fluids. No swallowing difficulties noted. Uncertain if silently aspirating. Will request SLP consult. No output noted in CT cannister. Increasing flank pain reported.

## 2021-09-21 NOTE — ASSESSMENT & PLAN NOTE
9/17 2 JPs placed in IR.  9/21 RUDI #1 output 80mL, URDI # 2 output 40mL Zosyn completed.  9/23 RUDI # 1 removed.  9/24 RUDI #2 removed.
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
## 2021-09-21 NOTE — PROGRESS NOTES
Received report from AM RN; assumed care. Patient talking with sister on phone at change of shift. Patients mother calling frequently. Patient instructed to call mom when finished. A&O x 4, flat affect noted. VSS, consistently tachycardic. 103-120s noted. 96% on 2L NC. Medicated for pain; see MAR. Patient denies SOB, numbness, tingling, nausea, vomiting, dizziness. Medicated for pain; see MAR. Fine crackles noted upper lung fields; clears with coughing. Patient appears to be swallowing without issue. Patient drinking lots of fluids. She states fluids goes right through her and has all her life. Patient decreased ability to use IS. C-collar in place at all times for 6 weeks. Mepliex to upper back for skin protection. MLI well approximated with dermabond; scabbing noted. Abdomen round, tender near CT/IR sites. Right upper flank CT to water seal; CDI dressing. Scant drainage noted. RUDI drain to mid-right abdomen; small dark red drainage noted. IR drain to lower right abdomen; CDI dressing. Small serosang drainage noted. Flushing x 2 during shift with 10 mL NS; withdrawing. Increasing abdominal distention/flank pain reported. Hypoactive BS x 4 noted. + eructation. + flatus. LBM 09/19. + voiding in toilet and in bed. Linens changed. Patient reported tolerating diet. Patient up self, steady gait noted. Telesitter in place for safety. POC discussed. Questions answered. Bed locked/lowest position. Call light/personal belongings within reach. All needs met/patient sleeping at present time.

## 2021-09-21 NOTE — PROGRESS NOTES
AA&Ox4.    SpO2 92% on 2L O2 via NC. Denies SOB.    Reporting 9/10 pain. Medicated per MAR.    SKIN MLI with Dermabond RENEE, CDI. Chest tube and IR sites to the right side, CDI.     Drains RLQ and RUQ IR drain compressed to self suction. Draining SS output. Chest tube to the Right side.     Tolerating  Regular diet. Denies N/V.    + void.    + BM / LBM 9/22/2021.    Pt ambulates/up with Stand by assist and FFW.    All needs met at this time. Call light within reach. Pt calls appropriately.
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
## 2021-09-22 ENCOUNTER — HOSPITAL ENCOUNTER (OUTPATIENT)
Dept: RADIOLOGY | Facility: MEDICAL CENTER | Age: 36
End: 2021-09-22
Attending: SPECIALIST
Payer: COMMERCIAL

## 2021-09-22 LAB
ANION GAP SERPL CALC-SCNC: 8 MMOL/L (ref 7–16)
BASOPHILS # BLD AUTO: 0.9 % (ref 0–1.8)
BASOPHILS # BLD: 0.05 K/UL (ref 0–0.12)
BUN SERPL-MCNC: 5 MG/DL (ref 8–22)
CALCIUM SERPL-MCNC: 9.2 MG/DL (ref 8.5–10.5)
CHLORIDE SERPL-SCNC: 100 MMOL/L (ref 96–112)
CO2 SERPL-SCNC: 29 MMOL/L (ref 20–33)
CREAT SERPL-MCNC: 0.39 MG/DL (ref 0.5–1.4)
EOSINOPHIL # BLD AUTO: 0.28 K/UL (ref 0–0.51)
EOSINOPHIL NFR BLD: 4.9 % (ref 0–6.9)
ERYTHROCYTE [DISTWIDTH] IN BLOOD BY AUTOMATED COUNT: 60.3 FL (ref 35.9–50)
GLUCOSE SERPL-MCNC: 93 MG/DL (ref 65–99)
HCT VFR BLD AUTO: 33.8 % (ref 37–47)
HGB BLD-MCNC: 10.5 G/DL (ref 12–16)
IMM GRANULOCYTES # BLD AUTO: 0.03 K/UL (ref 0–0.11)
IMM GRANULOCYTES NFR BLD AUTO: 0.5 % (ref 0–0.9)
LYMPHOCYTES # BLD AUTO: 0.98 K/UL (ref 1–4.8)
LYMPHOCYTES NFR BLD: 17.2 % (ref 22–41)
MCH RBC QN AUTO: 30.5 PG (ref 27–33)
MCHC RBC AUTO-ENTMCNC: 31.1 G/DL (ref 33.6–35)
MCV RBC AUTO: 98.3 FL (ref 81.4–97.8)
MONOCYTES # BLD AUTO: 0.73 K/UL (ref 0–0.85)
MONOCYTES NFR BLD AUTO: 12.8 % (ref 0–13.4)
NEUTROPHILS # BLD AUTO: 3.63 K/UL (ref 2–7.15)
NEUTROPHILS NFR BLD: 63.7 % (ref 44–72)
NRBC # BLD AUTO: 0 K/UL
NRBC BLD-RTO: 0 /100 WBC
PLATELET # BLD AUTO: 544 K/UL (ref 164–446)
PMV BLD AUTO: 9.2 FL (ref 9–12.9)
POTASSIUM SERPL-SCNC: 4.1 MMOL/L (ref 3.6–5.5)
RBC # BLD AUTO: 3.44 M/UL (ref 4.2–5.4)
SODIUM SERPL-SCNC: 137 MMOL/L (ref 135–145)
WBC # BLD AUTO: 5.7 K/UL (ref 4.8–10.8)

## 2021-09-22 PROCEDURE — A9270 NON-COVERED ITEM OR SERVICE: HCPCS | Performed by: NURSE PRACTITIONER

## 2021-09-22 PROCEDURE — 85025 COMPLETE CBC W/AUTO DIFF WBC: CPT

## 2021-09-22 PROCEDURE — 92610 EVALUATE SWALLOWING FUNCTION: CPT

## 2021-09-22 PROCEDURE — 71045 X-RAY EXAM CHEST 1 VIEW: CPT

## 2021-09-22 PROCEDURE — 36415 COLL VENOUS BLD VENIPUNCTURE: CPT

## 2021-09-22 PROCEDURE — 80048 BASIC METABOLIC PNL TOTAL CA: CPT

## 2021-09-22 PROCEDURE — 770001 HCHG ROOM/CARE - MED/SURG/GYN PRIV*

## 2021-09-22 PROCEDURE — 94669 MECHANICAL CHEST WALL OSCILL: CPT

## 2021-09-22 PROCEDURE — 700111 HCHG RX REV CODE 636 W/ 250 OVERRIDE (IP): Performed by: SURGERY

## 2021-09-22 PROCEDURE — 700102 HCHG RX REV CODE 250 W/ 637 OVERRIDE(OP): Performed by: NURSE PRACTITIONER

## 2021-09-22 PROCEDURE — 700102 HCHG RX REV CODE 250 W/ 637 OVERRIDE(OP): Performed by: SURGERY

## 2021-09-22 PROCEDURE — A9270 NON-COVERED ITEM OR SERVICE: HCPCS | Performed by: SURGERY

## 2021-09-22 PROCEDURE — 99231 SBSQ HOSP IP/OBS SF/LOW 25: CPT | Mod: 24 | Performed by: NURSE PRACTITIONER

## 2021-09-22 PROCEDURE — 94760 N-INVAS EAR/PLS OXIMETRY 1: CPT

## 2021-09-22 RX ADMIN — POLYETHYLENE GLYCOL 3350 1 PACKET: 17 POWDER, FOR SOLUTION ORAL at 17:56

## 2021-09-22 RX ADMIN — OXYCODONE 5 MG: 5 TABLET ORAL at 17:56

## 2021-09-22 RX ADMIN — OXYCODONE 5 MG: 5 TABLET ORAL at 14:44

## 2021-09-22 RX ADMIN — ENOXAPARIN SODIUM 30 MG: 30 INJECTION SUBCUTANEOUS at 04:40

## 2021-09-22 RX ADMIN — QUETIAPINE FUMARATE 50 MG: 100 TABLET ORAL at 04:39

## 2021-09-22 RX ADMIN — ACETAMINOPHEN 650 MG: 325 TABLET, FILM COATED ORAL at 03:50

## 2021-09-22 RX ADMIN — QUETIAPINE FUMARATE 100 MG: 100 TABLET ORAL at 19:42

## 2021-09-22 RX ADMIN — DOCUSATE SODIUM 100 MG: 100 CAPSULE, LIQUID FILLED ORAL at 17:55

## 2021-09-22 RX ADMIN — OXYCODONE 5 MG: 5 TABLET ORAL at 03:49

## 2021-09-22 RX ADMIN — ENOXAPARIN SODIUM 30 MG: 30 INJECTION SUBCUTANEOUS at 17:55

## 2021-09-22 RX ADMIN — OXYCODONE 5 MG: 5 TABLET ORAL at 09:28

## 2021-09-22 RX ADMIN — DOCUSATE SODIUM 50 MG AND SENNOSIDES 8.6 MG 1 TABLET: 8.6; 5 TABLET, FILM COATED ORAL at 19:43

## 2021-09-22 ASSESSMENT — ENCOUNTER SYMPTOMS
WEAKNESS: 0
COUGH: 0
ABDOMINAL PAIN: 0
BLURRED VISION: 0
SHORTNESS OF BREATH: 0
ROS GI COMMENTS: LAST BOWEL MOVEMENT 9/22
HEADACHES: 0
MYALGIAS: 0
NECK PAIN: 1
COUGH: 1
SHORTNESS OF BREATH: 1
BRUISES/BLEEDS EASILY: 0
SPUTUM PRODUCTION: 1
PALPITATIONS: 0
CHILLS: 0
VOMITING: 0
HEMOPTYSIS: 0
DIZZINESS: 0
FEVER: 0

## 2021-09-22 ASSESSMENT — PAIN DESCRIPTION - PAIN TYPE
TYPE: ACUTE PAIN

## 2021-09-22 NOTE — THERAPY
"Speech Language Pathology   Clinical Swallow Evaluation     Patient Name: Yris Edwards  AGE:  36 y.o., SEX:  female  Medical Record #: [id number]  Today's Date: 9/22/2021     Precautions  Precautions: Fall Risk, Swallow Precautions ( See Comments), Cervical Collar  , Spinal / Back Precautions   Comments: C-Collar on at all times x 6 weeks    Assessment  Patient seen this date for clinical swallow evaluation. Patient currently on a regular diet and per RN, no overt difficulty noted, but patient has crackly lung sounds. Recent CXR reports \"Stable right pigtail chest tube, small right pleural effusion and right basilar opacity, atelectasis, contusion and/or infection. Mild blunting of left costophrenic angle could be related to atelectasis and/or trace effusion and is unchanged. No pneumothorax.\" Patient had no gross deficits during oral motor evaluation with the exception of edentulism. Patient reported she can chew \"anything\" when asked. Patient consumed PO trials of single ice chips, MTL via tsp and cup sip, purees, pudding, soft solids, mixed consistencies, crackers, and thin liquids via cup sip and straw. Patient consumed all PO trials with no overt s/sx aspiration. Intermittent congested-sounding cough was noted throughout evaluation, but this appeared unrelated to PO trials, as it even occurred prior to any PO. Laryngeal elevation unable to be assessed d/t C-collar pads enclosing anterior neck area.     Recommend patient continue regular diet w/ thin liquids. Ok for meds whole w/ thin liquid wash. Straw sips ok. SLP to follow at least 1-2x to ensure diet tolerance.     Plan  Recommend Speech Therapy 3 times per week until therapy goals are met for the following treatments:  Dysphagia Training and Patient / Family / Caregiver Education.    Discharge Recommendations: Anticipate that the patient will have no further speech therapy needs after discharge from the hospital     Objective     09/22/21 0920   Precautions "   Precautions Fall Risk;Swallow Precautions ( See Comments);Cervical Collar  ;Spinal / Back Precautions    Vitals   O2 (LPM) 3.5   O2 Delivery Device Silicone Nasal Cannula   Oral Motor Eval    Is Patient Able to Complete Oral Motor Eval Yes, Within Normal Limits   Laryngeal Function   Voice Quality Within Functional Limits   Volutional Cough Within Functional Limits   Excursion Upon Swallow   (Unable to be assessed d/t C collar )   Max Phonation Time (Seconds) 5   Oral Food Presentation   Ice Chips Within Functional Limits   Single Swallow Mildly Thick (2) - (Nectar Thick)  Within Functional Limits   Serial Swallow Mildly Thick (2) - (Nectar Thick) Within Functional Limits   Single Swallow Thin (0) Within Functional Limits   Serial Swallow Thin (0) Within Functional Limits   Liquidised (3) Within Functional Limits   Pureed (4) Within Functional Limits   Soft & Bite-Sized (6) - (Dysphagia III) Within Functional Limits   Regular (7) Within Functional Limits   Regular-Easy to Chew (7) Within Functional Limits   Self Feeding Independent   Dysphagia Strategies / Recommendations   Strategies / Interventions Recommended (Yes / No) Yes   Compensatory Strategies Head of Bed 90 Degrees During Eating / Drinking;Monitor During Meals;Single Sips / Bites   Diet / Liquid Recommendation Regular (7);Thin (0)   Medication Administration  Whole with Liquid Wash   Therapy Interventions Dysphagia Therapy By Speech Language Pathologist   Short Term Goals   Short Term Goal # 1 Patient will consume regular diet w/ thin liquids with no overt s/sx of aspiration.    Anticipated Discharge Needs   Discharge Recommendations Anticipate that the patient will have no further speech therapy needs after discharge from the hospital
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
## 2021-09-22 NOTE — CARE PLAN
The patient is Stable - Low risk of patient condition declining or worsening    Shift Goals  Clinical Goals: pain control  Patient Goals: rest  Family Goals: N/A    Progress made toward(s) clinical / shift goals:      Problem: Knowledge Deficit - Standard  Goal: Patient and family/care givers will demonstrate understanding of plan of care, disease process/condition, diagnostic tests and medications  Outcome: Progressing   Pt was educated on POC, MAR, shift routine and nursing education R/T Dx. Questions were encouraged and answered. Pt verbalized understanding of all teaching.        Problem: Fall Risk  Goal: Patient will remain free from falls  Outcome: Progressing   Pt demonstrates appropriate use of call light to alert staff to needs.      Patient is not progressing towards the following goals:

## 2021-09-22 NOTE — CARE PLAN
The patient is Stable    Shift Goals  Clinical Goals: pain control  Patient Goals: rest  Family Goals: N/A    Progress made toward(s) clinical / shift goals:      Problem: Knowledge Deficit - Standard  Goal: Patient and family/care givers will demonstrate understanding of plan of care, disease process/condition, diagnostic tests and medications  Outcome: Progressing   Pt was educated on POC, MAR, shift routine and nursing education R/T Dx. Questions were encouraged and answered. Pt verbalized understanding of all teaching.        Problem: Pain - Standard  Goal: Alleviation of pain or a reduction in pain to the patient’s comfort goal  Outcome: Progressing   Pt was educated on 0-10 pain scale, non-pharm methods of pain relief and MAR. Pt demonstrates understanding by rating pain as a 8 on 0-10 pain scale. Pt states that their pain is well controlled and declines pain medication.     Patient is not progressing towards the following goals:

## 2021-09-23 ENCOUNTER — APPOINTMENT (OUTPATIENT)
Dept: RADIOLOGY | Facility: MEDICAL CENTER | Age: 36
DRG: 957 | End: 2021-09-23
Attending: SPECIALIST
Payer: MEDICAID

## 2021-09-23 ENCOUNTER — APPOINTMENT (OUTPATIENT)
Dept: RADIOLOGY | Facility: MEDICAL CENTER | Age: 36
DRG: 957 | End: 2021-09-23
Attending: NURSE PRACTITIONER
Payer: MEDICAID

## 2021-09-23 PROCEDURE — 74018 RADEX ABDOMEN 1 VIEW: CPT

## 2021-09-23 PROCEDURE — 71045 X-RAY EXAM CHEST 1 VIEW: CPT

## 2021-09-23 PROCEDURE — 99231 SBSQ HOSP IP/OBS SF/LOW 25: CPT | Mod: 24 | Performed by: NURSE PRACTITIONER

## 2021-09-23 PROCEDURE — 700102 HCHG RX REV CODE 250 W/ 637 OVERRIDE(OP): Performed by: NURSE PRACTITIONER

## 2021-09-23 PROCEDURE — A9270 NON-COVERED ITEM OR SERVICE: HCPCS | Performed by: NURSE PRACTITIONER

## 2021-09-23 PROCEDURE — A9270 NON-COVERED ITEM OR SERVICE: HCPCS | Performed by: SURGERY

## 2021-09-23 PROCEDURE — 700102 HCHG RX REV CODE 250 W/ 637 OVERRIDE(OP): Performed by: SURGERY

## 2021-09-23 PROCEDURE — 770001 HCHG ROOM/CARE - MED/SURG/GYN PRIV*

## 2021-09-23 PROCEDURE — 700111 HCHG RX REV CODE 636 W/ 250 OVERRIDE (IP): Performed by: SURGERY

## 2021-09-23 PROCEDURE — 94669 MECHANICAL CHEST WALL OSCILL: CPT

## 2021-09-23 PROCEDURE — 71046 X-RAY EXAM CHEST 2 VIEWS: CPT

## 2021-09-23 RX ADMIN — ALPRAZOLAM 0.25 MG: 0.25 TABLET ORAL at 13:32

## 2021-09-23 RX ADMIN — DOCUSATE SODIUM 100 MG: 100 CAPSULE, LIQUID FILLED ORAL at 04:42

## 2021-09-23 RX ADMIN — DOCUSATE SODIUM 100 MG: 100 CAPSULE, LIQUID FILLED ORAL at 18:59

## 2021-09-23 RX ADMIN — ENOXAPARIN SODIUM 30 MG: 30 INJECTION SUBCUTANEOUS at 04:43

## 2021-09-23 RX ADMIN — OXYCODONE 5 MG: 5 TABLET ORAL at 10:30

## 2021-09-23 RX ADMIN — OXYCODONE 5 MG: 5 TABLET ORAL at 04:42

## 2021-09-23 RX ADMIN — QUETIAPINE FUMARATE 50 MG: 100 TABLET ORAL at 04:42

## 2021-09-23 RX ADMIN — OXYCODONE 5 MG: 5 TABLET ORAL at 16:10

## 2021-09-23 RX ADMIN — QUETIAPINE FUMARATE 100 MG: 100 TABLET ORAL at 20:42

## 2021-09-23 RX ADMIN — OXYCODONE 5 MG: 5 TABLET ORAL at 00:40

## 2021-09-23 RX ADMIN — ENOXAPARIN SODIUM 30 MG: 30 INJECTION SUBCUTANEOUS at 18:59

## 2021-09-23 RX ADMIN — DOCUSATE SODIUM 50 MG AND SENNOSIDES 8.6 MG 1 TABLET: 8.6; 5 TABLET, FILM COATED ORAL at 20:42

## 2021-09-23 RX ADMIN — OXYCODONE 5 MG: 5 TABLET ORAL at 19:36

## 2021-09-23 ASSESSMENT — PAIN DESCRIPTION - PAIN TYPE
TYPE: ACUTE PAIN

## 2021-09-23 ASSESSMENT — ENCOUNTER SYMPTOMS
HEMOPTYSIS: 0
ROS GI COMMENTS: LAST BOWEL MOVEMENT 9/22
BRUISES/BLEEDS EASILY: 0
ABDOMINAL PAIN: 0
CHILLS: 0
MYALGIAS: 0
VOMITING: 0
WEAKNESS: 0
SHORTNESS OF BREATH: 0
SPUTUM PRODUCTION: 1
BLURRED VISION: 0
COUGH: 1
SHORTNESS OF BREATH: 1
NECK PAIN: 1
DIARRHEA: 1
DIZZINESS: 0
COUGH: 0
HEADACHES: 0
FEVER: 0
PALPITATIONS: 0

## 2021-09-23 NOTE — CARE PLAN
The patient is Stable - Low risk of patient condition declining or worsening    Shift Goals  Clinical Goals: pain control  Patient Goals: rest  Family Goals: N/A    Progress made toward(s) clinical / shift goals:  Pain managed with prescribed medication, pain assessments in use. Patient encouraged to call for assistance.    Problem: Knowledge Deficit - Standard  Goal: Patient and family/care givers will demonstrate understanding of plan of care, disease process/condition, diagnostic tests and medications  Outcome: Progressing     Problem: Pain - Standard  Goal: Alleviation of pain or a reduction in pain to the patient’s comfort goal  Outcome: Progressing     Patient is not progressing towards the following goals:

## 2021-09-23 NOTE — PROGRESS NOTES
"Bedside report received.  Assessment complete.  A&O x 4. Patient calls appropriately.  Patient ambulates with SBA. Telesitter at bedside for oxygen compliance/safety.  Patient has 9/10 pain. Pain managed with prescribed medications.  Denies N&V. Tolerating regular diet.  Midline incision open to air, right sided chest tube; water sealed, IR drain to right abdomen, RUDI drain to right abdomen.  + void; purewick in place for urgency,  last BM today.  Review plan with of care with patient. Call light and personal belongings within reach. Hourly rounding in place. All needs met at this time.  /71   Pulse (!) 118   Temp 37.6 °C (99.6 °F) (Temporal)   Resp 18   Ht 1.626 m (5' 4\")   Wt 59.4 kg (130 lb 13.8 oz)   SpO2 95%     "
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
## 2021-09-23 NOTE — PROGRESS NOTES
Pre chest tube clamp DX results:  Right pigtail chest tube in place.  Patchy bibasilar opacities Small bilateral pleural effusions. No pneumothorax    Clamp chest tube at 0750.   Instruct pt to call if dyspnea or chest pain while tube clamped. If pt develops dyspnea, chest pain, or O2 demands increase/ saturation decreases, immediately unclamp and replace to suction, THEN notify IR MD x [phone].    Pending Chest DX results following 1 hr trial clamping
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
## 2021-09-23 NOTE — PROGRESS NOTES
Pt denies chest pain or dyspnea. O2 sats 95% on 2 L. No radiographic evidence of Right PNTX with chest tube clamped. No air leak on exam. Chest tube removed without difficulty, occlusive dressing placed. Site care instructions reviewed. Pt understands to call bedside RN for development of chest pain or dyspnea s/p chest tube removal. Post chest tube removal x ray ordered     Vitals:    09/23/21 0813   BP:    Pulse: (!) 113   Resp: 18   Temp:    SpO2: 95%
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
## 2021-09-24 ENCOUNTER — APPOINTMENT (OUTPATIENT)
Dept: RADIOLOGY | Facility: MEDICAL CENTER | Age: 36
DRG: 957 | End: 2021-09-24
Attending: NURSE PRACTITIONER
Payer: MEDICAID

## 2021-09-24 ENCOUNTER — APPOINTMENT (OUTPATIENT)
Dept: RADIOLOGY | Facility: MEDICAL CENTER | Age: 36
DRG: 957 | End: 2021-09-24
Attending: SPECIALIST
Payer: MEDICAID

## 2021-09-24 LAB
BASOPHILS # BLD AUTO: 0.4 % (ref 0–1.8)
BASOPHILS # BLD: 0.04 K/UL (ref 0–0.12)
EOSINOPHIL # BLD AUTO: 0.36 K/UL (ref 0–0.51)
EOSINOPHIL NFR BLD: 3.6 % (ref 0–6.9)
ERYTHROCYTE [DISTWIDTH] IN BLOOD BY AUTOMATED COUNT: 57.8 FL (ref 35.9–50)
HCT VFR BLD AUTO: 35.4 % (ref 37–47)
HGB BLD-MCNC: 11.2 G/DL (ref 12–16)
IMM GRANULOCYTES # BLD AUTO: 0.04 K/UL (ref 0–0.11)
IMM GRANULOCYTES NFR BLD AUTO: 0.4 % (ref 0–0.9)
LYMPHOCYTES # BLD AUTO: 1.57 K/UL (ref 1–4.8)
LYMPHOCYTES NFR BLD: 15.7 % (ref 22–41)
MCH RBC QN AUTO: 30.5 PG (ref 27–33)
MCHC RBC AUTO-ENTMCNC: 31.6 G/DL (ref 33.6–35)
MCV RBC AUTO: 96.5 FL (ref 81.4–97.8)
MONOCYTES # BLD AUTO: 1.18 K/UL (ref 0–0.85)
MONOCYTES NFR BLD AUTO: 11.8 % (ref 0–13.4)
NEUTROPHILS # BLD AUTO: 6.82 K/UL (ref 2–7.15)
NEUTROPHILS NFR BLD: 68.1 % (ref 44–72)
NRBC # BLD AUTO: 0 K/UL
NRBC BLD-RTO: 0 /100 WBC
PLATELET # BLD AUTO: 473 K/UL (ref 164–446)
PMV BLD AUTO: 9.2 FL (ref 9–12.9)
RBC # BLD AUTO: 3.67 M/UL (ref 4.2–5.4)
WBC # BLD AUTO: 10 K/UL (ref 4.8–10.8)

## 2021-09-24 PROCEDURE — 770001 HCHG ROOM/CARE - MED/SURG/GYN PRIV*

## 2021-09-24 PROCEDURE — 71045 X-RAY EXAM CHEST 1 VIEW: CPT

## 2021-09-24 PROCEDURE — 700102 HCHG RX REV CODE 250 W/ 637 OVERRIDE(OP): Performed by: NURSE PRACTITIONER

## 2021-09-24 PROCEDURE — 94669 MECHANICAL CHEST WALL OSCILL: CPT

## 2021-09-24 PROCEDURE — A9270 NON-COVERED ITEM OR SERVICE: HCPCS | Performed by: NURSE PRACTITIONER

## 2021-09-24 PROCEDURE — 36415 COLL VENOUS BLD VENIPUNCTURE: CPT

## 2021-09-24 PROCEDURE — 700102 HCHG RX REV CODE 250 W/ 637 OVERRIDE(OP): Performed by: SURGERY

## 2021-09-24 PROCEDURE — 92526 ORAL FUNCTION THERAPY: CPT

## 2021-09-24 PROCEDURE — 99231 SBSQ HOSP IP/OBS SF/LOW 25: CPT | Mod: 24 | Performed by: NURSE PRACTITIONER

## 2021-09-24 PROCEDURE — A9270 NON-COVERED ITEM OR SERVICE: HCPCS | Performed by: SURGERY

## 2021-09-24 PROCEDURE — 74018 RADEX ABDOMEN 1 VIEW: CPT

## 2021-09-24 PROCEDURE — 700111 HCHG RX REV CODE 636 W/ 250 OVERRIDE (IP): Performed by: SURGERY

## 2021-09-24 PROCEDURE — 94760 N-INVAS EAR/PLS OXIMETRY 1: CPT

## 2021-09-24 PROCEDURE — 85025 COMPLETE CBC W/AUTO DIFF WBC: CPT

## 2021-09-24 RX ADMIN — DOCUSATE SODIUM 50 MG AND SENNOSIDES 8.6 MG 1 TABLET: 8.6; 5 TABLET, FILM COATED ORAL at 19:57

## 2021-09-24 RX ADMIN — OXYCODONE 5 MG: 5 TABLET ORAL at 01:26

## 2021-09-24 RX ADMIN — ALPRAZOLAM 0.25 MG: 0.25 TABLET ORAL at 15:20

## 2021-09-24 RX ADMIN — POLYETHYLENE GLYCOL 3350 1 PACKET: 17 POWDER, FOR SOLUTION ORAL at 19:07

## 2021-09-24 RX ADMIN — OXYCODONE 5 MG: 5 TABLET ORAL at 19:07

## 2021-09-24 RX ADMIN — OXYCODONE 5 MG: 5 TABLET ORAL at 04:47

## 2021-09-24 RX ADMIN — ENOXAPARIN SODIUM 30 MG: 30 INJECTION SUBCUTANEOUS at 04:23

## 2021-09-24 RX ADMIN — OXYCODONE 5 MG: 5 TABLET ORAL at 13:22

## 2021-09-24 RX ADMIN — QUETIAPINE FUMARATE 100 MG: 100 TABLET ORAL at 19:57

## 2021-09-24 RX ADMIN — ENOXAPARIN SODIUM 30 MG: 30 INJECTION SUBCUTANEOUS at 19:07

## 2021-09-24 RX ADMIN — DOCUSATE SODIUM 100 MG: 100 CAPSULE, LIQUID FILLED ORAL at 04:23

## 2021-09-24 RX ADMIN — OXYCODONE 5 MG: 5 TABLET ORAL at 09:22

## 2021-09-24 RX ADMIN — QUETIAPINE FUMARATE 50 MG: 100 TABLET ORAL at 04:22

## 2021-09-24 RX ADMIN — DOCUSATE SODIUM 100 MG: 100 CAPSULE, LIQUID FILLED ORAL at 19:07

## 2021-09-24 ASSESSMENT — PAIN DESCRIPTION - PAIN TYPE
TYPE: ACUTE PAIN

## 2021-09-24 ASSESSMENT — ENCOUNTER SYMPTOMS
HEMOPTYSIS: 0
MYALGIAS: 0
SHORTNESS OF BREATH: 1
FEVER: 0
PALPITATIONS: 0
DIZZINESS: 0
HEADACHES: 0
BRUISES/BLEEDS EASILY: 0
CHILLS: 0
BLURRED VISION: 0
COUGH: 1
NERVOUS/ANXIOUS: 1
ABDOMINAL PAIN: 0
WEAKNESS: 0
VOMITING: 0
SPUTUM PRODUCTION: 1
DIARRHEA: 1

## 2021-09-24 NOTE — CARE PLAN
The patient is Stable - Low risk of patient condition declining or worsening    Shift Goals  Clinical Goals: decrease anxiety, pain control  Patient Goals: pain control   Family Goals: N/A    Progress made toward(s) clinical / shift goals: Pain assessments in use, medicated for pain PRN. Patient encouraged to call for assistance. Patient resting in bed.    Problem: Knowledge Deficit - Standard  Goal: Patient and family/care givers will demonstrate understanding of plan of care, disease process/condition, diagnostic tests and medications  Outcome: Progressing     Problem: Pain - Standard  Goal: Alleviation of pain or a reduction in pain to the patient’s comfort goal  Outcome: Progressing     Patient is not progressing towards the following goals:

## 2021-09-24 NOTE — PROGRESS NOTES
Patient A+Ox4, on 4L 02, sitting up in bed with C collar on. Ate breakfast. Ambulated to BR, had loose BM. No nausea or vomiting. Chest tube in place to right lateral chest, locking loop drain and RUDI drain to RLQ. Voiding in bathroom, also has purewick and brief in place for stress incontinence. Using call bell, in room across from nurses station, hourly rounding in place

## 2021-09-24 NOTE — THERAPY
Speech Language Pathology  Daily Treatment     Patient Name: Yris Edwards  Age:  36 y.o., Sex:  female  Medical Record #: [id number]  Today's Date: 9/24/2021     Precautions  Precautions: Fall Risk, Swallow Precautions ( See Comments), Cervical Collar  , Spinal / Back Precautions   Comments: C-Collar on at all times x 6 weeks    Assessment  Patient seen this date for dysphagia tx session. Patient currently on regular diet w/ thin liquids and per RN and CNA, patient appears to be tolerating diet without difficulty. Patient agreeable to PO trials and therapy session. Patient consumed PO trials of soft solids, mixed consistencies, crackers, and thin liquids via cup sip and straw. Patient consumed all PO trials with no overt s/sx of aspiration. Patient had adequate mastication and no oral residue noted. Laryngeal elevation palpated as complete. Initiation of swallow trigger was timely.     Recommend patient continue regular diet w/ thin liquids. Straws ok. Meds ok w/ thin liquid wash. Patient does not require any further acute SLP services and will be d/c'ed 2/2 goals met. Please re-consult SLP if needed.     Plan  Discharge secondary to goals met.    Discharge Recommendations: SLP treatment completed.  Patient is not being actively followed for therapy services at this time, however may be seen if requested by attending provider for 1 more visit within 30 days to address any discharge needs or if the patient has a change in status.       Objective     09/24/21 1505   Precautions   Precautions Fall Risk;Swallow Precautions ( See Comments);Cervical Collar  ;Spinal / Back Precautions    Vitals   O2 (LPM) 2   O2 Delivery Device Silicone Nasal Cannula   Dysphagia    Positioning / Behavior Modification Self Monitoring;Modulate Rate or Bite Size   Diet / Liquid Recommendation Regular (7);Thin (0)   Nutritional Liquid Intake Rating Scale Non thickened beverages   Nutritional Food Intake Rating Scale Total oral diet with no  restrictions   Nursing Communication Swallow Precaution Sign Posted at Head of Bed   Skilled Intervention Verbal Cueing   Recommended Route of Medication Administration   Medication Administration  Whole with Liquid Wash   Short Term Goals   Short Term Goal # 1 Patient will consume regular diet w/ thin liquids with no overt s/sx of aspiration.    Goal Outcome # 1 Goal met   Anticipated Discharge Needs   Discharge Recommendations Anticipate that the patient will have no further speech therapy needs after discharge from the hospital

## 2021-09-24 NOTE — CARE PLAN
The patient is Stable - Low risk of patient condition declining or worsening    Shift Goals  Clinical Goals: pain control  Patient Goals: rest  Family Goals: N/A    Progress made toward(s) clinical / shift goals:  ambulating, moving bowels, pain controlled with oxycodone, voiding without issue     Patient is not progressing towards the following goals: continue to use IS, try to wean 02       Problem: Knowledge Deficit - Standard  Goal: Patient and family/care givers will demonstrate understanding of plan of care, disease process/condition, diagnostic tests and medications  Outcome: Progressing     Problem: Pain - Standard  Goal: Alleviation of pain or a reduction in pain to the patient’s comfort goal  Outcome: Progressing     Problem: Skin Integrity  Goal: Skin integrity is maintained or improved  Outcome: Progressing     Problem: Fall Risk  Goal: Patient will remain free from falls  Outcome: Progressing
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
## 2021-09-24 NOTE — CARE PLAN
The patient is Stable - Low risk of patient condition declining or worsening    Shift Goals  Clinical Goals: hygiene, pain control  Patient Goals: pain control, discharge  Family Goals: N/A    Progress made toward(s) clinical / shift goals:  ambulating, pain control, tolerating diet     Patient is not progressing towards the following goals: wean 02, hygiene education       Problem: Knowledge Deficit - Standard  Goal: Patient and family/care givers will demonstrate understanding of plan of care, disease process/condition, diagnostic tests and medications  Outcome: Progressing     Problem: Pain - Standard  Goal: Alleviation of pain or a reduction in pain to the patient’s comfort goal  Outcome: Progressing     Problem: Skin Integrity  Goal: Skin integrity is maintained or improved  Outcome: Progressing     Problem: Fall Risk  Goal: Patient will remain free from falls  Outcome: Progressing

## 2021-09-24 NOTE — PROGRESS NOTES
"Bedside report received.  Assessment complete.  A&O x 4. Patient calls appropriately.  Patient ambulates with SBA. Telesitter at bedside for oxygen compliance/safety.  Patient has 9/10 pain. Pain managed with prescribed medications.  Denies N&V. Tolerating regular diet.  Midline incision open to air, RUDI drain to right abdomen, x2 dressings to old chest tube site/IR drain site.  + void; purewick in place for urgency, last BM today.  Review plan with of care with patient. Call light and personal belongings within reach. Hourly rounding in place. All needs met at this time.  /78   Pulse (!) 119   Temp 37.7 °C (99.9 °F) (Temporal)   Resp 16   Ht 1.626 m (5' 4\")   Wt 59.4 kg (130 lb 13.8 oz)   SpO2 95%     "

## 2021-09-24 NOTE — PROGRESS NOTES
"Patient on 3L 02, using IS but needs coaching regarding technique. I asked patient to cough to clear her lungs, she replied \"but I don't need to cough, and it hurts.\" Medicated for pain per EMAR. Ambulated to BR, had large loose brown BM. Educated on enedelia care, during hygiene after toileting RN found moderate amounts of stool between labia. Patient states she is eager to go home with family. C Collar in place, VSS. Uses call bell approp, in room across from nurses station   "

## 2021-09-25 ENCOUNTER — APPOINTMENT (OUTPATIENT)
Dept: RADIOLOGY | Facility: MEDICAL CENTER | Age: 36
DRG: 957 | End: 2021-09-25
Attending: SPECIALIST
Payer: MEDICAID

## 2021-09-25 PROBLEM — R18.8 INTRAABDOMINAL FLUID COLLECTION: Status: RESOLVED | Noted: 2021-09-21 | Resolved: 2021-09-25

## 2021-09-25 LAB
BASOPHILS # BLD AUTO: 0.3 % (ref 0–1.8)
BASOPHILS # BLD: 0.03 K/UL (ref 0–0.12)
EOSINOPHIL # BLD AUTO: 0.34 K/UL (ref 0–0.51)
EOSINOPHIL NFR BLD: 3.7 % (ref 0–6.9)
ERYTHROCYTE [DISTWIDTH] IN BLOOD BY AUTOMATED COUNT: 57.1 FL (ref 35.9–50)
HCT VFR BLD AUTO: 34.5 % (ref 37–47)
HGB BLD-MCNC: 11.4 G/DL (ref 12–16)
IMM GRANULOCYTES # BLD AUTO: 0.06 K/UL (ref 0–0.11)
IMM GRANULOCYTES NFR BLD AUTO: 0.7 % (ref 0–0.9)
LYMPHOCYTES # BLD AUTO: 1.2 K/UL (ref 1–4.8)
LYMPHOCYTES NFR BLD: 13 % (ref 22–41)
MCH RBC QN AUTO: 31.4 PG (ref 27–33)
MCHC RBC AUTO-ENTMCNC: 33 G/DL (ref 33.6–35)
MCV RBC AUTO: 95 FL (ref 81.4–97.8)
MONOCYTES # BLD AUTO: 0.73 K/UL (ref 0–0.85)
MONOCYTES NFR BLD AUTO: 7.9 % (ref 0–13.4)
NEUTROPHILS # BLD AUTO: 6.87 K/UL (ref 2–7.15)
NEUTROPHILS NFR BLD: 74.4 % (ref 44–72)
NRBC # BLD AUTO: 0 K/UL
NRBC BLD-RTO: 0 /100 WBC
PLATELET # BLD AUTO: 429 K/UL (ref 164–446)
PMV BLD AUTO: 9.4 FL (ref 9–12.9)
RBC # BLD AUTO: 3.63 M/UL (ref 4.2–5.4)
WBC # BLD AUTO: 9.2 K/UL (ref 4.8–10.8)

## 2021-09-25 PROCEDURE — 99232 SBSQ HOSP IP/OBS MODERATE 35: CPT | Performed by: NURSE PRACTITIONER

## 2021-09-25 PROCEDURE — A9270 NON-COVERED ITEM OR SERVICE: HCPCS | Performed by: SURGERY

## 2021-09-25 PROCEDURE — 700102 HCHG RX REV CODE 250 W/ 637 OVERRIDE(OP): Performed by: NURSE PRACTITIONER

## 2021-09-25 PROCEDURE — 770001 HCHG ROOM/CARE - MED/SURG/GYN PRIV*

## 2021-09-25 PROCEDURE — A9270 NON-COVERED ITEM OR SERVICE: HCPCS | Performed by: NURSE PRACTITIONER

## 2021-09-25 PROCEDURE — 94760 N-INVAS EAR/PLS OXIMETRY 1: CPT

## 2021-09-25 PROCEDURE — 700111 HCHG RX REV CODE 636 W/ 250 OVERRIDE (IP): Performed by: SURGERY

## 2021-09-25 PROCEDURE — 36415 COLL VENOUS BLD VENIPUNCTURE: CPT

## 2021-09-25 PROCEDURE — 71045 X-RAY EXAM CHEST 1 VIEW: CPT

## 2021-09-25 PROCEDURE — 700102 HCHG RX REV CODE 250 W/ 637 OVERRIDE(OP): Performed by: SURGERY

## 2021-09-25 PROCEDURE — 85025 COMPLETE CBC W/AUTO DIFF WBC: CPT

## 2021-09-25 RX ORDER — ACETAMINOPHEN 325 MG/1
650 TABLET ORAL EVERY 4 HOURS PRN
Status: DISCONTINUED | OUTPATIENT
Start: 2021-09-25 | End: 2021-10-04 | Stop reason: HOSPADM

## 2021-09-25 RX ORDER — ACETAMINOPHEN 650 MG/1
650 SUPPOSITORY RECTAL EVERY 4 HOURS PRN
Status: DISCONTINUED | OUTPATIENT
Start: 2021-09-25 | End: 2021-10-04 | Stop reason: HOSPADM

## 2021-09-25 RX ORDER — OXYCODONE HYDROCHLORIDE 5 MG/1
5 TABLET ORAL EVERY 6 HOURS PRN
Status: DISCONTINUED | OUTPATIENT
Start: 2021-09-25 | End: 2021-10-04 | Stop reason: HOSPADM

## 2021-09-25 RX ORDER — FUROSEMIDE 20 MG/1
20 TABLET ORAL
Status: COMPLETED | OUTPATIENT
Start: 2021-09-25 | End: 2021-09-27

## 2021-09-25 RX ADMIN — OXYCODONE 5 MG: 5 TABLET ORAL at 18:42

## 2021-09-25 RX ADMIN — POLYETHYLENE GLYCOL 3350 1 PACKET: 17 POWDER, FOR SOLUTION ORAL at 18:42

## 2021-09-25 RX ADMIN — OXYCODONE 5 MG: 5 TABLET ORAL at 01:51

## 2021-09-25 RX ADMIN — ENOXAPARIN SODIUM 30 MG: 30 INJECTION SUBCUTANEOUS at 18:42

## 2021-09-25 RX ADMIN — DOCUSATE SODIUM 50 MG AND SENNOSIDES 8.6 MG 1 TABLET: 8.6; 5 TABLET, FILM COATED ORAL at 20:44

## 2021-09-25 RX ADMIN — DOCUSATE SODIUM 100 MG: 100 CAPSULE, LIQUID FILLED ORAL at 18:42

## 2021-09-25 RX ADMIN — QUETIAPINE FUMARATE 100 MG: 100 TABLET ORAL at 20:44

## 2021-09-25 RX ADMIN — POLYETHYLENE GLYCOL 3350 1 PACKET: 17 POWDER, FOR SOLUTION ORAL at 04:56

## 2021-09-25 RX ADMIN — OXYCODONE 5 MG: 5 TABLET ORAL at 12:02

## 2021-09-25 RX ADMIN — OXYCODONE 5 MG: 5 TABLET ORAL at 04:56

## 2021-09-25 RX ADMIN — ENOXAPARIN SODIUM 30 MG: 30 INJECTION SUBCUTANEOUS at 04:56

## 2021-09-25 RX ADMIN — DOCUSATE SODIUM 100 MG: 100 CAPSULE, LIQUID FILLED ORAL at 04:57

## 2021-09-25 RX ADMIN — FUROSEMIDE 20 MG: 20 TABLET ORAL at 10:01

## 2021-09-25 RX ADMIN — QUETIAPINE FUMARATE 50 MG: 100 TABLET ORAL at 04:56

## 2021-09-25 RX ADMIN — ALPRAZOLAM 0.25 MG: 0.25 TABLET ORAL at 20:44

## 2021-09-25 RX ADMIN — ALPRAZOLAM 0.25 MG: 0.25 TABLET ORAL at 10:01

## 2021-09-25 ASSESSMENT — PAIN DESCRIPTION - PAIN TYPE
TYPE: ACUTE PAIN

## 2021-09-25 ASSESSMENT — ENCOUNTER SYMPTOMS
HEADACHES: 0
FEVER: 0
DIZZINESS: 0
FOCAL WEAKNESS: 0
NECK PAIN: 0
SPEECH CHANGE: 0
SHORTNESS OF BREATH: 0
TINGLING: 0
CHILLS: 0
BACK PAIN: 0
CONSTIPATION: 0
SENSORY CHANGE: 0
ABDOMINAL PAIN: 0
NAUSEA: 0
VOMITING: 0
DOUBLE VISION: 0
BLURRED VISION: 0
MYALGIAS: 0

## 2021-09-25 NOTE — PROGRESS NOTES
"Bedside report received.  Assessment complete.  A&O x 4. Patient calls appropriately.  Patient ambulates with SBA. Telesitter at bedside for oxygen compliance/safety.  Patient has 8/10 pain. Pain managed with prescribed medications.  Denies N&V. Tolerating regular diet.  Midline incision open to air, x2 dressings to abdomen, x1 dressing to right chest (old chest tube site).  + void; purewick in place for urgency, last BM today.  Review plan with of care with patient. Call light and personal belongings within reach. Hourly rounding in place. All needs met at this time.  /66   Pulse (!) 129   Temp 36.9 °C (98.5 °F) (Temporal)   Resp 14   Ht 1.626 m (5' 4\")   Wt 59.4 kg (130 lb 13.8 oz)   SpO2 92%     "

## 2021-09-25 NOTE — CARE PLAN
The patient is Watcher - Medium risk of patient condition declining or worsening    Shift Goals  Clinical Goals: deep breathing, using IS, coughing  Patient Goals: discharge   Family Goals: N/A    Progress made toward(s) clinical / shift goals:  ambulating to BR, using call bell, tolerating diet, pain controlled with oxycodone    Patient is not progressing towards the following goals: continues with incontinence, anxious behavior, refusing coughing and deep breathing, refusing teaching for IS    Problem: Knowledge Deficit - Standard  Goal: Patient and family/care givers will demonstrate understanding of plan of care, disease process/condition, diagnostic tests and medications  Outcome: Not Met     Problem: Psychosocial  Goal: Patient's level of anxiety will decrease  Outcome: Not Met  Goal: Patient's ability to verbalize feelings about condition will improve  Outcome: Not Met  Goal: Patient's ability to re-evaluate and adapt role responsibilities will improve  Outcome: Not Met  Goal: Patient and family will demonstrate ability to cope with life altering diagnosis and/or procedure  Outcome: Not Met  Goal: Spiritual and cultural needs incorporated into hospitalization  Outcome: Not Met     Problem: Respiratory  Goal: Patient will achieve/maintain optimum respiratory ventilation and gas exchange  Outcome: Not Met

## 2021-09-25 NOTE — PROGRESS NOTES
Patient ambulated in hallway for 02 eval, she walked very quickly in hallway and became frustrated when nursing asked her to slow down and catch her breath. Encouraged patient to take deep, slow breaths, she continues with quick respirations and anxious behavior. Educated on importance of pulmonary toileting.

## 2021-09-25 NOTE — CARE PLAN
The patient is Stable - Low risk of patient condition declining or worsening    Shift Goals  Clinical Goals: rest, mobilization  Patient Goals: shower, pain control, rest  Family Goals: N/A    Progress made toward(s) clinical / shift goals: Pain assessments in use, patient medicated for pain with prescribed medications PRN. Patient encouraged to call for assistance.    Problem: Knowledge Deficit - Standard  Goal: Patient and family/care givers will demonstrate understanding of plan of care, disease process/condition, diagnostic tests and medications  Outcome: Progressing    Problem: Pain - Standard  Goal: Alleviation of pain or a reduction in pain to the patient’s comfort goal  Outcome: Progressing     Patient is not progressing towards the following goals:
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
## 2021-09-25 NOTE — PROGRESS NOTES
Patient A+Ox4, on 4L 02, extensive bedside teaching with IS and coughing, patient becomes frustrated and angry and refuses to participate. Incontinent of urine in bed, hygiene teaching completed, patient given barrier wipes so she can clean herself in between toileting. BM in toilet, loose and large. Given xanax for anxiety. Using call bell approp, bed locked, tele sitter in room, patient updated on plan of care

## 2021-09-26 ENCOUNTER — APPOINTMENT (OUTPATIENT)
Dept: RADIOLOGY | Facility: MEDICAL CENTER | Age: 36
DRG: 957 | End: 2021-09-26
Attending: NURSE PRACTITIONER
Payer: MEDICAID

## 2021-09-26 ENCOUNTER — APPOINTMENT (OUTPATIENT)
Dept: CARDIOLOGY | Facility: MEDICAL CENTER | Age: 36
DRG: 957 | End: 2021-09-26
Attending: NURSE PRACTITIONER
Payer: MEDICAID

## 2021-09-26 LAB
ANION GAP SERPL CALC-SCNC: 10 MMOL/L (ref 7–16)
BASOPHILS # BLD AUTO: 0.6 % (ref 0–1.8)
BASOPHILS # BLD: 0.04 K/UL (ref 0–0.12)
BUN SERPL-MCNC: 7 MG/DL (ref 8–22)
CALCIUM SERPL-MCNC: 9.1 MG/DL (ref 8.5–10.5)
CHLORIDE SERPL-SCNC: 98 MMOL/L (ref 96–112)
CO2 SERPL-SCNC: 26 MMOL/L (ref 20–33)
CREAT SERPL-MCNC: 0.35 MG/DL (ref 0.5–1.4)
EOSINOPHIL # BLD AUTO: 0.43 K/UL (ref 0–0.51)
EOSINOPHIL NFR BLD: 6 % (ref 0–6.9)
ERYTHROCYTE [DISTWIDTH] IN BLOOD BY AUTOMATED COUNT: 55.1 FL (ref 35.9–50)
GLUCOSE SERPL-MCNC: 113 MG/DL (ref 65–99)
HCT VFR BLD AUTO: 32.4 % (ref 37–47)
HGB BLD-MCNC: 10.5 G/DL (ref 12–16)
IMM GRANULOCYTES # BLD AUTO: 0.03 K/UL (ref 0–0.11)
IMM GRANULOCYTES NFR BLD AUTO: 0.4 % (ref 0–0.9)
LV EJECT FRACT  99904: 60
LV EJECT FRACT MOD 2C 99903: 65.68
LV EJECT FRACT MOD 4C 99902: 68.45
LV EJECT FRACT MOD BP 99901: 65.31
LYMPHOCYTES # BLD AUTO: 1.11 K/UL (ref 1–4.8)
LYMPHOCYTES NFR BLD: 15.5 % (ref 22–41)
MCH RBC QN AUTO: 30.2 PG (ref 27–33)
MCHC RBC AUTO-ENTMCNC: 32.4 G/DL (ref 33.6–35)
MCV RBC AUTO: 93.1 FL (ref 81.4–97.8)
MONOCYTES # BLD AUTO: 0.83 K/UL (ref 0–0.85)
MONOCYTES NFR BLD AUTO: 11.6 % (ref 0–13.4)
NEUTROPHILS # BLD AUTO: 4.71 K/UL (ref 2–7.15)
NEUTROPHILS NFR BLD: 65.9 % (ref 44–72)
NRBC # BLD AUTO: 0 K/UL
NRBC BLD-RTO: 0 /100 WBC
PLATELET # BLD AUTO: 379 K/UL (ref 164–446)
PMV BLD AUTO: 9.3 FL (ref 9–12.9)
POTASSIUM SERPL-SCNC: 3.9 MMOL/L (ref 3.6–5.5)
RBC # BLD AUTO: 3.48 M/UL (ref 4.2–5.4)
SODIUM SERPL-SCNC: 134 MMOL/L (ref 135–145)
WBC # BLD AUTO: 7.2 K/UL (ref 4.8–10.8)

## 2021-09-26 PROCEDURE — 85025 COMPLETE CBC W/AUTO DIFF WBC: CPT

## 2021-09-26 PROCEDURE — 93306 TTE W/DOPPLER COMPLETE: CPT | Mod: 26 | Performed by: INTERNAL MEDICINE

## 2021-09-26 PROCEDURE — 700102 HCHG RX REV CODE 250 W/ 637 OVERRIDE(OP): Performed by: NURSE PRACTITIONER

## 2021-09-26 PROCEDURE — 71046 X-RAY EXAM CHEST 2 VIEWS: CPT

## 2021-09-26 PROCEDURE — A9270 NON-COVERED ITEM OR SERVICE: HCPCS | Performed by: NURSE PRACTITIONER

## 2021-09-26 PROCEDURE — A9270 NON-COVERED ITEM OR SERVICE: HCPCS | Performed by: SURGERY

## 2021-09-26 PROCEDURE — 74018 RADEX ABDOMEN 1 VIEW: CPT

## 2021-09-26 PROCEDURE — 94669 MECHANICAL CHEST WALL OSCILL: CPT

## 2021-09-26 PROCEDURE — 36415 COLL VENOUS BLD VENIPUNCTURE: CPT

## 2021-09-26 PROCEDURE — 93306 TTE W/DOPPLER COMPLETE: CPT

## 2021-09-26 PROCEDURE — 99232 SBSQ HOSP IP/OBS MODERATE 35: CPT | Performed by: SURGERY

## 2021-09-26 PROCEDURE — 770001 HCHG ROOM/CARE - MED/SURG/GYN PRIV*

## 2021-09-26 PROCEDURE — 700102 HCHG RX REV CODE 250 W/ 637 OVERRIDE(OP): Performed by: SURGERY

## 2021-09-26 PROCEDURE — 80048 BASIC METABOLIC PNL TOTAL CA: CPT

## 2021-09-26 PROCEDURE — 700111 HCHG RX REV CODE 636 W/ 250 OVERRIDE (IP): Performed by: SURGERY

## 2021-09-26 RX ADMIN — POLYETHYLENE GLYCOL 3350 1 PACKET: 17 POWDER, FOR SOLUTION ORAL at 17:28

## 2021-09-26 RX ADMIN — ENOXAPARIN SODIUM 30 MG: 30 INJECTION SUBCUTANEOUS at 17:28

## 2021-09-26 RX ADMIN — FUROSEMIDE 20 MG: 20 TABLET ORAL at 05:08

## 2021-09-26 RX ADMIN — OXYCODONE 5 MG: 5 TABLET ORAL at 12:11

## 2021-09-26 RX ADMIN — POLYETHYLENE GLYCOL 3350 1 PACKET: 17 POWDER, FOR SOLUTION ORAL at 05:09

## 2021-09-26 RX ADMIN — DOCUSATE SODIUM 100 MG: 100 CAPSULE, LIQUID FILLED ORAL at 17:28

## 2021-09-26 RX ADMIN — DOCUSATE SODIUM 50 MG AND SENNOSIDES 8.6 MG 1 TABLET: 8.6; 5 TABLET, FILM COATED ORAL at 20:23

## 2021-09-26 RX ADMIN — DOCUSATE SODIUM 100 MG: 100 CAPSULE, LIQUID FILLED ORAL at 05:09

## 2021-09-26 RX ADMIN — OXYCODONE 5 MG: 5 TABLET ORAL at 05:08

## 2021-09-26 RX ADMIN — ENOXAPARIN SODIUM 30 MG: 30 INJECTION SUBCUTANEOUS at 05:08

## 2021-09-26 RX ADMIN — QUETIAPINE FUMARATE 50 MG: 100 TABLET ORAL at 05:08

## 2021-09-26 RX ADMIN — MAGNESIUM HYDROXIDE 30 ML: 400 SUSPENSION ORAL at 05:09

## 2021-09-26 RX ADMIN — QUETIAPINE FUMARATE 100 MG: 100 TABLET ORAL at 20:22

## 2021-09-26 RX ADMIN — OXYCODONE 5 MG: 5 TABLET ORAL at 18:49

## 2021-09-26 ASSESSMENT — ENCOUNTER SYMPTOMS
VOMITING: 0
NECK PAIN: 0
CHILLS: 0
DIZZINESS: 0
COUGH: 1
NAUSEA: 0
SENSORY CHANGE: 0
MYALGIAS: 0
CONSTIPATION: 0
FOCAL WEAKNESS: 0
BLURRED VISION: 0
TINGLING: 0
BACK PAIN: 0
SPUTUM PRODUCTION: 1
SPEECH CHANGE: 0
HEADACHES: 0
SHORTNESS OF BREATH: 0
DOUBLE VISION: 0
FEVER: 0
ABDOMINAL PAIN: 0

## 2021-09-26 ASSESSMENT — PAIN DESCRIPTION - PAIN TYPE
TYPE: ACUTE PAIN
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
## 2021-09-26 NOTE — PROGRESS NOTES
Received report from previous shift RN  Assessment complete.  A&O x 4. Patient calls appropriately.  Patient ambulates with SBA. C-Collar in place. Tele sitter in place.   Patient has 7/10 pain. Pain managed with prescribed medications.  Denies N&V. Tolerating regular diet.  Surgical incisions with dressing in place x2, CDI  Midline incision, RENEE.  + void, + flatus, last BM 9/25.  Patient denies SOB.  SCD's refused.    Patient sitting in bed. Review plan with of care with patient. Call light and personal belongings with in reach. Hourly rounding in place. All needs met at this time.

## 2021-09-26 NOTE — CARE PLAN
Problem: Knowledge Deficit - Standard  Goal: Patient and family/care givers will demonstrate understanding of plan of care, disease process/condition, diagnostic tests and medications  Outcome: Progressing     Problem: Pain - Standard  Goal: Alleviation of pain or a reduction in pain to the patient’s comfort goal  Outcome: Progressing     The patient is Stable - Low risk of patient condition declining or worsening    Shift Goals  Clinical Goals: pulmonary hygiene   Patient Goals: rest, pain control   Family Goals: N/A    Progress made toward(s) clinical / shift goals:  IS use encouraged and oral care education provided.     Patient is not progressing towards the following goals:

## 2021-09-27 ENCOUNTER — APPOINTMENT (OUTPATIENT)
Dept: RADIOLOGY | Facility: MEDICAL CENTER | Age: 36
DRG: 957 | End: 2021-09-27
Attending: NURSE PRACTITIONER
Payer: MEDICAID

## 2021-09-27 LAB
ANION GAP SERPL CALC-SCNC: 8 MMOL/L (ref 7–16)
BASOPHILS # BLD AUTO: 0.6 % (ref 0–1.8)
BASOPHILS # BLD: 0.03 K/UL (ref 0–0.12)
BUN SERPL-MCNC: 7 MG/DL (ref 8–22)
CALCIUM SERPL-MCNC: 9.4 MG/DL (ref 8.5–10.5)
CHLORIDE SERPL-SCNC: 101 MMOL/L (ref 96–112)
CO2 SERPL-SCNC: 29 MMOL/L (ref 20–33)
CREAT SERPL-MCNC: 0.38 MG/DL (ref 0.5–1.4)
EOSINOPHIL # BLD AUTO: 0.49 K/UL (ref 0–0.51)
EOSINOPHIL NFR BLD: 9.1 % (ref 0–6.9)
ERYTHROCYTE [DISTWIDTH] IN BLOOD BY AUTOMATED COUNT: 55.2 FL (ref 35.9–50)
GLUCOSE SERPL-MCNC: 98 MG/DL (ref 65–99)
HCT VFR BLD AUTO: 30.7 % (ref 37–47)
HGB BLD-MCNC: 9.9 G/DL (ref 12–16)
IMM GRANULOCYTES # BLD AUTO: 0.02 K/UL (ref 0–0.11)
IMM GRANULOCYTES NFR BLD AUTO: 0.4 % (ref 0–0.9)
LYMPHOCYTES # BLD AUTO: 1.08 K/UL (ref 1–4.8)
LYMPHOCYTES NFR BLD: 20 % (ref 22–41)
MCH RBC QN AUTO: 30.3 PG (ref 27–33)
MCHC RBC AUTO-ENTMCNC: 32.2 G/DL (ref 33.6–35)
MCV RBC AUTO: 93.9 FL (ref 81.4–97.8)
MONOCYTES # BLD AUTO: 0.8 K/UL (ref 0–0.85)
MONOCYTES NFR BLD AUTO: 14.8 % (ref 0–13.4)
NEUTROPHILS # BLD AUTO: 2.98 K/UL (ref 2–7.15)
NEUTROPHILS NFR BLD: 55.1 % (ref 44–72)
NRBC # BLD AUTO: 0 K/UL
NRBC BLD-RTO: 0 /100 WBC
PLATELET # BLD AUTO: 365 K/UL (ref 164–446)
PMV BLD AUTO: 9.2 FL (ref 9–12.9)
POTASSIUM SERPL-SCNC: 3.9 MMOL/L (ref 3.6–5.5)
RBC # BLD AUTO: 3.27 M/UL (ref 4.2–5.4)
SODIUM SERPL-SCNC: 138 MMOL/L (ref 135–145)
WBC # BLD AUTO: 5.4 K/UL (ref 4.8–10.8)

## 2021-09-27 PROCEDURE — 36415 COLL VENOUS BLD VENIPUNCTURE: CPT

## 2021-09-27 PROCEDURE — A9270 NON-COVERED ITEM OR SERVICE: HCPCS | Performed by: NURSE PRACTITIONER

## 2021-09-27 PROCEDURE — A9270 NON-COVERED ITEM OR SERVICE: HCPCS | Performed by: SURGERY

## 2021-09-27 PROCEDURE — 71045 X-RAY EXAM CHEST 1 VIEW: CPT

## 2021-09-27 PROCEDURE — 700102 HCHG RX REV CODE 250 W/ 637 OVERRIDE(OP): Performed by: SURGERY

## 2021-09-27 PROCEDURE — 99231 SBSQ HOSP IP/OBS SF/LOW 25: CPT | Performed by: NURSE PRACTITIONER

## 2021-09-27 PROCEDURE — 770001 HCHG ROOM/CARE - MED/SURG/GYN PRIV*

## 2021-09-27 PROCEDURE — 700111 HCHG RX REV CODE 636 W/ 250 OVERRIDE (IP): Performed by: SURGERY

## 2021-09-27 PROCEDURE — 700102 HCHG RX REV CODE 250 W/ 637 OVERRIDE(OP): Performed by: NURSE PRACTITIONER

## 2021-09-27 PROCEDURE — 74250 X-RAY XM SM INT 1CNTRST STD: CPT

## 2021-09-27 PROCEDURE — 700117 HCHG RX CONTRAST REV CODE 255: Performed by: NURSE PRACTITIONER

## 2021-09-27 PROCEDURE — 85025 COMPLETE CBC W/AUTO DIFF WBC: CPT

## 2021-09-27 PROCEDURE — 80048 BASIC METABOLIC PNL TOTAL CA: CPT

## 2021-09-27 RX ORDER — GUAIFENESIN 600 MG/1
600 TABLET, EXTENDED RELEASE ORAL EVERY 12 HOURS
Status: DISCONTINUED | OUTPATIENT
Start: 2021-09-27 | End: 2021-10-04 | Stop reason: HOSPADM

## 2021-09-27 RX ADMIN — OXYCODONE 5 MG: 5 TABLET ORAL at 20:13

## 2021-09-27 RX ADMIN — IOHEXOL 200 ML: 350 INJECTION, SOLUTION INTRAVENOUS at 12:34

## 2021-09-27 RX ADMIN — ALPRAZOLAM 0.25 MG: 0.25 TABLET ORAL at 13:59

## 2021-09-27 RX ADMIN — OXYCODONE 5 MG: 5 TABLET ORAL at 04:51

## 2021-09-27 RX ADMIN — POLYETHYLENE GLYCOL 3350 1 PACKET: 17 POWDER, FOR SOLUTION ORAL at 20:14

## 2021-09-27 RX ADMIN — DOCUSATE SODIUM 100 MG: 100 CAPSULE, LIQUID FILLED ORAL at 04:50

## 2021-09-27 RX ADMIN — ACETAMINOPHEN 650 MG: 325 TABLET, FILM COATED ORAL at 20:13

## 2021-09-27 RX ADMIN — FUROSEMIDE 20 MG: 20 TABLET ORAL at 04:51

## 2021-09-27 RX ADMIN — ENOXAPARIN SODIUM 30 MG: 30 INJECTION SUBCUTANEOUS at 20:14

## 2021-09-27 RX ADMIN — DOCUSATE SODIUM 50 MG AND SENNOSIDES 8.6 MG 1 TABLET: 8.6; 5 TABLET, FILM COATED ORAL at 20:13

## 2021-09-27 RX ADMIN — GUAIFENESIN 600 MG: 600 TABLET, EXTENDED RELEASE ORAL at 20:13

## 2021-09-27 RX ADMIN — MAGNESIUM HYDROXIDE 30 ML: 400 SUSPENSION ORAL at 04:51

## 2021-09-27 RX ADMIN — ENOXAPARIN SODIUM 30 MG: 30 INJECTION SUBCUTANEOUS at 04:51

## 2021-09-27 RX ADMIN — QUETIAPINE FUMARATE 100 MG: 100 TABLET ORAL at 20:13

## 2021-09-27 RX ADMIN — OXYCODONE 5 MG: 5 TABLET ORAL at 11:41

## 2021-09-27 RX ADMIN — QUETIAPINE FUMARATE 50 MG: 100 TABLET ORAL at 04:50

## 2021-09-27 RX ADMIN — DOCUSATE SODIUM 100 MG: 100 CAPSULE, LIQUID FILLED ORAL at 20:14

## 2021-09-27 ASSESSMENT — ENCOUNTER SYMPTOMS
SHORTNESS OF BREATH: 0
NECK PAIN: 0
MYALGIAS: 0
SENSORY CHANGE: 0
FOCAL WEAKNESS: 0
COUGH: 1
SPUTUM PRODUCTION: 1
CHILLS: 0
DOUBLE VISION: 0
NAUSEA: 0
ABDOMINAL PAIN: 0
TINGLING: 0
BLURRED VISION: 0
HEADACHES: 0
DIZZINESS: 0
BACK PAIN: 0
FEVER: 0
VOMITING: 0
CONSTIPATION: 0
SPEECH CHANGE: 0

## 2021-09-27 ASSESSMENT — PAIN DESCRIPTION - PAIN TYPE
TYPE: ACUTE PAIN

## 2021-09-27 NOTE — PROGRESS NOTES
Patient given accapella today as she isn't motivated and not good at incentive spirometer.  Patient been active at using it.

## 2021-09-27 NOTE — CARE PLAN
Shift Goals  Clinical Goals: pulomnary hygiene   Patient Goals: rest; have a BM  Family Goals: N/A    Progress made toward(s) clinical / shift goals:    Problem: Pain - Standard  Goal: Alleviation of pain or a reduction in pain to the patient’s comfort goal  Outcome: Progressing  Note: Pt required 1 x dose of PRN Oxycodone for pain of 7/10 located in her ribs and neck.      Problem: Fall Risk  Goal: Patient will remain free from falls  Outcome: Progressing  Note: Pt has not utilized the call light overnight, but will ask to use the restroom or ambulate around the room if staff members are close by.      Problem: Respiratory  Goal: Patient will achieve/maintain optimum respiratory ventilation and gas exchange  Outcome: Progressing  Note: Pt remains on 4LNC overnight as an attempt to wean to 3LNC resulted in O2 around 85%. With 4LNC, pt is able to sustain O2 of 90% and above.     Patient is not progressing towards the following goals:  Problem: Bowel Elimination  Goal: Establish and maintain regular bowel function  Outcome: Not Met  Note: Pt has been unsuccessful with passing another BM this evening. Pt will be scheduled for a Small Bowel Series X-Ray. Pt has been NPO besides sips with meds since 0000.      The patient is Watcher - Medium risk of patient condition declining or worsening.
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
## 2021-09-27 NOTE — CARE PLAN
The patient is Stable - Low risk of patient condition declining or worsening    Shift Goals  Clinical Goals: IS, coughing, pulmonary hygiene  Patient Goals: discharge, rest, pain medication   Family Goals: N/A    Progress made toward(s) clinical / shift goals:  walking to BR, calls approp for needs, good appetite, voiding, having BMs, pain well controlled     Patient is not progressing towards the following goals: continues with incontinence, has not passed foreign body yet, noncompliant with pulmonary care, still on 02     Problem: Respiratory  Goal: Patient will achieve/maintain optimum respiratory ventilation and gas exchange  Outcome: Not Met      Problem: Knowledge Deficit - Standard  Goal: Patient and family/care givers will demonstrate understanding of plan of care, disease process/condition, diagnostic tests and medications  Outcome: Progressing     Problem: Pain - Standard  Goal: Alleviation of pain or a reduction in pain to the patient’s comfort goal  Outcome: Progressing     Problem: Skin Integrity  Goal: Skin integrity is maintained or improved  Outcome: Progressing     Problem: Respiratory  Goal: Patient will achieve/maintain optimum respiratory ventilation and gas exchange  Outcome: Not Met

## 2021-09-27 NOTE — CARE PLAN
Problem: Knowledge Deficit - Standard  Goal: Patient and family/care givers will demonstrate understanding of plan of care, disease process/condition, diagnostic tests and medications  Outcome: Progressing     Problem: Pain - Standard  Goal: Alleviation of pain or a reduction in pain to the patient’s comfort goal  Outcome: Progressing     Problem: Skin Integrity  Goal: Skin integrity is maintained or improved  Outcome: Progressing     Problem: Fall Risk  Goal: Patient will remain free from falls  Outcome: Progressing     Problem: Psychosocial  Goal: Patient's level of anxiety will decrease  Outcome: Progressing  Goal: Patient's ability to verbalize feelings about condition will improve  Outcome: Progressing   The patient is Stable - Low risk of patient condition declining or worsening    Shift Goals  Clinical Goals: pulmunary hygeiene  Patient Goals: rest   Family Goals: N/A    Progress made toward(s) clinical / shift goals:  patient working on flutter valve      Patient is not progressing towards the following goals:

## 2021-09-27 NOTE — PROGRESS NOTES
Patient A+Ox4, on 4L 02 satting 93%, no distress at this time. C Collar in place. NPO for small bowel series. +BS, had BM on toilet this morning, denies passing any foreign bodies. Voiding, incontinent of urine x1, patient educated on enedelia care. Using call bell approp, bed locked and in lowest position, tele sitter in room, patient in room across from nurses station

## 2021-09-28 ENCOUNTER — APPOINTMENT (OUTPATIENT)
Dept: RADIOLOGY | Facility: MEDICAL CENTER | Age: 36
DRG: 957 | End: 2021-09-28
Attending: NURSE PRACTITIONER
Payer: MEDICAID

## 2021-09-28 PROBLEM — T18.4XXA FOREIGN BODY IN COLON: Status: ACTIVE | Noted: 2021-09-28

## 2021-09-28 PROCEDURE — 74018 RADEX ABDOMEN 1 VIEW: CPT

## 2021-09-28 PROCEDURE — A9270 NON-COVERED ITEM OR SERVICE: HCPCS | Performed by: NURSE PRACTITIONER

## 2021-09-28 PROCEDURE — 770001 HCHG ROOM/CARE - MED/SURG/GYN PRIV*

## 2021-09-28 PROCEDURE — 700102 HCHG RX REV CODE 250 W/ 637 OVERRIDE(OP): Performed by: NURSE PRACTITIONER

## 2021-09-28 PROCEDURE — 700102 HCHG RX REV CODE 250 W/ 637 OVERRIDE(OP): Performed by: STUDENT IN AN ORGANIZED HEALTH CARE EDUCATION/TRAINING PROGRAM

## 2021-09-28 PROCEDURE — 94760 N-INVAS EAR/PLS OXIMETRY 1: CPT

## 2021-09-28 PROCEDURE — A9270 NON-COVERED ITEM OR SERVICE: HCPCS | Performed by: SURGERY

## 2021-09-28 PROCEDURE — 700102 HCHG RX REV CODE 250 W/ 637 OVERRIDE(OP): Performed by: SURGERY

## 2021-09-28 PROCEDURE — 94669 MECHANICAL CHEST WALL OSCILL: CPT

## 2021-09-28 PROCEDURE — 700111 HCHG RX REV CODE 636 W/ 250 OVERRIDE (IP): Performed by: SURGERY

## 2021-09-28 PROCEDURE — A9270 NON-COVERED ITEM OR SERVICE: HCPCS | Performed by: STUDENT IN AN ORGANIZED HEALTH CARE EDUCATION/TRAINING PROGRAM

## 2021-09-28 PROCEDURE — 99231 SBSQ HOSP IP/OBS SF/LOW 25: CPT | Performed by: NURSE PRACTITIONER

## 2021-09-28 PROCEDURE — 99252 IP/OBS CONSLTJ NEW/EST SF 35: CPT | Mod: 95 | Performed by: STUDENT IN AN ORGANIZED HEALTH CARE EDUCATION/TRAINING PROGRAM

## 2021-09-28 RX ORDER — QUETIAPINE FUMARATE 100 MG/1
200 TABLET, FILM COATED ORAL NIGHTLY
Status: DISCONTINUED | OUTPATIENT
Start: 2021-09-28 | End: 2021-10-04 | Stop reason: HOSPADM

## 2021-09-28 RX ADMIN — OXYCODONE 5 MG: 5 TABLET ORAL at 09:06

## 2021-09-28 RX ADMIN — GUAIFENESIN 600 MG: 600 TABLET, EXTENDED RELEASE ORAL at 18:18

## 2021-09-28 RX ADMIN — ENOXAPARIN SODIUM 30 MG: 30 INJECTION SUBCUTANEOUS at 18:18

## 2021-09-28 RX ADMIN — QUETIAPINE FUMARATE 200 MG: 100 TABLET ORAL at 20:07

## 2021-09-28 RX ADMIN — DOCUSATE SODIUM 100 MG: 100 CAPSULE, LIQUID FILLED ORAL at 18:18

## 2021-09-28 RX ADMIN — QUETIAPINE FUMARATE 50 MG: 100 TABLET ORAL at 04:32

## 2021-09-28 RX ADMIN — POLYETHYLENE GLYCOL 3350 1 PACKET: 17 POWDER, FOR SOLUTION ORAL at 04:33

## 2021-09-28 RX ADMIN — OXYCODONE 5 MG: 5 TABLET ORAL at 18:18

## 2021-09-28 RX ADMIN — GUAIFENESIN 600 MG: 600 TABLET, EXTENDED RELEASE ORAL at 04:33

## 2021-09-28 RX ADMIN — ENOXAPARIN SODIUM 30 MG: 30 INJECTION SUBCUTANEOUS at 04:32

## 2021-09-28 RX ADMIN — POLYETHYLENE GLYCOL 3350 1 PACKET: 17 POWDER, FOR SOLUTION ORAL at 18:18

## 2021-09-28 RX ADMIN — DOCUSATE SODIUM 100 MG: 100 CAPSULE, LIQUID FILLED ORAL at 04:32

## 2021-09-28 RX ADMIN — DOCUSATE SODIUM 50 MG AND SENNOSIDES 8.6 MG 1 TABLET: 8.6; 5 TABLET, FILM COATED ORAL at 20:06

## 2021-09-28 RX ADMIN — MAGNESIUM HYDROXIDE 30 ML: 400 SUSPENSION ORAL at 04:33

## 2021-09-28 ASSESSMENT — ENCOUNTER SYMPTOMS
TINGLING: 0
SENSORY CHANGE: 0
FOCAL WEAKNESS: 0
NECK PAIN: 0
COUGH: 0
FEVER: 0
ABDOMINAL PAIN: 0
HEADACHES: 0
SHORTNESS OF BREATH: 0
SPEECH CHANGE: 0
NAUSEA: 0
DIZZINESS: 0
CHILLS: 0
MYALGIAS: 0
CONSTIPATION: 0
DEPRESSION: 0
SPUTUM PRODUCTION: 1
COUGH: 1
DOUBLE VISION: 0
BACK PAIN: 0
BLURRED VISION: 0
PALPITATIONS: 0
VOMITING: 0

## 2021-09-28 ASSESSMENT — PAIN DESCRIPTION - PAIN TYPE
TYPE: ACUTE PAIN
TYPE: ACUTE PAIN

## 2021-09-28 NOTE — DISCHARGE PLANNING
Agency/Facility Name: [facility]   Spoke To: Martha   Outcome: Per Martha, there are no [facility] locations in or near [place], [place]    Agency/Facility Name: [facility]   Spoke To:    Outcome: There are no [facility] locations in [place], [place]    Agency/Facility Name: [facility]   Spoke To: Jayme   Outcome: Per Jayme, there is a [facility] in [place], [place]  P:    F: [phone]

## 2021-09-28 NOTE — PROGRESS NOTES
Assessment complete.  AA&Ox4.   SpO2 94% on 4L. Denies SOB.  Tolerating regular diet. Denies N/V.  + void.   LBM 9/27/21.   Pt up with SBA.   All needs met at this time. Call light within reach. Pt calls appropriately.    COVID-19 surge in effect.
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
## 2021-09-28 NOTE — ASSESSMENT & PLAN NOTE
Right lower quadrant foreign bodies likely within the cecum and have remains in roughly same area since xray on 10/19.  10/5 Consider GI consult if FB remains in cecum.

## 2021-09-28 NOTE — PROGRESS NOTES
Patient's family at bedside. Patient began complaining that there are crabs in her bed. She feels as though she can smell them. Also paranoid that someone is coming in through the roof of her room at night and stealing her items. Patient also endorsing auditory hallucinations when she is spending time with family. Family requesting psych consult.

## 2021-09-28 NOTE — DISCHARGE PLANNING
Anticipated Discharge Disposition: Home with Home Oxygen.    Action: LSW received a message from Raquel, patient's sister requesting the lodging discount letter. LSW met with patient at bedside.  Patient reports she is discharging to her sister's Raquel's home in [place], [place]. LSW provided the list of lodging and the discount letter to the patient.    Per Trang, Trauma A.P.R.N, patient will discharge out of state on 4 liters of oxygen.    LSW issued choice for DME. Patient agreed to be referred to all the companies that services [place], [place], choice form faxed to DPA. Referral sent to [employer], [employer], [employer], [employer], [employer], [employer], [facility], [facility], [facility]. Preferred, [facility].     LSW explained, [place] Medicaid may not cover the oxygen as patient is relocating out of state. Patient is aware she may be responsible for paying for the oxygen until she switches to [place] Medicaid.    LSW called Raquel, message left requesting a call back to provide patient's discharge address in [place].    Barriers to Discharge: Medical Clearance. Home Oxygen.    Plan: As Above. DPA to send the referral when the DME Order is available.
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
## 2021-09-28 NOTE — CARE PLAN
Problem: Knowledge Deficit - Standard  Goal: Patient and family/care givers will demonstrate understanding of plan of care, disease process/condition, diagnostic tests and medications  Outcome: Progressing     Problem: Pain - Standard  Goal: Alleviation of pain or a reduction in pain to the patient’s comfort goal  Outcome: Progressing     Problem: Skin Integrity  Goal: Skin integrity is maintained or improved  Outcome: Progressing   The patient is Stable - Low risk of patient condition declining or worsening    Shift Goals  Clinical Goals: Pulm toilet, mobility, pass FB  Patient Goals: rest pain  Family Goals: N/A    Progress made toward(s) clinical / shift goals:  patient active with acapella    Patient is not progressing towards the following goals:

## 2021-09-29 ENCOUNTER — APPOINTMENT (OUTPATIENT)
Dept: RADIOLOGY | Facility: MEDICAL CENTER | Age: 36
DRG: 957 | End: 2021-09-29
Attending: NURSE PRACTITIONER
Payer: MEDICAID

## 2021-09-29 LAB
ANION GAP SERPL CALC-SCNC: 9 MMOL/L (ref 7–16)
BASOPHILS # BLD AUTO: 0.6 % (ref 0–1.8)
BASOPHILS # BLD: 0.03 K/UL (ref 0–0.12)
BUN SERPL-MCNC: 6 MG/DL (ref 8–22)
CALCIUM SERPL-MCNC: 9.3 MG/DL (ref 8.5–10.5)
CHLORIDE SERPL-SCNC: 103 MMOL/L (ref 96–112)
CO2 SERPL-SCNC: 26 MMOL/L (ref 20–33)
CREAT SERPL-MCNC: 0.38 MG/DL (ref 0.5–1.4)
EOSINOPHIL # BLD AUTO: 0.64 K/UL (ref 0–0.51)
EOSINOPHIL NFR BLD: 12.2 % (ref 0–6.9)
ERYTHROCYTE [DISTWIDTH] IN BLOOD BY AUTOMATED COUNT: 54.2 FL (ref 35.9–50)
GLUCOSE SERPL-MCNC: 86 MG/DL (ref 65–99)
HCT VFR BLD AUTO: 32.6 % (ref 37–47)
HGB BLD-MCNC: 10.4 G/DL (ref 12–16)
IMM GRANULOCYTES # BLD AUTO: 0.02 K/UL (ref 0–0.11)
IMM GRANULOCYTES NFR BLD AUTO: 0.4 % (ref 0–0.9)
LYMPHOCYTES # BLD AUTO: 1.27 K/UL (ref 1–4.8)
LYMPHOCYTES NFR BLD: 24.2 % (ref 22–41)
MCH RBC QN AUTO: 30.2 PG (ref 27–33)
MCHC RBC AUTO-ENTMCNC: 31.9 G/DL (ref 33.6–35)
MCV RBC AUTO: 94.8 FL (ref 81.4–97.8)
MONOCYTES # BLD AUTO: 0.66 K/UL (ref 0–0.85)
MONOCYTES NFR BLD AUTO: 12.6 % (ref 0–13.4)
NEUTROPHILS # BLD AUTO: 2.62 K/UL (ref 2–7.15)
NEUTROPHILS NFR BLD: 50 % (ref 44–72)
NRBC # BLD AUTO: 0 K/UL
NRBC BLD-RTO: 0 /100 WBC
PLATELET # BLD AUTO: 373 K/UL (ref 164–446)
PMV BLD AUTO: 9.5 FL (ref 9–12.9)
POTASSIUM SERPL-SCNC: 4 MMOL/L (ref 3.6–5.5)
RBC # BLD AUTO: 3.44 M/UL (ref 4.2–5.4)
SODIUM SERPL-SCNC: 138 MMOL/L (ref 135–145)
WBC # BLD AUTO: 5.2 K/UL (ref 4.8–10.8)

## 2021-09-29 PROCEDURE — 700102 HCHG RX REV CODE 250 W/ 637 OVERRIDE(OP): Performed by: NURSE PRACTITIONER

## 2021-09-29 PROCEDURE — 85025 COMPLETE CBC W/AUTO DIFF WBC: CPT

## 2021-09-29 PROCEDURE — 700102 HCHG RX REV CODE 250 W/ 637 OVERRIDE(OP): Performed by: SURGERY

## 2021-09-29 PROCEDURE — 94669 MECHANICAL CHEST WALL OSCILL: CPT

## 2021-09-29 PROCEDURE — A9270 NON-COVERED ITEM OR SERVICE: HCPCS | Performed by: NURSE PRACTITIONER

## 2021-09-29 PROCEDURE — 770001 HCHG ROOM/CARE - MED/SURG/GYN PRIV*

## 2021-09-29 PROCEDURE — A9270 NON-COVERED ITEM OR SERVICE: HCPCS | Performed by: STUDENT IN AN ORGANIZED HEALTH CARE EDUCATION/TRAINING PROGRAM

## 2021-09-29 PROCEDURE — 94760 N-INVAS EAR/PLS OXIMETRY 1: CPT

## 2021-09-29 PROCEDURE — 80048 BASIC METABOLIC PNL TOTAL CA: CPT

## 2021-09-29 PROCEDURE — 700111 HCHG RX REV CODE 636 W/ 250 OVERRIDE (IP): Performed by: SURGERY

## 2021-09-29 PROCEDURE — 71045 X-RAY EXAM CHEST 1 VIEW: CPT

## 2021-09-29 PROCEDURE — 99231 SBSQ HOSP IP/OBS SF/LOW 25: CPT | Mod: 24

## 2021-09-29 PROCEDURE — A9270 NON-COVERED ITEM OR SERVICE: HCPCS

## 2021-09-29 PROCEDURE — 700102 HCHG RX REV CODE 250 W/ 637 OVERRIDE(OP)

## 2021-09-29 PROCEDURE — 700102 HCHG RX REV CODE 250 W/ 637 OVERRIDE(OP): Performed by: STUDENT IN AN ORGANIZED HEALTH CARE EDUCATION/TRAINING PROGRAM

## 2021-09-29 PROCEDURE — A9270 NON-COVERED ITEM OR SERVICE: HCPCS | Performed by: SURGERY

## 2021-09-29 PROCEDURE — 36415 COLL VENOUS BLD VENIPUNCTURE: CPT

## 2021-09-29 RX ORDER — BISACODYL 10 MG
10 SUPPOSITORY, RECTAL RECTAL DAILY
Status: DISCONTINUED | OUTPATIENT
Start: 2021-09-29 | End: 2021-10-04 | Stop reason: HOSPADM

## 2021-09-29 RX ADMIN — DOCUSATE SODIUM 100 MG: 100 CAPSULE, LIQUID FILLED ORAL at 05:16

## 2021-09-29 RX ADMIN — POLYETHYLENE GLYCOL 3350 1 PACKET: 17 POWDER, FOR SOLUTION ORAL at 05:15

## 2021-09-29 RX ADMIN — DOCUSATE SODIUM 50 MG AND SENNOSIDES 8.6 MG 1 TABLET: 8.6; 5 TABLET, FILM COATED ORAL at 19:55

## 2021-09-29 RX ADMIN — OXYCODONE 5 MG: 5 TABLET ORAL at 12:20

## 2021-09-29 RX ADMIN — QUETIAPINE FUMARATE 50 MG: 100 TABLET ORAL at 05:16

## 2021-09-29 RX ADMIN — DOCUSATE SODIUM 100 MG: 100 CAPSULE, LIQUID FILLED ORAL at 17:45

## 2021-09-29 RX ADMIN — BISACODYL 10 MG: 10 SUPPOSITORY RECTAL at 15:11

## 2021-09-29 RX ADMIN — MAGNESIUM HYDROXIDE 30 ML: 400 SUSPENSION ORAL at 05:16

## 2021-09-29 RX ADMIN — ACETAMINOPHEN 650 MG: 325 TABLET, FILM COATED ORAL at 11:01

## 2021-09-29 RX ADMIN — OXYCODONE 5 MG: 5 TABLET ORAL at 19:55

## 2021-09-29 RX ADMIN — ENOXAPARIN SODIUM 30 MG: 30 INJECTION SUBCUTANEOUS at 05:15

## 2021-09-29 RX ADMIN — GUAIFENESIN 600 MG: 600 TABLET, EXTENDED RELEASE ORAL at 05:16

## 2021-09-29 RX ADMIN — POLYETHYLENE GLYCOL 3350 1 PACKET: 17 POWDER, FOR SOLUTION ORAL at 17:45

## 2021-09-29 RX ADMIN — QUETIAPINE FUMARATE 200 MG: 100 TABLET ORAL at 19:55

## 2021-09-29 RX ADMIN — ENOXAPARIN SODIUM 30 MG: 30 INJECTION SUBCUTANEOUS at 17:45

## 2021-09-29 RX ADMIN — OXYCODONE 5 MG: 5 TABLET ORAL at 05:15

## 2021-09-29 RX ADMIN — GUAIFENESIN 600 MG: 600 TABLET, EXTENDED RELEASE ORAL at 17:45

## 2021-09-29 ASSESSMENT — ENCOUNTER SYMPTOMS
FEVER: 0
SENSORY CHANGE: 0
SHORTNESS OF BREATH: 0
VOMITING: 0
DIZZINESS: 0
NAUSEA: 0
HEADACHES: 0
COUGH: 1
ROS GI COMMENTS: LAST BM 9/29
CONSTIPATION: 0
CHILLS: 0
SPEECH CHANGE: 0
DOUBLE VISION: 0
TINGLING: 0
HALLUCINATIONS: 0
SPUTUM PRODUCTION: 0
MYALGIAS: 0
BACK PAIN: 0
FOCAL WEAKNESS: 0
BLURRED VISION: 0
ABDOMINAL PAIN: 0
DIAPHORESIS: 0
NECK PAIN: 1

## 2021-09-29 ASSESSMENT — PAIN DESCRIPTION - PAIN TYPE
TYPE: ACUTE PAIN

## 2021-09-29 ASSESSMENT — FIBROSIS 4 INDEX: FIB4 SCORE: 0.41

## 2021-09-29 NOTE — PROGRESS NOTES
Pt is A&O x4.    Family was bedside this morning before leaving for [place]. Family understands that home 02 needs have to be met before discharge.     Walking 02 eval updated, pt requires 2L when ambulating. Social work and APRN notified.     Family educated and aware of care plan. Family states they will begin the [place] medicare paper work and will pay for home oxygen out of pocket until coverage is established in [place] with [facility] for oxygen. They plan to drive the patient to [place] themselves once she is ready for discharge.     Tele sitter is present for safety. Patient has been pleasant all day, has had 2 bowel movements; No evidence of foreign objects being passed. Patient calls appropriately.    Bed is locked, in lowest position, call light in place will continue to monitor.

## 2021-09-29 NOTE — DISCHARGE PLANNING
Anticipated Discharge Disposition:   Home to sister's house in [place]    Action:   As per RN, sister is willing to come back and  pt when dc. Pt needs home O2. DPA called [facility] and they allow self pay and are aware that pt will need this to go back to [place].     Notified TERESE Figueroa to do a walking saturation study. Message sent to JOSIANE Hill to kindly place an order.     Barriers to Discharge:  Pending home O2 acceptance by [facility]  Pending Medical clearance.    Plan:   Follow up with JOSIANE for home O2 order. Follow up with DPA.

## 2021-09-29 NOTE — CARE PLAN
Shift Goals  Clinical Goals: mobilize  Patient Goals: rest  Family Goals: N/A    Progress made toward(s) clinical / shift goals:     Patient is aware of care plan and calls appropriately. Patient's pain levels are well managed and patient calls for pain treatment as needed. Tele sitter present for safety.       Problem: Knowledge Deficit - Standard  Goal: Patient and family/care givers will demonstrate understanding of plan of care, disease process/condition, diagnostic tests and medications  Outcome: Progressing     Problem: Pain - Standard  Goal: Alleviation of pain or a reduction in pain to the patient’s comfort goal  Outcome: Progressing

## 2021-09-29 NOTE — DISCHARGE PLANNING
Agency/Facility Name: [facility]  Spoke To: Gi  Outcome: Per Gi, this Pt would need to be self pay at $175/month until Pt is moved to [place] medicaid.  Gi stated this referral should be sent to Dave Cloin so Pt can received the tanks and all documents will be sent to the [facility]    -[phone]  Agency/Facility Name: [facility]   Referral sent per Choice form @ [phone]

## 2021-09-29 NOTE — FACE TO FACE
"Face to Face Note  -  Durable Medical Equipment    DAYA Zarate - NPI: [id number]  I certify that this patient is under my care and that they had a durable medical equipment(DME)face to face encounter by myself that meets the physician DME face-to-face encounter requirements with this patient on:    Date of encounter:   Patient:                    MRN:                       YOB: 2020  Yris Edwards  [id number]  1985     The encounter with the patient was in whole, or in part, for the following medical condition, which is the primary reason for durable medical equipment:  Other - bilateral pleural effusions    I certify that, based on my findings, the following durable medical equipment is medically necessary:  Oxygen.    HOME O2 Saturation Measurements:(Values must be present for Home Oxygen orders)  Room air sat at rest: 85  Room air sat with amb: 85  With liters of O2: 2, O2 sat at rest with O2: 95  With Liters of O2: 2, O2 sat with amb with O2 : 93  Is the patient mobile?: Yes    My Clinical findings support the need for the above equipment due to:  Hypoxia    Supporting Symptoms: The patient requires supplemental oxygen, as the following interventions have been tried with limited or no improvement: \"Ambulation with oximetry and \"Incentive spirometry    If patient feels more short of breath, they can go up to 6 liters per minute and contact healthcare provider.  "

## 2021-09-29 NOTE — PROGRESS NOTES
Assessment complete.  AA&Ox4.   SpO2 95% on 4L. Denies SOB.  Tolerating regular diet. Denies N/V.  + void.   LBM 9/28/21.   Pt up with SBA.   All needs met at this time. Call light within reach. Pt calls appropriately.     COVID-19 surge in effect.
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
## 2021-09-30 PROBLEM — N89.8 VAGINAL DISCHARGE: Status: ACTIVE | Noted: 2021-09-30

## 2021-09-30 LAB — EKG IMPRESSION: NORMAL

## 2021-09-30 PROCEDURE — 93005 ELECTROCARDIOGRAM TRACING: CPT

## 2021-09-30 PROCEDURE — 700102 HCHG RX REV CODE 250 W/ 637 OVERRIDE(OP): Performed by: NURSE PRACTITIONER

## 2021-09-30 PROCEDURE — 770001 HCHG ROOM/CARE - MED/SURG/GYN PRIV*

## 2021-09-30 PROCEDURE — A9270 NON-COVERED ITEM OR SERVICE: HCPCS | Performed by: NURSE PRACTITIONER

## 2021-09-30 PROCEDURE — 700111 HCHG RX REV CODE 636 W/ 250 OVERRIDE (IP): Performed by: STUDENT IN AN ORGANIZED HEALTH CARE EDUCATION/TRAINING PROGRAM

## 2021-09-30 PROCEDURE — 700102 HCHG RX REV CODE 250 W/ 637 OVERRIDE(OP)

## 2021-09-30 PROCEDURE — A9270 NON-COVERED ITEM OR SERVICE: HCPCS | Performed by: STUDENT IN AN ORGANIZED HEALTH CARE EDUCATION/TRAINING PROGRAM

## 2021-09-30 PROCEDURE — A9270 NON-COVERED ITEM OR SERVICE: HCPCS

## 2021-09-30 PROCEDURE — 99231 SBSQ HOSP IP/OBS SF/LOW 25: CPT | Mod: 24

## 2021-09-30 PROCEDURE — 700111 HCHG RX REV CODE 636 W/ 250 OVERRIDE (IP): Performed by: SURGERY

## 2021-09-30 PROCEDURE — 700102 HCHG RX REV CODE 250 W/ 637 OVERRIDE(OP): Performed by: STUDENT IN AN ORGANIZED HEALTH CARE EDUCATION/TRAINING PROGRAM

## 2021-09-30 PROCEDURE — A9270 NON-COVERED ITEM OR SERVICE: HCPCS | Performed by: SURGERY

## 2021-09-30 PROCEDURE — 700101 HCHG RX REV CODE 250: Performed by: STUDENT IN AN ORGANIZED HEALTH CARE EDUCATION/TRAINING PROGRAM

## 2021-09-30 PROCEDURE — 94669 MECHANICAL CHEST WALL OSCILL: CPT

## 2021-09-30 PROCEDURE — 93010 ELECTROCARDIOGRAM REPORT: CPT | Performed by: INTERNAL MEDICINE

## 2021-09-30 PROCEDURE — 700102 HCHG RX REV CODE 250 W/ 637 OVERRIDE(OP): Performed by: SURGERY

## 2021-09-30 RX ADMIN — ENOXAPARIN SODIUM 30 MG: 30 INJECTION SUBCUTANEOUS at 05:58

## 2021-09-30 RX ADMIN — ENOXAPARIN SODIUM 30 MG: 30 INJECTION SUBCUTANEOUS at 17:38

## 2021-09-30 RX ADMIN — POLYETHYLENE GLYCOL 3350, SODIUM SULFATE ANHYDROUS, SODIUM BICARBONATE, SODIUM CHLORIDE, POTASSIUM CHLORIDE 4 L: 236; 22.74; 6.74; 5.86; 2.97 POWDER, FOR SOLUTION ORAL at 17:36

## 2021-09-30 RX ADMIN — QUETIAPINE FUMARATE 50 MG: 100 TABLET ORAL at 05:58

## 2021-09-30 RX ADMIN — OXYCODONE 5 MG: 5 TABLET ORAL at 05:58

## 2021-09-30 RX ADMIN — DOCUSATE SODIUM 100 MG: 100 CAPSULE, LIQUID FILLED ORAL at 05:59

## 2021-09-30 RX ADMIN — ACETAMINOPHEN 650 MG: 325 TABLET, FILM COATED ORAL at 10:45

## 2021-09-30 RX ADMIN — POLYETHYLENE GLYCOL 3350 1 PACKET: 17 POWDER, FOR SOLUTION ORAL at 05:58

## 2021-09-30 RX ADMIN — GUAIFENESIN 600 MG: 600 TABLET, EXTENDED RELEASE ORAL at 05:58

## 2021-09-30 RX ADMIN — GUAIFENESIN 600 MG: 600 TABLET, EXTENDED RELEASE ORAL at 17:36

## 2021-09-30 RX ADMIN — OXYCODONE 5 MG: 5 TABLET ORAL at 19:47

## 2021-09-30 RX ADMIN — ACETAMINOPHEN 650 MG: 325 TABLET, FILM COATED ORAL at 17:41

## 2021-09-30 RX ADMIN — BISACODYL 10 MG: 10 SUPPOSITORY RECTAL at 05:59

## 2021-09-30 RX ADMIN — MAGNESIUM HYDROXIDE 30 ML: 400 SUSPENSION ORAL at 05:59

## 2021-09-30 RX ADMIN — OXYCODONE 5 MG: 5 TABLET ORAL at 12:52

## 2021-09-30 RX ADMIN — QUETIAPINE FUMARATE 200 MG: 100 TABLET ORAL at 19:48

## 2021-09-30 ASSESSMENT — ENCOUNTER SYMPTOMS
MYALGIAS: 0
SENSORY CHANGE: 0
DIZZINESS: 0
PALPITATIONS: 0
NECK PAIN: 1
BLURRED VISION: 0
TINGLING: 0
SHORTNESS OF BREATH: 0
ROS GI COMMENTS: LAST BM 9/29
SPEECH CHANGE: 0
DIAPHORESIS: 0
CONSTIPATION: 0
HALLUCINATIONS: 0
BLOOD IN STOOL: 0
NAUSEA: 0
DOUBLE VISION: 0
HEADACHES: 0
SPUTUM PRODUCTION: 0
VOMITING: 0
ABDOMINAL PAIN: 0
COUGH: 0
CHILLS: 0
FOCAL WEAKNESS: 0
FEVER: 0
BACK PAIN: 0

## 2021-09-30 ASSESSMENT — PAIN DESCRIPTION - PAIN TYPE
TYPE: ACUTE PAIN

## 2021-09-30 NOTE — DISCHARGE PLANNING
Anticipated Discharge Disposition: Home to patient's sister in [place] with Home Oxygen.    Action: Per DPA, [facility] accepted this referral; however, Medicaid does not cover the cost as the patient is discharging out of [place]. Per DPA, [facility] will be provided with oxygen tanks prior to discharge but will need to stop at [facility] locations along the way.     Per DPA, patient has to discharge Monday - Friday as the out of state [facility] locations are not able to dispense the oxygen during the weekend.    LSW spoke with Raquel, patient's sister via phone. Raquel agreed to cover the cost of the oxygen, Raquel made the payment to [facility] earlier today.    Per Raquel, she will be in [place] Monday October 4, 2021. Per Raquel, she will drive the patient via car Monday afternoon. The route is from [place], [place], [place], DPA aware.    Raquel stated on 9/26/21, the bedside RN informed her patient will not discharge until the oxygen is discontinue and instructed her to return to [place].     Per Raquel, since she is coming back to [place] Monday to  patient, she is requesting financial assistance or vouchers to help her pay for the travel expenses, LSW escalated to the  Leadership Team.    Barriers to Discharge: None.    Plan: Home on Home Oxygen.

## 2021-09-30 NOTE — CARE PLAN
Shift Goals  Clinical Goals: Pain management; rest  Patient Goals: rest  Family Goals: N/A    Progress made toward(s) clinical / shift goals:    Patient is aware of care plan, calls appropriately for pain medications if needed. Pain turns self and ambulates up to restroom as needed    Problem: Knowledge Deficit - Standard  Goal: Patient and family/care givers will demonstrate understanding of plan of care, disease process/condition, diagnostic tests and medications  Outcome: Progressing     Problem: Pain - Standard  Goal: Alleviation of pain or a reduction in pain to the patient’s comfort goal  Outcome: Progressing     Problem: Skin Integrity  Goal: Skin integrity is maintained or improved  Outcome: Progressing

## 2021-09-30 NOTE — PROGRESS NOTES
Pt is A&O x 4  Somewhat anxious but compliant with care plan and pleasant upon interaction    Pt has had bowel movement this morning with no evidence of metallic objects being passed    Integumentary checked underneath C-collar; patient denies any pain from c-collar. No signs of pressure injury underneath brace on contact areas.    Pt is currently resting in bed, bed is locked, in lowest position, call light in place. Pt calls appropriately. Will continue to monitor.

## 2021-09-30 NOTE — DISCHARGE PLANNING
Agency/Facility Name: [facility]   Spoke To: Jayme  Outcome: Per Jayme, [facility] is not able to provide the appropriate method of 02 for a flight.  [facility] is also unable to provide enough tanks for a 30+ hour drive.  Jayme is requesting the route itinerary ahead of time so they can review it and select appropriate [facility] locations that can provide 02 along the way to to [place].  The Pt would need to call the incoming [facility] location in advance to receive the 02 to go on to the next location.  If any issues arise, the Pt can either call the [facility] Location or the [place] location for help.  Per Jayme, the [facility] is unable to provide a concentrator for an overnight hotel stay, but once the list of [employer] arrive for the Pt, this DPA can call the different [employer] to see if they can provide the concentrator for the overnight hotel stays     LSW notified     [phone]  Agency/Facility Name: [facility]  Spoke To: Gi   Outcome: Per Gi, medicaid may not cover this 02 due to diagnosis.  Gi stated the order and FTF were not received.  DPA to fax to Cornelio Colin   F: [phone]    LSW notified     -[phone]  Agency/Facility Name: [facility]  Spoke To: Parth  Outcome: Per conversation with Pt's sister and LSW, Pt will drive from [place] to  to [place] to [place], staying the nights in  and outside of [place].  Parth stated he would work on getting addresses sot he Pt and sister know where to go to get the tanks replaced     LSW notified

## 2021-09-30 NOTE — PROGRESS NOTES
Trauma Progress Note    Patient was agreeable to vaginal speculum exam after expressing concern that she felt she still had foreign bodies in her vagina. JOSIANE Morel was present during this evaluation. The exam revealed copious malaodorous thin tan discharge. I was unable to visualize the cervix due to the large amount of discharge. No foreign bodies were visualized during the exam. A sample was obtained of vaginal fluid and sent to be tested for chlamydia, gonorrhea, bacterial vaginosis, and trichomoniasis.
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
## 2021-10-01 ENCOUNTER — ANESTHESIA (OUTPATIENT)
Dept: SURGERY | Facility: MEDICAL CENTER | Age: 36
DRG: 957 | End: 2021-10-01
Payer: MEDICAID

## 2021-10-01 ENCOUNTER — APPOINTMENT (OUTPATIENT)
Dept: RADIOLOGY | Facility: MEDICAL CENTER | Age: 36
DRG: 957 | End: 2021-10-01
Attending: STUDENT IN AN ORGANIZED HEALTH CARE EDUCATION/TRAINING PROGRAM
Payer: MEDICAID

## 2021-10-01 ENCOUNTER — APPOINTMENT (OUTPATIENT)
Dept: RADIOLOGY | Facility: MEDICAL CENTER | Age: 36
DRG: 957 | End: 2021-10-01
Attending: NURSE PRACTITIONER
Payer: MEDICAID

## 2021-10-01 ENCOUNTER — ANESTHESIA EVENT (OUTPATIENT)
Dept: SURGERY | Facility: MEDICAL CENTER | Age: 36
DRG: 957 | End: 2021-10-01
Payer: MEDICAID

## 2021-10-01 LAB
EXTERNAL QUALITY CONTROL: NORMAL
HCG UR QL: NEGATIVE
PATHOLOGY CONSULT NOTE: NORMAL
SARS-COV+SARS-COV-2 AG RESP QL IA.RAPID: NEGATIVE

## 2021-10-01 PROCEDURE — 700102 HCHG RX REV CODE 250 W/ 637 OVERRIDE(OP): Performed by: NURSE PRACTITIONER

## 2021-10-01 PROCEDURE — 81025 URINE PREGNANCY TEST: CPT

## 2021-10-01 PROCEDURE — A9270 NON-COVERED ITEM OR SERVICE: HCPCS | Performed by: NURSE PRACTITIONER

## 2021-10-01 PROCEDURE — 88305 TISSUE EXAM BY PATHOLOGIST: CPT

## 2021-10-01 PROCEDURE — 160002 HCHG RECOVERY MINUTES (STAT): Performed by: INTERNAL MEDICINE

## 2021-10-01 PROCEDURE — 0DBL8ZZ EXCISION OF TRANSVERSE COLON, VIA NATURAL OR ARTIFICIAL OPENING ENDOSCOPIC: ICD-10-PCS | Performed by: INTERNAL MEDICINE

## 2021-10-01 PROCEDURE — 700111 HCHG RX REV CODE 636 W/ 250 OVERRIDE (IP): Performed by: STUDENT IN AN ORGANIZED HEALTH CARE EDUCATION/TRAINING PROGRAM

## 2021-10-01 PROCEDURE — 700101 HCHG RX REV CODE 250: Performed by: ANESTHESIOLOGY

## 2021-10-01 PROCEDURE — 0DCH8ZZ EXTIRPATION OF MATTER FROM CECUM, VIA NATURAL OR ARTIFICIAL OPENING ENDOSCOPIC: ICD-10-PCS | Performed by: INTERNAL MEDICINE

## 2021-10-01 PROCEDURE — 700111 HCHG RX REV CODE 636 W/ 250 OVERRIDE (IP): Performed by: ANESTHESIOLOGY

## 2021-10-01 PROCEDURE — 160035 HCHG PACU - 1ST 60 MINS PHASE I: Performed by: INTERNAL MEDICINE

## 2021-10-01 PROCEDURE — 160041 HCHG SURGERY MINUTES - EA ADDL 1 MIN LEVEL 4: Performed by: INTERNAL MEDICINE

## 2021-10-01 PROCEDURE — A9270 NON-COVERED ITEM OR SERVICE: HCPCS | Performed by: STUDENT IN AN ORGANIZED HEALTH CARE EDUCATION/TRAINING PROGRAM

## 2021-10-01 PROCEDURE — 160029 HCHG SURGERY MINUTES - 1ST 30 MINS LEVEL 4: Performed by: INTERNAL MEDICINE

## 2021-10-01 PROCEDURE — 71045 X-RAY EXAM CHEST 1 VIEW: CPT

## 2021-10-01 PROCEDURE — 160048 HCHG OR STATISTICAL LEVEL 1-5: Performed by: INTERNAL MEDICINE

## 2021-10-01 PROCEDURE — 87426 SARSCOV CORONAVIRUS AG IA: CPT | Performed by: INTERNAL MEDICINE

## 2021-10-01 PROCEDURE — 74018 RADEX ABDOMEN 1 VIEW: CPT

## 2021-10-01 PROCEDURE — 700105 HCHG RX REV CODE 258: Performed by: ANESTHESIOLOGY

## 2021-10-01 PROCEDURE — 94669 MECHANICAL CHEST WALL OSCILL: CPT

## 2021-10-01 PROCEDURE — 99231 SBSQ HOSP IP/OBS SF/LOW 25: CPT | Mod: 24

## 2021-10-01 PROCEDURE — 770001 HCHG ROOM/CARE - MED/SURG/GYN PRIV*

## 2021-10-01 PROCEDURE — 160009 HCHG ANES TIME/MIN: Performed by: INTERNAL MEDICINE

## 2021-10-01 PROCEDURE — 700102 HCHG RX REV CODE 250 W/ 637 OVERRIDE(OP): Performed by: STUDENT IN AN ORGANIZED HEALTH CARE EDUCATION/TRAINING PROGRAM

## 2021-10-01 RX ORDER — ONDANSETRON 2 MG/ML
4 INJECTION INTRAMUSCULAR; INTRAVENOUS
Status: DISCONTINUED | OUTPATIENT
Start: 2021-10-01 | End: 2021-10-01 | Stop reason: HOSPADM

## 2021-10-01 RX ORDER — OXYCODONE HCL 5 MG/5 ML
10 SOLUTION, ORAL ORAL
Status: DISCONTINUED | OUTPATIENT
Start: 2021-10-01 | End: 2021-10-01 | Stop reason: HOSPADM

## 2021-10-01 RX ORDER — LIDOCAINE HYDROCHLORIDE 20 MG/ML
INJECTION, SOLUTION EPIDURAL; INFILTRATION; INTRACAUDAL; PERINEURAL PRN
Status: DISCONTINUED | OUTPATIENT
Start: 2021-10-01 | End: 2021-10-01 | Stop reason: SURG

## 2021-10-01 RX ORDER — SODIUM CHLORIDE, SODIUM LACTATE, POTASSIUM CHLORIDE, CALCIUM CHLORIDE 600; 310; 30; 20 MG/100ML; MG/100ML; MG/100ML; MG/100ML
INJECTION, SOLUTION INTRAVENOUS
Status: DISCONTINUED | OUTPATIENT
Start: 2021-10-01 | End: 2021-10-01 | Stop reason: SURG

## 2021-10-01 RX ORDER — MEPERIDINE HYDROCHLORIDE 25 MG/ML
12.5 INJECTION INTRAMUSCULAR; INTRAVENOUS; SUBCUTANEOUS
Status: DISCONTINUED | OUTPATIENT
Start: 2021-10-01 | End: 2021-10-01 | Stop reason: HOSPADM

## 2021-10-01 RX ORDER — HYDRALAZINE HYDROCHLORIDE 20 MG/ML
5 INJECTION INTRAMUSCULAR; INTRAVENOUS
Status: DISCONTINUED | OUTPATIENT
Start: 2021-10-01 | End: 2021-10-01 | Stop reason: HOSPADM

## 2021-10-01 RX ORDER — DIPHENHYDRAMINE HYDROCHLORIDE 50 MG/ML
12.5 INJECTION INTRAMUSCULAR; INTRAVENOUS
Status: DISCONTINUED | OUTPATIENT
Start: 2021-10-01 | End: 2021-10-01 | Stop reason: HOSPADM

## 2021-10-01 RX ORDER — SODIUM CHLORIDE, SODIUM LACTATE, POTASSIUM CHLORIDE, CALCIUM CHLORIDE 600; 310; 30; 20 MG/100ML; MG/100ML; MG/100ML; MG/100ML
INJECTION, SOLUTION INTRAVENOUS CONTINUOUS
Status: DISCONTINUED | OUTPATIENT
Start: 2021-10-01 | End: 2021-10-01 | Stop reason: HOSPADM

## 2021-10-01 RX ORDER — METOPROLOL TARTRATE 1 MG/ML
1 INJECTION, SOLUTION INTRAVENOUS
Status: DISCONTINUED | OUTPATIENT
Start: 2021-10-01 | End: 2021-10-01 | Stop reason: HOSPADM

## 2021-10-01 RX ORDER — MIDAZOLAM HYDROCHLORIDE 1 MG/ML
1 INJECTION INTRAMUSCULAR; INTRAVENOUS
Status: DISCONTINUED | OUTPATIENT
Start: 2021-10-01 | End: 2021-10-01 | Stop reason: HOSPADM

## 2021-10-01 RX ORDER — PHENYLEPHRINE HCL IN 0.9% NACL 0.5 MG/5ML
SYRINGE (ML) INTRAVENOUS PRN
Status: DISCONTINUED | OUTPATIENT
Start: 2021-10-01 | End: 2021-10-01 | Stop reason: SURG

## 2021-10-01 RX ORDER — LABETALOL HYDROCHLORIDE 5 MG/ML
5 INJECTION, SOLUTION INTRAVENOUS
Status: DISCONTINUED | OUTPATIENT
Start: 2021-10-01 | End: 2021-10-01 | Stop reason: HOSPADM

## 2021-10-01 RX ORDER — OXYCODONE HCL 5 MG/5 ML
5 SOLUTION, ORAL ORAL
Status: DISCONTINUED | OUTPATIENT
Start: 2021-10-01 | End: 2021-10-01 | Stop reason: HOSPADM

## 2021-10-01 RX ORDER — HALOPERIDOL 5 MG/ML
1 INJECTION INTRAMUSCULAR
Status: DISCONTINUED | OUTPATIENT
Start: 2021-10-01 | End: 2021-10-01 | Stop reason: HOSPADM

## 2021-10-01 RX ADMIN — PROPOFOL 50 MG: 10 INJECTION, EMULSION INTRAVENOUS at 11:26

## 2021-10-01 RX ADMIN — PROPOFOL 50 MG: 10 INJECTION, EMULSION INTRAVENOUS at 11:10

## 2021-10-01 RX ADMIN — PROPOFOL 50 MG: 10 INJECTION, EMULSION INTRAVENOUS at 10:43

## 2021-10-01 RX ADMIN — PROPOFOL 50 MG: 10 INJECTION, EMULSION INTRAVENOUS at 11:15

## 2021-10-01 RX ADMIN — PROPOFOL 50 MG: 10 INJECTION, EMULSION INTRAVENOUS at 10:46

## 2021-10-01 RX ADMIN — OXYCODONE 5 MG: 5 TABLET ORAL at 13:03

## 2021-10-01 RX ADMIN — OXYCODONE 5 MG: 5 TABLET ORAL at 19:46

## 2021-10-01 RX ADMIN — QUETIAPINE FUMARATE 50 MG: 100 TABLET ORAL at 05:22

## 2021-10-01 RX ADMIN — GUAIFENESIN 600 MG: 600 TABLET, EXTENDED RELEASE ORAL at 05:22

## 2021-10-01 RX ADMIN — Medication 100 MCG: at 10:51

## 2021-10-01 RX ADMIN — PROPOFOL 50 MG: 10 INJECTION, EMULSION INTRAVENOUS at 10:59

## 2021-10-01 RX ADMIN — SODIUM CHLORIDE, POTASSIUM CHLORIDE, SODIUM LACTATE AND CALCIUM CHLORIDE: 600; 310; 30; 20 INJECTION, SOLUTION INTRAVENOUS at 10:31

## 2021-10-01 RX ADMIN — PROPOFOL 50 MG: 10 INJECTION, EMULSION INTRAVENOUS at 10:55

## 2021-10-01 RX ADMIN — ACETAMINOPHEN 650 MG: 325 TABLET, FILM COATED ORAL at 17:29

## 2021-10-01 RX ADMIN — PROPOFOL 50 MG: 10 INJECTION, EMULSION INTRAVENOUS at 11:20

## 2021-10-01 RX ADMIN — PROPOFOL 50 MG: 10 INJECTION, EMULSION INTRAVENOUS at 10:50

## 2021-10-01 RX ADMIN — PROPOFOL 50 MG: 10 INJECTION, EMULSION INTRAVENOUS at 11:06

## 2021-10-01 RX ADMIN — PROPOFOL 50 MG: 10 INJECTION, EMULSION INTRAVENOUS at 10:47

## 2021-10-01 RX ADMIN — ACETAMINOPHEN 650 MG: 325 TABLET, FILM COATED ORAL at 08:37

## 2021-10-01 RX ADMIN — ENOXAPARIN SODIUM 30 MG: 30 INJECTION SUBCUTANEOUS at 17:29

## 2021-10-01 RX ADMIN — OXYCODONE 5 MG: 5 TABLET ORAL at 05:22

## 2021-10-01 RX ADMIN — Medication 50 MCG: at 10:57

## 2021-10-01 RX ADMIN — GUAIFENESIN 600 MG: 600 TABLET, EXTENDED RELEASE ORAL at 17:29

## 2021-10-01 RX ADMIN — LIDOCAINE HYDROCHLORIDE 50 MG: 20 INJECTION, SOLUTION EPIDURAL; INFILTRATION; INTRACAUDAL at 10:43

## 2021-10-01 RX ADMIN — PROPOFOL 50 MG: 10 INJECTION, EMULSION INTRAVENOUS at 10:53

## 2021-10-01 RX ADMIN — QUETIAPINE FUMARATE 200 MG: 100 TABLET ORAL at 20:38

## 2021-10-01 ASSESSMENT — ENCOUNTER SYMPTOMS
CONSTIPATION: 0
ROS GI COMMENTS: LAST BM 10/1
TINGLING: 0
NECK PAIN: 0
DIZZINESS: 0
BACK PAIN: 0
HALLUCINATIONS: 0
SPEECH CHANGE: 0
DIAPHORESIS: 0
VOMITING: 0
MYALGIAS: 0
ABDOMINAL PAIN: 0
COUGH: 0
FOCAL WEAKNESS: 0
HEADACHES: 0
SHORTNESS OF BREATH: 0
SENSORY CHANGE: 0
DOUBLE VISION: 0
CHILLS: 0
NAUSEA: 0
BLURRED VISION: 0
FEVER: 0
SPUTUM PRODUCTION: 0

## 2021-10-01 ASSESSMENT — PAIN DESCRIPTION - PAIN TYPE
TYPE: ACUTE PAIN
TYPE: ACUTE PAIN

## 2021-10-01 NOTE — OR NURSING
1135 - Pt to PACU 1 from OR.  Bedside report from anesthesiologist and RN.  Attached to monitoring, VSS, breathing is calm and unlabored. 4L mask titrated to 2L NC.  Pt denies pain or nausea at this time.     1143 - Doctor bedside to update patient.  States ok to start with clear liquid diet and advance as tolerated.     1150 - Pt having some juice/soda.  Tolerating well at this time. VSS, 2L NC    1151 - Report to bedside RN Shannan.     1200 - patient having chester crackers.    1205 - Pt taken to T422 from PACU with RN escort via cain.  Pt on 2L NC and tolerated well.  Pt placed back on oxygen and given call light before leaving room. Bedside RN aware patient back to floor.

## 2021-10-01 NOTE — OR SURGEON
Post OP Note    PreOp Diagnosis: Foreign body appreciated within the colon      PostOp Diagnosis:    1/foreign body retrieved from the cecum using a Ordoñez net and   rat-tooth   2/polypectomy in the hepatic flexure 5 mm sessile snared removed and retrieved using cold cautery   3/otherwise normal colonoscopy      Procedure(s):  COLONOSCOPY - Wound Class: Clean Contaminated  REMOVAL, FOREIGN BODY - Wound Class: Clean Contaminated  COLONOSCOPY, WITH POLYPECTOMY - Wound Class: Clean Contaminated    Surgeon(s):  Aidan Zaragoza M.D.    Anesthesiologist/Type of Anesthesia:  Anesthesiologist: Jr Nelson M.D./General    Surgical Staff:  Circulator: Herbert Perrin R.N.  Endoscopy Technician: Elli Forte; Wilberto Casper  Endoscopy Nurse: Tess Oscar R.N.    Specimens removed if any:  ID Type Source Tests Collected by Time Destination   A : POLP Polyp Colon PATHOLOGY SPECIMEN Aidan Zaragoza M.D. 10/1/2021 11:22 AM        Estimated Blood Loss: none    Findings:     Prior to the procedure the patient was informed the risks and benefits of the procedure and freely signed the consent form she was monitored in standard monitoring blood pressure oxygen heart monitor no appreciable abnormalities noted during the procedure.  She was presedated and placed in the left lateral decubitus position.  Digital rectal exam is nontender no internal or external hemorrhoids appreciated.  Using a standard variable stiffness Olympus colonoscope introduced under manual insertion passed the cecum identified by appendiceal orifice ileocecal valve and cecal cap.  Within the cecum there are multiple metal fragments consistent with the findings on x-ray.  Using a Ordoñez net and rat tooth repeated colonoscopies were performed to extract all the remnants of the key chain that she swallowed.  In the hepatic flexure there is a 5 mm sessile polyp that was snared retrieved and removed using cold cautery.  The rest of the colon involving  the ascending transverse descending sigmoid and rectum.  Appeared normal.  Excess air was removed and the patient tolerated the procedure well.    Complications: none    Recommendation    Follow pathology results at [facility] [phone] on discharge  Repeat colonoscopy if consistent with tubular adenoma in 5 years  Advance diet to clears then as tolerated  If there are any further questions or concerns please feel free to give us call at the above indicated number  We will sign off      10/1/2021 11:36 AM Aidan Zaragoza M.D.
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
## 2021-10-01 NOTE — ANESTHESIA TIME REPORT
Anesthesia Start and Stop Event Times     Date Time Event    10/1/2021 1028 Ready for Procedure     1039 Anesthesia Start     1139 Anesthesia Stop        Responsible Staff  10/01/21    Name Role Begin End    Jr Nelson M.D. Anesth 1039 1139        Preop Diagnosis (Free Text):  Pre-op Diagnosis     metallic foreign object in cecum, persistent        Preop Diagnosis (Codes):    Premium Reason  Non-Premium    Comments: metallic foreign object in cecum, persistent

## 2021-10-01 NOTE — ANESTHESIA POSTPROCEDURE EVALUATION
Patient: Yris Edwards    Procedure Summary     Date: 10/01/21 Room / Location: [facility] ROOM 26 / SURGERY SAME DAY [facility]    Anesthesia Start: 1039 Anesthesia Stop: 1139    Procedures:       COLONOSCOPY (N/A Anus)      REMOVAL, FOREIGN BODY (N/A Anus)      COLONOSCOPY, WITH POLYPECTOMY (N/A Anus) Diagnosis: (COLON POLYP, FOREIGN BODY REMOVAL)    Surgeons: Aidan Zaragoza M.D. Responsible Provider: Jr Nelson M.D.    Anesthesia Type: MAC ASA Status: 2          Final Anesthesia Type: MAC  Last vitals  BP   Blood Pressure: (!) 99/64    Temp   36.2 °C (97.1 °F)    Pulse   93   Resp   16    SpO2   98 %      Anesthesia Post Evaluation    Patient location during evaluation: PACU  Patient participation: complete - patient participated  Level of consciousness: awake and alert    Airway patency: patent  Anesthetic complications: no  Cardiovascular status: hemodynamically stable  Respiratory status: acceptable  Hydration status: euvolemic    PONV: none          No complications documented.     Nurse Pain Score: 0 (NPRS)
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
## 2021-10-01 NOTE — ASSESSMENT & PLAN NOTE
9/30 Copious malaodorous thin tan discharge on vaginal exam  -Gonorrhea, chlamydia, trichomoniasis, and bacterial vaginosis labs pending

## 2021-10-02 ENCOUNTER — APPOINTMENT (OUTPATIENT)
Dept: RADIOLOGY | Facility: MEDICAL CENTER | Age: 36
DRG: 957 | End: 2021-10-02
Attending: STUDENT IN AN ORGANIZED HEALTH CARE EDUCATION/TRAINING PROGRAM
Payer: MEDICAID

## 2021-10-02 PROCEDURE — 700102 HCHG RX REV CODE 250 W/ 637 OVERRIDE(OP): Performed by: SURGERY

## 2021-10-02 PROCEDURE — 700102 HCHG RX REV CODE 250 W/ 637 OVERRIDE(OP): Performed by: NURSE PRACTITIONER

## 2021-10-02 PROCEDURE — A9270 NON-COVERED ITEM OR SERVICE: HCPCS | Performed by: NURSE PRACTITIONER

## 2021-10-02 PROCEDURE — A9270 NON-COVERED ITEM OR SERVICE: HCPCS | Performed by: SURGERY

## 2021-10-02 PROCEDURE — 74018 RADEX ABDOMEN 1 VIEW: CPT

## 2021-10-02 PROCEDURE — 94760 N-INVAS EAR/PLS OXIMETRY 1: CPT

## 2021-10-02 PROCEDURE — 99231 SBSQ HOSP IP/OBS SF/LOW 25: CPT | Mod: 24

## 2021-10-02 PROCEDURE — 770001 HCHG ROOM/CARE - MED/SURG/GYN PRIV*

## 2021-10-02 PROCEDURE — A9270 NON-COVERED ITEM OR SERVICE: HCPCS | Performed by: STUDENT IN AN ORGANIZED HEALTH CARE EDUCATION/TRAINING PROGRAM

## 2021-10-02 PROCEDURE — 700102 HCHG RX REV CODE 250 W/ 637 OVERRIDE(OP): Performed by: STUDENT IN AN ORGANIZED HEALTH CARE EDUCATION/TRAINING PROGRAM

## 2021-10-02 PROCEDURE — 700111 HCHG RX REV CODE 636 W/ 250 OVERRIDE (IP): Performed by: STUDENT IN AN ORGANIZED HEALTH CARE EDUCATION/TRAINING PROGRAM

## 2021-10-02 PROCEDURE — 94669 MECHANICAL CHEST WALL OSCILL: CPT

## 2021-10-02 RX ADMIN — OXYCODONE 5 MG: 5 TABLET ORAL at 04:39

## 2021-10-02 RX ADMIN — POLYETHYLENE GLYCOL 3350 1 PACKET: 17 POWDER, FOR SOLUTION ORAL at 16:53

## 2021-10-02 RX ADMIN — QUETIAPINE FUMARATE 200 MG: 100 TABLET ORAL at 20:30

## 2021-10-02 RX ADMIN — GUAIFENESIN 600 MG: 600 TABLET, EXTENDED RELEASE ORAL at 04:36

## 2021-10-02 RX ADMIN — ACETAMINOPHEN 650 MG: 325 TABLET, FILM COATED ORAL at 10:48

## 2021-10-02 RX ADMIN — GUAIFENESIN 600 MG: 600 TABLET, EXTENDED RELEASE ORAL at 16:53

## 2021-10-02 RX ADMIN — ENOXAPARIN SODIUM 30 MG: 30 INJECTION SUBCUTANEOUS at 04:36

## 2021-10-02 RX ADMIN — ENOXAPARIN SODIUM 30 MG: 30 INJECTION SUBCUTANEOUS at 16:53

## 2021-10-02 RX ADMIN — OXYCODONE 5 MG: 5 TABLET ORAL at 15:13

## 2021-10-02 RX ADMIN — DOCUSATE SODIUM 100 MG: 100 CAPSULE, LIQUID FILLED ORAL at 16:53

## 2021-10-02 RX ADMIN — QUETIAPINE FUMARATE 50 MG: 100 TABLET ORAL at 04:36

## 2021-10-02 ASSESSMENT — ENCOUNTER SYMPTOMS
TINGLING: 0
ROS GI COMMENTS: LAST BM 10/1
SENSORY CHANGE: 0
DIAPHORESIS: 0
DOUBLE VISION: 0
MYALGIAS: 0
NAUSEA: 0
SPEECH CHANGE: 0
FOCAL WEAKNESS: 0
SHORTNESS OF BREATH: 0
HALLUCINATIONS: 0
ABDOMINAL PAIN: 0
NECK PAIN: 0
CONSTIPATION: 0
FEVER: 0
BACK PAIN: 0
SPUTUM PRODUCTION: 0
BLURRED VISION: 0
VOMITING: 0
COUGH: 0
HEADACHES: 0
DIZZINESS: 0
CHILLS: 0

## 2021-10-02 ASSESSMENT — PAIN DESCRIPTION - PAIN TYPE
TYPE: ACUTE PAIN
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
## 2021-10-02 NOTE — PROGRESS NOTES
Report received from Day RN at 1915. Assumed care of patient. Plan of care discussed, questions answered. Assessment completed. VSS, A&O x4, denies chest pain or SOB at this time, states pain on neck. See MAR. Call light in reach. Patient educated on use of call light for assistance.    Up ad nicky in room  C-collar on at all times  Telesitter in place for removing oxygen    COVID-19 surge in effect.

## 2021-10-02 NOTE — CARE PLAN
Problem: Knowledge Deficit - Standard  Goal: Patient and family/care givers will demonstrate understanding of plan of care, disease process/condition, diagnostic tests and medications  Outcome: Progressing     Problem: Pain - Standard  Goal: Alleviation of pain or a reduction in pain to the patient’s comfort goal  Outcome: Progressing    The patient is Stable - Low risk of patient condition declining or worsening    Shift Goals  Clinical Goals: Colonoscopy  Patient Goals: rest  Family Goals: N/A    Progress made toward(s) clinical / shift goals:  pain control, see MAR. Oxygen per protocol. POC discussed, questions answered.    Patient is not progressing towards the following goals:

## 2021-10-03 PROCEDURE — A9270 NON-COVERED ITEM OR SERVICE: HCPCS | Performed by: NURSE PRACTITIONER

## 2021-10-03 PROCEDURE — A9270 NON-COVERED ITEM OR SERVICE: HCPCS | Performed by: STUDENT IN AN ORGANIZED HEALTH CARE EDUCATION/TRAINING PROGRAM

## 2021-10-03 PROCEDURE — RXMED WILLOW AMBULATORY MEDICATION CHARGE

## 2021-10-03 PROCEDURE — 700102 HCHG RX REV CODE 250 W/ 637 OVERRIDE(OP): Performed by: NURSE PRACTITIONER

## 2021-10-03 PROCEDURE — 700111 HCHG RX REV CODE 636 W/ 250 OVERRIDE (IP): Performed by: STUDENT IN AN ORGANIZED HEALTH CARE EDUCATION/TRAINING PROGRAM

## 2021-10-03 PROCEDURE — 770001 HCHG ROOM/CARE - MED/SURG/GYN PRIV*

## 2021-10-03 PROCEDURE — 700102 HCHG RX REV CODE 250 W/ 637 OVERRIDE(OP): Performed by: STUDENT IN AN ORGANIZED HEALTH CARE EDUCATION/TRAINING PROGRAM

## 2021-10-03 PROCEDURE — 99231 SBSQ HOSP IP/OBS SF/LOW 25: CPT | Mod: 24

## 2021-10-03 PROCEDURE — A9270 NON-COVERED ITEM OR SERVICE: HCPCS | Performed by: SURGERY

## 2021-10-03 PROCEDURE — 700102 HCHG RX REV CODE 250 W/ 637 OVERRIDE(OP): Performed by: SURGERY

## 2021-10-03 RX ORDER — QUETIAPINE FUMARATE 200 MG/1
200 TABLET, FILM COATED ORAL
Qty: 7 TABLET | Refills: 0 | Status: SHIPPED | OUTPATIENT
Start: 2021-10-03 | End: 2021-10-11

## 2021-10-03 RX ORDER — ACETAMINOPHEN 325 MG/1
650 TABLET ORAL EVERY 4 HOURS PRN
Qty: 30 TABLET | Refills: 0 | COMMUNITY
Start: 2021-10-03

## 2021-10-03 RX ORDER — OXYCODONE HYDROCHLORIDE 5 MG/1
5 TABLET ORAL EVERY 8 HOURS PRN
Qty: 21 TABLET | Refills: 0 | Status: SHIPPED | OUTPATIENT
Start: 2021-10-03 | End: 2021-10-11

## 2021-10-03 RX ORDER — QUETIAPINE FUMARATE 50 MG/1
50 TABLET, FILM COATED ORAL EVERY MORNING
Qty: 7 TABLET | Refills: 0 | Status: SHIPPED | OUTPATIENT
Start: 2021-10-04 | End: 2021-10-11

## 2021-10-03 RX ADMIN — DOCUSATE SODIUM 100 MG: 100 CAPSULE, LIQUID FILLED ORAL at 05:31

## 2021-10-03 RX ADMIN — QUETIAPINE FUMARATE 200 MG: 100 TABLET ORAL at 20:02

## 2021-10-03 RX ADMIN — QUETIAPINE FUMARATE 50 MG: 100 TABLET ORAL at 05:31

## 2021-10-03 RX ADMIN — GUAIFENESIN 600 MG: 600 TABLET, EXTENDED RELEASE ORAL at 05:31

## 2021-10-03 RX ADMIN — OXYCODONE 5 MG: 5 TABLET ORAL at 05:31

## 2021-10-03 RX ADMIN — ENOXAPARIN SODIUM 30 MG: 30 INJECTION SUBCUTANEOUS at 05:31

## 2021-10-03 RX ADMIN — DOCUSATE SODIUM 50 MG AND SENNOSIDES 8.6 MG 1 TABLET: 8.6; 5 TABLET, FILM COATED ORAL at 20:02

## 2021-10-03 RX ADMIN — OXYCODONE 5 MG: 5 TABLET ORAL at 10:21

## 2021-10-03 RX ADMIN — OXYCODONE 5 MG: 5 TABLET ORAL at 16:29

## 2021-10-03 RX ADMIN — POLYETHYLENE GLYCOL 3350 1 PACKET: 17 POWDER, FOR SOLUTION ORAL at 05:31

## 2021-10-03 RX ADMIN — GUAIFENESIN 600 MG: 600 TABLET, EXTENDED RELEASE ORAL at 16:08

## 2021-10-03 RX ADMIN — ACETAMINOPHEN 650 MG: 325 TABLET, FILM COATED ORAL at 20:02

## 2021-10-03 RX ADMIN — ENOXAPARIN SODIUM 30 MG: 30 INJECTION SUBCUTANEOUS at 16:09

## 2021-10-03 ASSESSMENT — PAIN DESCRIPTION - PAIN TYPE
TYPE: ACUTE PAIN
TYPE: ACUTE PAIN

## 2021-10-03 ASSESSMENT — ENCOUNTER SYMPTOMS
MYALGIAS: 0
NAUSEA: 0
HALLUCINATIONS: 0
FEVER: 0
TINGLING: 0
COUGH: 0
SENSORY CHANGE: 0
HEADACHES: 0
CHILLS: 0
ROS GI COMMENTS: LAST BM 10/2
DIZZINESS: 0
BACK PAIN: 0
BLURRED VISION: 0
DIAPHORESIS: 0
SPUTUM PRODUCTION: 0
ABDOMINAL PAIN: 0
CONSTIPATION: 0
VOMITING: 0
WEAKNESS: 0
SHORTNESS OF BREATH: 0
NECK PAIN: 0
DOUBLE VISION: 0
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
## 2021-10-03 NOTE — PROGRESS NOTES
Report received from Day RN at 1915. Assumed care of patient. Plan of care discussed, questions answered. Assessment completed. VSS, A&O x4, denies chest pain or SOB at this time, states pain on neck. See MAR. Call light in reach. Patient educated on use of call light for assistance.     Up ad nicky in room  C-collar on at all times  Telesitter in place for removing oxygen.     COVID-19 surge in effect.
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
## 2021-10-03 NOTE — DISCHARGE PLANNING
Anticipated Discharge Disposition: Home with sister to [place] and home oxygen    Action: Per JOSIANE the patient will be discharging tomorrow with her sister. Per previous LSW notes the patient will be driving back to [facility] with her sister. The patient will have over night stops in [place] and near [facility]. The final stop will be [facility]. Per notes the patient will need to stop at [facility] locations to  tanks along the way.     TERESE HILL called [facility] to notify of patient's discharge and follow up on referral. TERESE HILL spoke with Micaela at [facility]. Per Micaela they have everything they need to process the oxygen order for delivery. Micaela stated [facility] is running low on tanks and concentrator, but there should not be an issue with delivering enough oxygen tanks to get to [place] and a concentrator for her overnight hotel stay. Micaela will follow up with [facility]RYSTAL Madrigal tomorrow for delivery status and if they are able to deliver concentrator as well. If they are unable to deliver a concentrator then the patient will have to stop at the [facility] location in [place] to  a concentrator.     [facility] locations based on patient's stops:     [address] [address]  Phone:[phone]  Fax: [phone]    [address] [address]  Phone: [phone]  Fax: [phone]    [place], [place]   [address] [place] [address]  Phone: [phone]  Fax: [phone]      Barriers to Discharge: DME tank and concentrator delivery    Plan: Follow up with medical team.

## 2021-10-04 ENCOUNTER — PHARMACY VISIT (OUTPATIENT)
Dept: PHARMACY | Facility: MEDICAL CENTER | Age: 36
End: 2021-10-04
Payer: MEDICARE

## 2021-10-04 ENCOUNTER — APPOINTMENT (OUTPATIENT)
Dept: RADIOLOGY | Facility: MEDICAL CENTER | Age: 36
DRG: 957 | End: 2021-10-04
Attending: NURSE PRACTITIONER
Payer: MEDICAID

## 2021-10-04 VITALS
HEART RATE: 105 BPM | BODY MASS INDEX: 20.96 KG/M2 | OXYGEN SATURATION: 91 % | TEMPERATURE: 97.4 F | SYSTOLIC BLOOD PRESSURE: 105 MMHG | WEIGHT: 122.8 LBS | RESPIRATION RATE: 18 BRPM | DIASTOLIC BLOOD PRESSURE: 79 MMHG | HEIGHT: 64 IN

## 2021-10-04 PROCEDURE — 700111 HCHG RX REV CODE 636 W/ 250 OVERRIDE (IP): Performed by: STUDENT IN AN ORGANIZED HEALTH CARE EDUCATION/TRAINING PROGRAM

## 2021-10-04 PROCEDURE — A9270 NON-COVERED ITEM OR SERVICE: HCPCS | Performed by: SURGERY

## 2021-10-04 PROCEDURE — A9270 NON-COVERED ITEM OR SERVICE: HCPCS | Performed by: NURSE PRACTITIONER

## 2021-10-04 PROCEDURE — 700102 HCHG RX REV CODE 250 W/ 637 OVERRIDE(OP): Performed by: NURSE PRACTITIONER

## 2021-10-04 PROCEDURE — 99238 HOSP IP/OBS DSCHRG MGMT 30/<: CPT | Mod: 24 | Performed by: SURGERY

## 2021-10-04 PROCEDURE — 700102 HCHG RX REV CODE 250 W/ 637 OVERRIDE(OP): Performed by: SURGERY

## 2021-10-04 PROCEDURE — 71045 X-RAY EXAM CHEST 1 VIEW: CPT

## 2021-10-04 RX ADMIN — OXYCODONE 5 MG: 5 TABLET ORAL at 07:22

## 2021-10-04 RX ADMIN — QUETIAPINE FUMARATE 50 MG: 100 TABLET ORAL at 04:49

## 2021-10-04 RX ADMIN — ENOXAPARIN SODIUM 30 MG: 30 INJECTION SUBCUTANEOUS at 04:49

## 2021-10-04 RX ADMIN — POLYETHYLENE GLYCOL 3350 1 PACKET: 17 POWDER, FOR SOLUTION ORAL at 04:49

## 2021-10-04 RX ADMIN — GUAIFENESIN 600 MG: 600 TABLET, EXTENDED RELEASE ORAL at 04:49

## 2021-10-04 RX ADMIN — DOCUSATE SODIUM 100 MG: 100 CAPSULE, LIQUID FILLED ORAL at 04:49

## 2021-10-04 ASSESSMENT — ENCOUNTER SYMPTOMS
MYALGIAS: 0
BACK PAIN: 0
TINGLING: 0
DIAPHORESIS: 0
DIZZINESS: 0
NECK PAIN: 0
SPUTUM PRODUCTION: 0
DOUBLE VISION: 0
WEAKNESS: 0
ROS GI COMMENTS: LAST BM 10/2
HALLUCINATIONS: 0
FEVER: 0
VOMITING: 0
COUGH: 0
SENSORY CHANGE: 0
BLURRED VISION: 0
NAUSEA: 0
SHORTNESS OF BREATH: 0
HEADACHES: 0
CONSTIPATION: 0
CHILLS: 0
ABDOMINAL PAIN: 0

## 2021-10-04 ASSESSMENT — PAIN DESCRIPTION - PAIN TYPE
TYPE: ACUTE PAIN
TYPE: ACUTE PAIN

## 2021-10-04 NOTE — DISCHARGE PLANNING
Agency/Facility Name: [facility]   Spoke To: Gi  Outcome: Per Gi, card information was collected from sister.  Gi stated she mailed a concentrator to [facility] for the Pt to take and travel with.  DPA unable to reach [facility] in [place] at this time as the answering prompt stated they are closed     -[id number]  Agency/Facility Name: [facility]   Spoke To: Jayme   Outcome: Per Jayme, [facility] is unable to provide deliveries today.  Pt's sister will need to drive to [facility] to  the concentrator as well as 3 tanks, which Jayme stated were enough to get this Pt to [place].    RN / RN CM notified     -[phone]  Agency/Facility Name: [facility]  Spoke To: Gi  Outcome: Per Gi, concentr[facility] instead of the [place] office.  DPA provided Gi with number to [place] office as well as the person this DPA spoke with this morning to clarify the situation
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
## 2021-10-04 NOTE — DISCHARGE PLANNING
Notified by DPA [facility] is not able to deliver DME to bedside; requesting pt's family  at [facility] location. CM placed call to pt's sister Raquel ([phone]); VM left with request for call back.

## 2021-10-04 NOTE — CARE PLAN
The patient is Stable - Low risk of patient condition declining or worsening    Shift Goals  Clinical Goals: comfort, O2 needs  Patient Goals: comfort  Family Goals: ashlie    Progress made toward(s) clinical / shift goals:  comfort, rest     Patient is not progressing towards the following goals:

## 2021-10-04 NOTE — DISCHARGE PLANNING
Meds-to-Beds: Discharge prescription orders listed below delivered to patient's bedside and locked in patient drawer until discharge. TERESE Calvo notified. Patient counseled.     Current Outpatient Medications   Medication Sig Dispense Refill   • oxyCODONE immediate-release (ROXICODONE) 5 MG Tab Take 1 Tablet by mouth every 8 hours as needed for Severe Pain for up to 7 days. 21 Tablet 0   • QUEtiapine (SEROQUEL) 200 MG Tab Take 1 Tablet by mouth at bedtime for 7 days. 7 Tablet 0   • QUEtiapine (SEROQUEL) 50 MG tablet Take 1 Tablet by mouth every morning for 7 days. 7 Tablet 0      Elen Baez, PharmD

## 2021-10-04 NOTE — DISCHARGE INSTRUCTIONS
- Call or seek medical attention for questions or concerns  - Avoid all blood thinners including aspirin or NSAIDs (ibuprophen, Advil, Aleve, Motrin)   - Follow up with primary care provider as soon as possible in [place].  - Resume regular diet  - May take over the counter acetaminophen as needed for pain  - Continue daily over the counter stool softener while on narcotics  - No operation of machinery or motorized vehicles while under the influence of narcotics  - No alcohol, marijuana or illicit drug use while under the influence of narcotics  - In the event of a narcotic overdose naloxone (Narcan) is available without a prescription from any [facility]  - No swimming, hot tubs, baths or wound submersion until cleared by outpatient provider. May shower  - No contact sports, strenuous activities, or heavy lifting until cleared by outpatient provider  - If respiratory decompensation, persistent or worsening pain, or signs or symptoms of infection occur seek medical attention    Discharge Instructions    Discharged to home by car with relative. Discharged via walking, hospital escort: Refused.  Special equipment needed: C-Collar    Be sure to schedule a follow-up appointment with your primary care doctor or any specialists as instructed.     Discharge Plan:   Influenza Vaccine Indication: Patient Refuses    I understand that a diet low in cholesterol, fat, and sodium is recommended for good health. Unless I have been given specific instructions below for another diet, I accept this instruction as my diet prescription.   Other diet: regular    Special Instructions: None    · Is patient discharged on Warfarin / Coumadin?   No     Depression / Suicide Risk    As you are discharged from this [facility] facility, it is important to learn how to keep safe from harming yourself.    Recognize the warning signs:  · Abrupt changes in personality, positive or negative- including increase in energy   · Giving  away possessions  · Change in eating patterns- significant weight changes-  positive or negative  · Change in sleeping patterns- unable to sleep or sleeping all the time   · Unwillingness or inability to communicate  · Depression  · Unusual sadness, discouragement and loneliness  · Talk of wanting to die  · Neglect of personal appearance   · Rebelliousness- reckless behavior  · Withdrawal from people/activities they love  · Confusion- inability to concentrate     If you or a loved one observes any of these behaviors or has concerns about self-harm, here's what you can do:  · Talk about it- your feelings and reasons for harming yourself  · Remove any means that you might use to hurt yourself (examples: pills, rope, extension cords, firearm)  · Get professional help from the community (Mental Health, Substance Abuse, psychological counseling)  · Do not be alone:Call your Safe Contact- someone whom you trust who will be there for you.  · Call your local CRISIS HOTLINE 474-4297 or 427-496-5818  · Call your local Children's Mobile Crisis Response Team [place] (365) 617-2477 or www.[employer]  · Call the toll free National Suicide Prevention Hotlines   · National Suicide Prevention Lifeline 591-822-HHDV (5886)  · National Hope Line Network 800-SUICIDE (052-5806)
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
## 2022-01-02 NOTE — ASSESSMENT & PLAN NOTE
Addendum  created 01/02/22 1723 by Conor Gar CRNA    Flowsheet accepted C2 vertebral fracture of the lateral mass to the right and left of the midline extending through the base of the odontoid with 1 mm displacement of the odontoid with respect to the vertebral body.  Non-operative management.  Josh JOINER at all times with C-spine precautions for 6 weeks.  9/7 Neurosurgery opted to forego MRI.  Larry Max MD. Neurosurgeon. [facility]. (sign off 9/7).

## 2022-07-09 ENCOUNTER — HOSPITAL ENCOUNTER (EMERGENCY)
Dept: HOSPITAL 94 - ER | Age: 37
Discharge: HOME | End: 2022-07-09
Payer: COMMERCIAL

## 2022-07-09 VITALS — BODY MASS INDEX: 23.46 KG/M2 | WEIGHT: 124.25 LBS | HEIGHT: 61 IN

## 2022-07-09 VITALS — SYSTOLIC BLOOD PRESSURE: 106 MMHG | DIASTOLIC BLOOD PRESSURE: 65 MMHG

## 2022-07-09 DIAGNOSIS — F12.90: ICD-10-CM

## 2022-07-09 DIAGNOSIS — F32.9: ICD-10-CM

## 2022-07-09 DIAGNOSIS — Z79.899: ICD-10-CM

## 2022-07-09 DIAGNOSIS — Z56.0: ICD-10-CM

## 2022-07-09 DIAGNOSIS — R30.0: Primary | ICD-10-CM

## 2022-07-09 DIAGNOSIS — Z20.2: ICD-10-CM

## 2022-07-09 DIAGNOSIS — F41.9: ICD-10-CM

## 2022-07-09 DIAGNOSIS — Z88.0: ICD-10-CM

## 2022-07-09 DIAGNOSIS — F15.90: ICD-10-CM

## 2022-07-09 LAB
CLARITY UR: CLEAR
COLOR UR: YELLOW
GLUCOSE UR STRIP-MCNC: NEGATIVE MG/DL
HCG UR QL: NEGATIVE
HGB UR QL STRIP: NEGATIVE
KETONES UR STRIP-MCNC: 15 MG/DL
LEUKOCYTE ESTERASE UR QL STRIP: NEGATIVE
NITRITE UR QL STRIP: NEGATIVE
PH UR STRIP: 6 [PH] (ref 4.8–8)
PROT UR QL STRIP: NEGATIVE MG/DL
SP GR UR STRIP: >=1.03 (ref 1–1.03)
URN COLLECT METHOD CLASS: (no result)
UROBILINOGEN UR STRIP-MCNC: 1 E.U/DL (ref 0.2–1)

## 2022-07-09 PROCEDURE — 81025 URINE PREGNANCY TEST: CPT

## 2022-07-09 PROCEDURE — 36415 COLL VENOUS BLD VENIPUNCTURE: CPT

## 2022-07-09 PROCEDURE — 87491 CHLMYD TRACH DNA AMP PROBE: CPT

## 2022-07-09 PROCEDURE — 99283 EMERGENCY DEPT VISIT LOW MDM: CPT

## 2022-07-09 PROCEDURE — 81003 URINALYSIS AUTO W/O SCOPE: CPT

## 2022-07-09 SDOH — ECONOMIC STABILITY - INCOME SECURITY: UNEMPLOYMENT, UNSPECIFIED: Z56.0

## 2023-01-18 ENCOUNTER — HOSPITAL ENCOUNTER (EMERGENCY)
Dept: HOSPITAL 94 - ER | Age: 38
Discharge: HOME | End: 2023-01-18
Payer: MEDICAID

## 2023-01-18 VITALS — BODY MASS INDEX: 20.81 KG/M2 | HEIGHT: 61 IN | WEIGHT: 110.23 LBS

## 2023-01-18 VITALS — DIASTOLIC BLOOD PRESSURE: 66 MMHG | SYSTOLIC BLOOD PRESSURE: 108 MMHG
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
## 2023-01-18 DIAGNOSIS — F32.9: ICD-10-CM

## 2023-01-18 DIAGNOSIS — Z88.0: ICD-10-CM

## 2023-01-18 DIAGNOSIS — F41.9: ICD-10-CM
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
## 2023-01-18 DIAGNOSIS — Z79.899: ICD-10-CM

## 2023-01-18 DIAGNOSIS — Z76.0: ICD-10-CM

## 2023-01-18 DIAGNOSIS — T19.2XXA: Primary | ICD-10-CM

## 2023-01-18 DIAGNOSIS — F15.90: ICD-10-CM
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
## 2023-01-18 DIAGNOSIS — F12.90: ICD-10-CM
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
## 2023-01-18 LAB
BACTERIA URNS QL MICRO: (no result) /HPF
CLARITY UR: (no result)
COLOR UR: YELLOW
DEPRECATED SQUAMOUS URNS QL MICRO: (no result) /LPF
GLUCOSE UR STRIP-MCNC: NEGATIVE MG/DL
HCG UR QL: NEGATIVE
HGB UR QL STRIP: (no result)
KETONES UR STRIP-MCNC: NEGATIVE MG/DL
LEUKOCYTE ESTERASE UR QL STRIP: (no result)
NITRITE UR QL STRIP: POSITIVE
PH UR STRIP: 7 [PH] (ref 4.8–8)
PROT UR QL STRIP: NEGATIVE MG/DL
RBC #/AREA URNS HPF: (no result) /HPF (ref 0–2)
SP GR UR STRIP: 1.01 (ref 1–1.03)
URN COLLECT METHOD CLASS: (no result)
UROBILINOGEN UR STRIP-MCNC: 0.2 E.U/DL (ref 0.2–1)
WBC #/AREA URNS HPF: (no result) /HPF (ref 0–4)
WBC CLUMPS #/AREA URNS HPF: (no result) /HPF

## 2023-01-18 PROCEDURE — 81001 URINALYSIS AUTO W/SCOPE: CPT

## 2023-01-18 PROCEDURE — 81025 URINE PREGNANCY TEST: CPT

## 2023-01-18 PROCEDURE — 96372 THER/PROPH/DIAG INJ SC/IM: CPT

## 2023-01-18 PROCEDURE — 99284 EMERGENCY DEPT VISIT MOD MDM: CPT
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
## (undated) DEVICE — GLOVE BIOGEL PI ORTHO SZ 8 PF LF (40PR/BX)

## (undated) DEVICE — CONTAINER, SPECIMEN, STERILE

## (undated) DEVICE — DRESSING, WOUND VAC MED.

## (undated) DEVICE — SUTURE 1 PDS II PLUS TP-1 - (12PK/BX)

## (undated) DEVICE — ELECTRODE DUAL RETURN W/ CORD - (50/PK)

## (undated) DEVICE — FILTER BLOOD TRANSFUSION - (40/CA) (PALL)

## (undated) DEVICE — GLOVE BIOGEL INDICATOR SZ 6.5 SURGICAL PF LTX - (50PR/BX 4BX/CA)

## (undated) DEVICE — NEPTUNE 4 PORT MANIFOLD - (20/PK)

## (undated) DEVICE — DRAIN J-VAC 19FR. ROUND - (10/CS)

## (undated) DEVICE — MASK PANORAMIC OXYGEN PRO2 (30EA/CA)

## (undated) DEVICE — DRESSING SURGICAL NUKNIT 6X9 (10EA/BX)

## (undated) DEVICE — FORCEP GRASPING RESCUE COMBO RAT/ALLIGATOR (5EA/BX)

## (undated) DEVICE — BOVIE  BLADE 6 EXTENDED - (50/PK)

## (undated) DEVICE — DRAPE MEDI-SLUSH STERILE  - (40/CA)

## (undated) DEVICE — LIGASURE TISSUE FUSION  - SINGLE USE (6/CA)

## (undated) DEVICE — SET LEADWIRE 5 LEAD BEDSIDE DISPOSABLE ECG (1SET OF 5/EA)

## (undated) DEVICE — SENSOR SPO2 NEO LNCS ADHESIVE (20/BX) SEE USER NOTES

## (undated) DEVICE — TOWELS CLOTH SURGICAL - (4/PK 20PK/CA)

## (undated) DEVICE — SUTURE 3-0 VICRYL PLUS SH - 8X 18 INCH (12/BX)

## (undated) DEVICE — MANIFOLD NEPTUNE 1 PORT (20/PK)

## (undated) DEVICE — SUCTION INSTRUMENT YANKAUER BULBOUS TIP W/O VENT (50EA/CA)

## (undated) DEVICE — RESCUE RETRIEVAL NET

## (undated) DEVICE — SUTURE 3-0 SILK SH (12PK/BX)

## (undated) DEVICE — GLOVE BIOGEL SZ 8.5 SURGICAL PF LTX - (50PR/BX 4BX/CA)

## (undated) DEVICE — SUTURE 2-0 VICRYL PLUS CT-1 36 (36PK/BX)"

## (undated) DEVICE — SUTURE 2-0 ETHILON FS - (36/BX) 18 INCH

## (undated) DEVICE — LACTATED RINGERS INJ 1000 ML - (14EA/CA 60CA/PF)

## (undated) DEVICE — RETAINER 9 1/8INX5 7/8IN MED - (10/BX)

## (undated) DEVICE — GLOVE BIOGEL PI INDICATOR SZ 7.5 SURGICAL PF LF -(50/BX 4BX/CA)

## (undated) DEVICE — SUTURE 0 COATED VICRYL 6-18IN - (12PK/BX)

## (undated) DEVICE — GLOVE BIOGEL INDICATOR SZ 8.5 SURGICAL PF LTX - (50/BX 4BX/CA)

## (undated) DEVICE — GOWN SURGICAL X-LARGE ULTRA - FILM-REINFORCED (20/CA)

## (undated) DEVICE — TUBING O2 7FT TIP SMTH BORE - (50/CA)

## (undated) DEVICE — PACK MAJOR BASIN - (2EA/CA)

## (undated) DEVICE — SENSOR SPO2 ADULT LNCS ADTX (20/BX) ORDER ITEM #19593

## (undated) DEVICE — ELECTRODE 850 FOAM ADHESIVE - HYDROGEL RADIOTRNSPRNT (50/PK)

## (undated) DEVICE — RESERVOIR SUCTION 100 CC - SILICONE (20EA/CA)

## (undated) DEVICE — MASK ANESTHESIA ADULT  - (100/CA)

## (undated) DEVICE — CANISTER SUCTION 3000ML MECHANICAL FILTER AUTO SHUTOFF MEDI-VAC NONSTERILE LF DISP  (40EA/CA)

## (undated) DEVICE — TRAY CATHETER FOLEY URINE METER W/STATLOCK 350ML (10EA/CA)

## (undated) DEVICE — GLOVE BIOGEL PI INDICATOR SZ 8.0 SURGICAL PF LF -(50/BX 4BX/CA)

## (undated) DEVICE — TUBING CLEARLINK DUO-VENT - C-FLO (48EA/CA)

## (undated) DEVICE — FILM CASSETTE ENDO

## (undated) DEVICE — PAD LAP STERILE 18 X 18 - (5/PK 40PK/CA)

## (undated) DEVICE — SLEEVE, VASO, THIGH, MED

## (undated) DEVICE — SET EXTENSION WITH 2 PORTS (48EA/CA) ***PART #2C8610 IS A SUBSTITUTE*****

## (undated) DEVICE — GLOVE SZ 8 BIOGEL PI MICRO - PF LF (50PR/BX)

## (undated) DEVICE — KIT ANESTHESIA W/CIRCUIT & 3/LT BAG W/FILTER (20EA/CA)

## (undated) DEVICE — BAG SPONGE COUNT 10.25 X 32 - BLUE (250/CA)

## (undated) DEVICE — SUTURE 1 VICRYL PLUS CTX - 8 X 18 INCH (12/BX)

## (undated) DEVICE — KIT CUSTOM PROCEDURE SINGLE FOR ENDO  (15/CA)

## (undated) DEVICE — STAPLER 75MM LINEAR OPEN (3EA/BX)

## (undated) DEVICE — TRAY SRGPRP PVP IOD WT PRP - (20/CA)

## (undated) DEVICE — CANISTER SUCTION RIGID RED 1500CC (40EA/CA)

## (undated) DEVICE — Device

## (undated) DEVICE — HEAD HOLDER JUNIOR/ADULT

## (undated) DEVICE — SUTURE 2-0 COATED VICRYL PLUS - 12 X 18 INCH (12/BX)

## (undated) DEVICE — TUBE CONNECTING SUCTION - CLEAR PLASTIC STERILE 72 IN (50EA/CA)

## (undated) DEVICE — HEMOSTAT SURG ABSORBABLE - 4 X 8 IN SURGICEL (24EA/CA)

## (undated) DEVICE — TOWEL STOP TIMEOUT SAFETY FLAG (40EA/CA)

## (undated) DEVICE — GOWN WARMING STANDARD FLEX - (30/CA)

## (undated) DEVICE — GLOVE BIOGEL SZ 7.5 SURGICAL PF LTX - (50PR/BX 4BX/CA)

## (undated) DEVICE — SUTURE 1 PROLENE CTX (36PK/BX)

## (undated) DEVICE — SUTURE GENERAL

## (undated) DEVICE — DRAPE MAYO STAND - (30/CA)

## (undated) DEVICE — SODIUM CHL IRRIGATION 0.9% 1000ML (12EA/CA)

## (undated) DEVICE — TRAY MULTI-LUMEN 7FR PRESSURE W/MAX BARRIER AND BIOPATCH - (5/CA)

## (undated) DEVICE — TUBE CONNECT SUCTION CLEAR 120 X 1/4" (50EA/CA)"

## (undated) DEVICE — SUTURE 3-0 ETHILON PS-1 (36PK/BX)

## (undated) DEVICE — SPONGE DRAIN 4 X 4IN 6-PLY - (2/PK25PK/BX12BX/CS)

## (undated) DEVICE — STAPLER SKIN DISP - (6/BX 10BX/CA) VISISTAT

## (undated) DEVICE — GLOVE BIOGEL SZ 6 PF LATEX - (50EA/BX 4BX/CA)

## (undated) DEVICE — PROTECTOR ULNA NERVE - (36PR/CA)

## (undated) DEVICE — SYSTEM PREVEAN CUSTOMIZABLE 90CM/150ML CANISTER INCIS

## (undated) DEVICE — STAPLE 75MM LINEAR (12EA/BX)

## (undated) DEVICE — SPONGE GAUZESTER 4 X 4 4PLY - (128PK/CA)

## (undated) DEVICE — CHLORAPREP 26 ML APPLICATOR - ORANGE TINT(25/CA)

## (undated) DEVICE — WATER IRRIGATION STERILE 1000ML (12EA/CA)

## (undated) DEVICE — CANNULA O2 COMFORT SOFT EAR ADULT 7 FT TUBING (50/CA)